# Patient Record
Sex: MALE | Race: WHITE | NOT HISPANIC OR LATINO | Employment: OTHER | URBAN - METROPOLITAN AREA
[De-identification: names, ages, dates, MRNs, and addresses within clinical notes are randomized per-mention and may not be internally consistent; named-entity substitution may affect disease eponyms.]

---

## 2022-05-13 ENCOUNTER — HOSPITAL ENCOUNTER (INPATIENT)
Facility: CLINIC | Age: 62
LOS: 1 days | Discharge: HOME | DRG: 481 | End: 2022-05-15
Attending: HOSPITALIST | Admitting: HOSPITALIST
Payer: COMMERCIAL

## 2022-05-13 DIAGNOSIS — M96.662: Primary | ICD-10-CM

## 2022-05-13 LAB
ABO/RH(D): NORMAL
ANION GAP SERPL CALCULATED.3IONS-SCNC: 10 MMOL/L (ref 5–18)
ANTIBODY SCREEN: NEGATIVE
BASOPHILS # BLD AUTO: 0.1 10E3/UL (ref 0–0.2)
BASOPHILS NFR BLD AUTO: 1 %
BUN SERPL-MCNC: 18 MG/DL (ref 8–22)
CALCIUM SERPL-MCNC: 9.2 MG/DL (ref 8.5–10.5)
CHLORIDE BLD-SCNC: 107 MMOL/L (ref 98–107)
CO2 SERPL-SCNC: 24 MMOL/L (ref 22–31)
CREAT SERPL-MCNC: 0.7 MG/DL (ref 0.7–1.3)
EOSINOPHIL # BLD AUTO: 0.5 10E3/UL (ref 0–0.7)
EOSINOPHIL NFR BLD AUTO: 5 %
ERYTHROCYTE [DISTWIDTH] IN BLOOD BY AUTOMATED COUNT: 14.6 % (ref 10–15)
GFR SERPL CREATININE-BSD FRML MDRD: >90 ML/MIN/1.73M2
GLUCOSE BLD-MCNC: 102 MG/DL (ref 70–125)
HCT VFR BLD AUTO: 49 % (ref 40–53)
HGB BLD-MCNC: 15.8 G/DL (ref 13.3–17.7)
HOLD SPECIMEN: NORMAL
IMM GRANULOCYTES # BLD: 0 10E3/UL
IMM GRANULOCYTES NFR BLD: 0 %
INR PPP: 1.2 (ref 0.85–1.15)
LYMPHOCYTES # BLD AUTO: 1.4 10E3/UL (ref 0.8–5.3)
LYMPHOCYTES NFR BLD AUTO: 14 %
MCH RBC QN AUTO: 27.9 PG (ref 26.5–33)
MCHC RBC AUTO-ENTMCNC: 32.2 G/DL (ref 31.5–36.5)
MCV RBC AUTO: 86 FL (ref 78–100)
MONOCYTES # BLD AUTO: 0.7 10E3/UL (ref 0–1.3)
MONOCYTES NFR BLD AUTO: 7 %
NEUTROPHILS # BLD AUTO: 7.2 10E3/UL (ref 1.6–8.3)
NEUTROPHILS NFR BLD AUTO: 73 %
NRBC # BLD AUTO: 0 10E3/UL
NRBC BLD AUTO-RTO: 0 /100
PLATELET # BLD AUTO: 389 10E3/UL (ref 150–450)
POTASSIUM BLD-SCNC: 4.1 MMOL/L (ref 3.5–5)
RBC # BLD AUTO: 5.67 10E6/UL (ref 4.4–5.9)
SODIUM SERPL-SCNC: 141 MMOL/L (ref 136–145)
SPECIMEN EXPIRATION DATE: NORMAL
WBC # BLD AUTO: 10 10E3/UL (ref 4–11)

## 2022-05-13 PROCEDURE — 85025 COMPLETE CBC W/AUTO DIFF WBC: CPT | Performed by: HOSPITALIST

## 2022-05-13 PROCEDURE — 86850 RBC ANTIBODY SCREEN: CPT | Performed by: HOSPITALIST

## 2022-05-13 PROCEDURE — 86901 BLOOD TYPING SEROLOGIC RH(D): CPT | Performed by: HOSPITALIST

## 2022-05-13 PROCEDURE — 86923 COMPATIBILITY TEST ELECTRIC: CPT | Performed by: ANESTHESIOLOGY

## 2022-05-13 PROCEDURE — 99222 1ST HOSP IP/OBS MODERATE 55: CPT | Performed by: HOSPITALIST

## 2022-05-13 PROCEDURE — 80048 BASIC METABOLIC PNL TOTAL CA: CPT | Performed by: HOSPITALIST

## 2022-05-13 PROCEDURE — 250N000013 HC RX MED GY IP 250 OP 250 PS 637: Performed by: HOSPITALIST

## 2022-05-13 PROCEDURE — 36415 COLL VENOUS BLD VENIPUNCTURE: CPT | Performed by: HOSPITALIST

## 2022-05-13 PROCEDURE — 85610 PROTHROMBIN TIME: CPT | Performed by: HOSPITALIST

## 2022-05-13 RX ORDER — PROCHLORPERAZINE MALEATE 10 MG
10 TABLET ORAL EVERY 6 HOURS PRN
Status: DISCONTINUED | OUTPATIENT
Start: 2022-05-13 | End: 2022-05-15 | Stop reason: HOSPADM

## 2022-05-13 RX ORDER — ONDANSETRON 4 MG/1
4 TABLET, ORALLY DISINTEGRATING ORAL EVERY 6 HOURS PRN
Status: DISCONTINUED | OUTPATIENT
Start: 2022-05-13 | End: 2022-05-15 | Stop reason: HOSPADM

## 2022-05-13 RX ORDER — DOCUSATE SODIUM 100 MG/1
100 CAPSULE, LIQUID FILLED ORAL 2 TIMES DAILY
Status: DISCONTINUED | OUTPATIENT
Start: 2022-05-13 | End: 2022-05-15 | Stop reason: HOSPADM

## 2022-05-13 RX ORDER — HYDROMORPHONE HCL IN WATER/PF 6 MG/30 ML
0.2 PATIENT CONTROLLED ANALGESIA SYRINGE INTRAVENOUS
Status: DISCONTINUED | OUTPATIENT
Start: 2022-05-13 | End: 2022-05-15 | Stop reason: HOSPADM

## 2022-05-13 RX ORDER — ACETAMINOPHEN 325 MG/1
650 TABLET ORAL EVERY 4 HOURS PRN
Status: DISCONTINUED | OUTPATIENT
Start: 2022-05-16 | End: 2022-05-15 | Stop reason: HOSPADM

## 2022-05-13 RX ORDER — CLONAZEPAM 0.5 MG/1
1 TABLET ORAL 2 TIMES DAILY
Status: DISCONTINUED | OUTPATIENT
Start: 2022-05-13 | End: 2022-05-15 | Stop reason: HOSPADM

## 2022-05-13 RX ORDER — CLONAZEPAM 1 MG/1
1 TABLET ORAL 2 TIMES DAILY
COMMUNITY
Start: 2022-04-26

## 2022-05-13 RX ORDER — NALOXONE HYDROCHLORIDE 0.4 MG/ML
0.4 INJECTION, SOLUTION INTRAMUSCULAR; INTRAVENOUS; SUBCUTANEOUS
Status: DISCONTINUED | OUTPATIENT
Start: 2022-05-13 | End: 2022-05-15 | Stop reason: HOSPADM

## 2022-05-13 RX ORDER — HYDROMORPHONE HYDROCHLORIDE 4 MG/1
4 TABLET ORAL EVERY 4 HOURS PRN
Status: DISCONTINUED | OUTPATIENT
Start: 2022-05-13 | End: 2022-05-15 | Stop reason: HOSPADM

## 2022-05-13 RX ORDER — PROCHLORPERAZINE 25 MG
25 SUPPOSITORY, RECTAL RECTAL EVERY 12 HOURS PRN
Status: DISCONTINUED | OUTPATIENT
Start: 2022-05-13 | End: 2022-05-15 | Stop reason: HOSPADM

## 2022-05-13 RX ORDER — CHOLECALCIFEROL (VITAMIN D3) 50 MCG
1 TABLET ORAL DAILY
COMMUNITY

## 2022-05-13 RX ORDER — NALOXONE HYDROCHLORIDE 0.4 MG/ML
0.2 INJECTION, SOLUTION INTRAMUSCULAR; INTRAVENOUS; SUBCUTANEOUS
Status: DISCONTINUED | OUTPATIENT
Start: 2022-05-13 | End: 2022-05-15 | Stop reason: HOSPADM

## 2022-05-13 RX ORDER — ACETAMINOPHEN 325 MG/1
975 TABLET ORAL EVERY 8 HOURS
Status: DISCONTINUED | OUTPATIENT
Start: 2022-05-13 | End: 2022-05-15 | Stop reason: HOSPADM

## 2022-05-13 RX ORDER — HYDROMORPHONE HCL IN WATER/PF 6 MG/30 ML
0.4 PATIENT CONTROLLED ANALGESIA SYRINGE INTRAVENOUS
Status: DISCONTINUED | OUTPATIENT
Start: 2022-05-13 | End: 2022-05-15 | Stop reason: HOSPADM

## 2022-05-13 RX ORDER — HYDROMORPHONE HYDROCHLORIDE 2 MG/1
2 TABLET ORAL EVERY 4 HOURS PRN
Status: DISCONTINUED | OUTPATIENT
Start: 2022-05-13 | End: 2022-05-15 | Stop reason: HOSPADM

## 2022-05-13 RX ORDER — AMOXICILLIN 500 MG/1
2000 TABLET, FILM COATED ORAL SEE ADMIN INSTRUCTIONS
COMMUNITY
Start: 2022-03-08

## 2022-05-13 RX ORDER — POLYETHYLENE GLYCOL 3350 17 G/17G
17 POWDER, FOR SOLUTION ORAL DAILY
Status: DISCONTINUED | OUTPATIENT
Start: 2022-05-13 | End: 2022-05-15 | Stop reason: HOSPADM

## 2022-05-13 RX ORDER — TAMSULOSIN HYDROCHLORIDE 0.4 MG/1
0.4 CAPSULE ORAL DAILY
COMMUNITY
Start: 2022-04-12

## 2022-05-13 RX ORDER — ONDANSETRON 2 MG/ML
4 INJECTION INTRAMUSCULAR; INTRAVENOUS EVERY 6 HOURS PRN
Status: DISCONTINUED | OUTPATIENT
Start: 2022-05-13 | End: 2022-05-15 | Stop reason: HOSPADM

## 2022-05-13 RX ORDER — TAMSULOSIN HYDROCHLORIDE 0.4 MG/1
0.4 CAPSULE ORAL DAILY
Status: DISCONTINUED | OUTPATIENT
Start: 2022-05-14 | End: 2022-05-15 | Stop reason: HOSPADM

## 2022-05-13 RX ADMIN — CLONAZEPAM 1 MG: 0.5 TABLET ORAL at 21:46

## 2022-05-13 RX ADMIN — ACETAMINOPHEN 975 MG: 325 TABLET ORAL at 18:28

## 2022-05-13 RX ADMIN — DOCUSATE SODIUM 100 MG: 100 CAPSULE, LIQUID FILLED ORAL at 21:46

## 2022-05-13 ASSESSMENT — COLUMBIA-SUICIDE SEVERITY RATING SCALE - C-SSRS
1. IN THE PAST MONTH, HAVE YOU WISHED YOU WERE DEAD OR WISHED YOU COULD GO TO SLEEP AND NOT WAKE UP?: NO
5. HAVE YOU STARTED TO WORK OUT OR WORKED OUT THE DETAILS OF HOW TO KILL YOURSELF? DO YOU INTEND TO CARRY OUT THIS PLAN?: NO
2. HAVE YOU ACTUALLY HAD ANY THOUGHTS OF KILLING YOURSELF IN THE PAST MONTH?: NO
4. HAVE YOU HAD THESE THOUGHTS AND HAD SOME INTENTION OF ACTING ON THEM?: NO
3. HAVE YOU BEEN THINKING ABOUT HOW YOU MIGHT KILL YOURSELF?: NO
6. HAVE YOU EVER DONE ANYTHING, STARTED TO DO ANYTHING, OR PREPARED TO DO ANYTHING TO END YOUR LIFE?: NO
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
## 2022-05-14 ENCOUNTER — ANESTHESIA (OUTPATIENT)
Dept: SURGERY | Facility: CLINIC | Age: 62
DRG: 481 | End: 2022-05-14
Payer: COMMERCIAL

## 2022-05-14 ENCOUNTER — ANESTHESIA EVENT (OUTPATIENT)
Dept: SURGERY | Facility: CLINIC | Age: 62
DRG: 481 | End: 2022-05-14
Payer: COMMERCIAL

## 2022-05-14 ENCOUNTER — APPOINTMENT (OUTPATIENT)
Dept: RADIOLOGY | Facility: CLINIC | Age: 62
DRG: 481 | End: 2022-05-14
Attending: STUDENT IN AN ORGANIZED HEALTH CARE EDUCATION/TRAINING PROGRAM
Payer: COMMERCIAL

## 2022-05-14 PROBLEM — M96.662: Status: ACTIVE | Noted: 2022-05-14

## 2022-05-14 LAB
BLD PROD TYP BPU: NORMAL
BLOOD COMPONENT TYPE: NORMAL
CODING SYSTEM: NORMAL
CROSSMATCH: NORMAL
SARS-COV-2 RNA RESP QL NAA+PROBE: NEGATIVE
UNIT ABO/RH: NORMAL
UNIT NUMBER: NORMAL
UNIT STATUS: NORMAL
UNIT TYPE ISBT: 6200

## 2022-05-14 PROCEDURE — 258N000001 HC RX 258: Performed by: STUDENT IN AN ORGANIZED HEALTH CARE EDUCATION/TRAINING PROGRAM

## 2022-05-14 PROCEDURE — 0QS904Z REPOSITION LEFT FEMORAL SHAFT WITH INTERNAL FIXATION DEVICE, OPEN APPROACH: ICD-10-PCS | Performed by: STUDENT IN AN ORGANIZED HEALTH CARE EDUCATION/TRAINING PROGRAM

## 2022-05-14 PROCEDURE — 272N000001 HC OR GENERAL SUPPLY STERILE: Performed by: STUDENT IN AN ORGANIZED HEALTH CARE EDUCATION/TRAINING PROGRAM

## 2022-05-14 PROCEDURE — 250N000013 HC RX MED GY IP 250 OP 250 PS 637: Performed by: PHYSICIAN ASSISTANT

## 2022-05-14 PROCEDURE — 120N000001 HC R&B MED SURG/OB

## 2022-05-14 PROCEDURE — 250N000011 HC RX IP 250 OP 636: Performed by: ORTHOPAEDIC SURGERY

## 2022-05-14 PROCEDURE — 370N000017 HC ANESTHESIA TECHNICAL FEE, PER MIN: Performed by: STUDENT IN AN ORGANIZED HEALTH CARE EDUCATION/TRAINING PROGRAM

## 2022-05-14 PROCEDURE — 250N000011 HC RX IP 250 OP 636: Performed by: ANESTHESIOLOGY

## 2022-05-14 PROCEDURE — 258N000003 HC RX IP 258 OP 636: Performed by: ANESTHESIOLOGY

## 2022-05-14 PROCEDURE — 360N000077 HC SURGERY LEVEL 4, PER MIN: Performed by: STUDENT IN AN ORGANIZED HEALTH CARE EDUCATION/TRAINING PROGRAM

## 2022-05-14 PROCEDURE — C1769 GUIDE WIRE: HCPCS | Performed by: STUDENT IN AN ORGANIZED HEALTH CARE EDUCATION/TRAINING PROGRAM

## 2022-05-14 PROCEDURE — P9041 ALBUMIN (HUMAN),5%, 50ML: HCPCS | Performed by: ANESTHESIOLOGY

## 2022-05-14 PROCEDURE — 999N000181 XR SURGERY CARM FLUORO GREATER THAN 5 MIN W STILLS: Mod: TC

## 2022-05-14 PROCEDURE — 710N000010 HC RECOVERY PHASE 1, LEVEL 2, PER MIN: Performed by: STUDENT IN AN ORGANIZED HEALTH CARE EDUCATION/TRAINING PROGRAM

## 2022-05-14 PROCEDURE — 250N000011 HC RX IP 250 OP 636: Performed by: PHYSICIAN ASSISTANT

## 2022-05-14 PROCEDURE — 73552 X-RAY EXAM OF FEMUR 2/>: CPT | Mod: LT

## 2022-05-14 PROCEDURE — 250N000011 HC RX IP 250 OP 636: Performed by: STUDENT IN AN ORGANIZED HEALTH CARE EDUCATION/TRAINING PROGRAM

## 2022-05-14 PROCEDURE — U0005 INFEC AGEN DETEC AMPLI PROBE: HCPCS | Performed by: FAMILY MEDICINE

## 2022-05-14 PROCEDURE — C1713 ANCHOR/SCREW BN/BN,TIS/BN: HCPCS | Performed by: STUDENT IN AN ORGANIZED HEALTH CARE EDUCATION/TRAINING PROGRAM

## 2022-05-14 PROCEDURE — 250N000009 HC RX 250: Performed by: STUDENT IN AN ORGANIZED HEALTH CARE EDUCATION/TRAINING PROGRAM

## 2022-05-14 PROCEDURE — 250N000011 HC RX IP 250 OP 636: Performed by: NURSE ANESTHETIST, CERTIFIED REGISTERED

## 2022-05-14 PROCEDURE — 258N000003 HC RX IP 258 OP 636: Performed by: NURSE ANESTHETIST, CERTIFIED REGISTERED

## 2022-05-14 PROCEDURE — 250N000009 HC RX 250: Performed by: NURSE ANESTHETIST, CERTIFIED REGISTERED

## 2022-05-14 PROCEDURE — 99233 SBSQ HOSP IP/OBS HIGH 50: CPT | Performed by: FAMILY MEDICINE

## 2022-05-14 PROCEDURE — 250N000013 HC RX MED GY IP 250 OP 250 PS 637: Performed by: HOSPITALIST

## 2022-05-14 DEVICE — IMPLANTABLE DEVICE: Type: IMPLANTABLE DEVICE | Site: LEG | Status: FUNCTIONAL

## 2022-05-14 DEVICE — IMP SCR SYN CORTEX 4.5X38MM SELF TAP SS 214.838: Type: IMPLANTABLE DEVICE | Site: LEG | Status: FUNCTIONAL

## 2022-05-14 DEVICE — IMP CABLE W/CRIMP SYN 1.7X750MM 298.801.01S: Type: IMPLANTABLE DEVICE | Site: LEG | Status: FUNCTIONAL

## 2022-05-14 DEVICE — IMP SCR SYN CORTEX 4.5X40MM SELF TAP SS 214.840: Type: IMPLANTABLE DEVICE | Site: LEG | Status: FUNCTIONAL

## 2022-05-14 DEVICE — IMP SCR SYN 5.0X44MM VA LOCK ST T25 STARDRIVE 02.231.244: Type: IMPLANTABLE DEVICE | Site: LEG | Status: FUNCTIONAL

## 2022-05-14 RX ORDER — FENTANYL CITRATE 50 UG/ML
25 INJECTION, SOLUTION INTRAMUSCULAR; INTRAVENOUS EVERY 5 MIN PRN
Status: DISCONTINUED | OUTPATIENT
Start: 2022-05-14 | End: 2022-05-14 | Stop reason: HOSPADM

## 2022-05-14 RX ORDER — CEFAZOLIN SODIUM 2 G/100ML
2 INJECTION, SOLUTION INTRAVENOUS SEE ADMIN INSTRUCTIONS
Status: DISCONTINUED | OUTPATIENT
Start: 2022-05-14 | End: 2022-05-14 | Stop reason: HOSPADM

## 2022-05-14 RX ORDER — PROCHLORPERAZINE MALEATE 10 MG
10 TABLET ORAL EVERY 6 HOURS PRN
Status: DISCONTINUED | OUTPATIENT
Start: 2022-05-14 | End: 2022-05-15 | Stop reason: HOSPADM

## 2022-05-14 RX ORDER — HYDROMORPHONE HCL IN WATER/PF 6 MG/30 ML
0.2 PATIENT CONTROLLED ANALGESIA SYRINGE INTRAVENOUS EVERY 5 MIN PRN
Status: DISCONTINUED | OUTPATIENT
Start: 2022-05-14 | End: 2022-05-14 | Stop reason: HOSPADM

## 2022-05-14 RX ORDER — HYDROMORPHONE HYDROCHLORIDE 2 MG/1
2 TABLET ORAL EVERY 4 HOURS PRN
Status: DISCONTINUED | OUTPATIENT
Start: 2022-05-14 | End: 2022-05-15 | Stop reason: HOSPADM

## 2022-05-14 RX ORDER — HYDROMORPHONE HCL IN WATER/PF 6 MG/30 ML
0.2 PATIENT CONTROLLED ANALGESIA SYRINGE INTRAVENOUS
Status: DISCONTINUED | OUTPATIENT
Start: 2022-05-14 | End: 2022-05-15 | Stop reason: HOSPADM

## 2022-05-14 RX ORDER — ONDANSETRON 2 MG/ML
4 INJECTION INTRAMUSCULAR; INTRAVENOUS EVERY 6 HOURS PRN
Status: DISCONTINUED | OUTPATIENT
Start: 2022-05-14 | End: 2022-05-15 | Stop reason: HOSPADM

## 2022-05-14 RX ORDER — CEFAZOLIN SODIUM 1 G/3ML
1 INJECTION, POWDER, FOR SOLUTION INTRAMUSCULAR; INTRAVENOUS EVERY 8 HOURS
Status: DISCONTINUED | OUTPATIENT
Start: 2022-05-14 | End: 2022-05-15 | Stop reason: HOSPADM

## 2022-05-14 RX ORDER — ACETAMINOPHEN 325 MG/1
650 TABLET ORAL EVERY 4 HOURS PRN
Status: DISCONTINUED | OUTPATIENT
Start: 2022-05-17 | End: 2022-05-14

## 2022-05-14 RX ORDER — ONDANSETRON 2 MG/ML
INJECTION INTRAMUSCULAR; INTRAVENOUS PRN
Status: DISCONTINUED | OUTPATIENT
Start: 2022-05-14 | End: 2022-05-14

## 2022-05-14 RX ORDER — TRANEXAMIC ACID 100 MG/ML
INJECTION, SOLUTION INTRAVENOUS
Status: DISCONTINUED
Start: 2022-05-14 | End: 2022-05-14 | Stop reason: HOSPADM

## 2022-05-14 RX ORDER — HYDROMORPHONE HCL IN WATER/PF 6 MG/30 ML
0.4 PATIENT CONTROLLED ANALGESIA SYRINGE INTRAVENOUS
Status: DISCONTINUED | OUTPATIENT
Start: 2022-05-14 | End: 2022-05-15 | Stop reason: HOSPADM

## 2022-05-14 RX ORDER — LIDOCAINE HYDROCHLORIDE 20 MG/ML
INJECTION, SOLUTION INFILTRATION; PERINEURAL PRN
Status: DISCONTINUED | OUTPATIENT
Start: 2022-05-14 | End: 2022-05-14

## 2022-05-14 RX ORDER — SODIUM CHLORIDE, SODIUM LACTATE, POTASSIUM CHLORIDE, CALCIUM CHLORIDE 600; 310; 30; 20 MG/100ML; MG/100ML; MG/100ML; MG/100ML
INJECTION, SOLUTION INTRAVENOUS CONTINUOUS
Status: DISCONTINUED | OUTPATIENT
Start: 2022-05-14 | End: 2022-05-15

## 2022-05-14 RX ORDER — VANCOMYCIN HYDROCHLORIDE 1 G/20ML
INJECTION, POWDER, LYOPHILIZED, FOR SOLUTION INTRAVENOUS PRN
Status: DISCONTINUED | OUTPATIENT
Start: 2022-05-14 | End: 2022-05-14 | Stop reason: HOSPADM

## 2022-05-14 RX ORDER — DEXAMETHASONE SODIUM PHOSPHATE 10 MG/ML
INJECTION, SOLUTION INTRAMUSCULAR; INTRAVENOUS PRN
Status: DISCONTINUED | OUTPATIENT
Start: 2022-05-14 | End: 2022-05-14

## 2022-05-14 RX ORDER — ONDANSETRON 4 MG/1
4 TABLET, ORALLY DISINTEGRATING ORAL EVERY 6 HOURS PRN
Status: DISCONTINUED | OUTPATIENT
Start: 2022-05-14 | End: 2022-05-15 | Stop reason: HOSPADM

## 2022-05-14 RX ORDER — BUPIVACAINE HYDROCHLORIDE 2.5 MG/ML
INJECTION, SOLUTION INFILTRATION; PERINEURAL PRN
Status: DISCONTINUED | OUTPATIENT
Start: 2022-05-14 | End: 2022-05-14 | Stop reason: HOSPADM

## 2022-05-14 RX ORDER — HYDROMORPHONE HYDROCHLORIDE 4 MG/1
4 TABLET ORAL EVERY 4 HOURS PRN
Status: DISCONTINUED | OUTPATIENT
Start: 2022-05-14 | End: 2022-05-15 | Stop reason: HOSPADM

## 2022-05-14 RX ORDER — ONDANSETRON 2 MG/ML
4 INJECTION INTRAMUSCULAR; INTRAVENOUS EVERY 30 MIN PRN
Status: DISCONTINUED | OUTPATIENT
Start: 2022-05-14 | End: 2022-05-14 | Stop reason: HOSPADM

## 2022-05-14 RX ORDER — CEFAZOLIN SODIUM 2 G/100ML
2 INJECTION, SOLUTION INTRAVENOUS
Status: COMPLETED | OUTPATIENT
Start: 2022-05-14 | End: 2022-05-14

## 2022-05-14 RX ORDER — LIDOCAINE 40 MG/G
CREAM TOPICAL
Status: DISCONTINUED | OUTPATIENT
Start: 2022-05-14 | End: 2022-05-15 | Stop reason: HOSPADM

## 2022-05-14 RX ORDER — ONDANSETRON 4 MG/1
4 TABLET, ORALLY DISINTEGRATING ORAL EVERY 30 MIN PRN
Status: DISCONTINUED | OUTPATIENT
Start: 2022-05-14 | End: 2022-05-14 | Stop reason: HOSPADM

## 2022-05-14 RX ORDER — MAGNESIUM HYDROXIDE 1200 MG/15ML
LIQUID ORAL PRN
Status: DISCONTINUED | OUTPATIENT
Start: 2022-05-14 | End: 2022-05-14 | Stop reason: HOSPADM

## 2022-05-14 RX ORDER — SODIUM CHLORIDE, SODIUM LACTATE, POTASSIUM CHLORIDE, CALCIUM CHLORIDE 600; 310; 30; 20 MG/100ML; MG/100ML; MG/100ML; MG/100ML
INJECTION, SOLUTION INTRAVENOUS CONTINUOUS
Status: DISCONTINUED | OUTPATIENT
Start: 2022-05-14 | End: 2022-05-14 | Stop reason: HOSPADM

## 2022-05-14 RX ORDER — BISACODYL 10 MG
10 SUPPOSITORY, RECTAL RECTAL DAILY PRN
Status: DISCONTINUED | OUTPATIENT
Start: 2022-05-14 | End: 2022-05-15 | Stop reason: HOSPADM

## 2022-05-14 RX ORDER — POLYETHYLENE GLYCOL 3350 17 G/17G
17 POWDER, FOR SOLUTION ORAL DAILY
Status: DISCONTINUED | OUTPATIENT
Start: 2022-05-15 | End: 2022-05-15 | Stop reason: HOSPADM

## 2022-05-14 RX ORDER — ALBUMIN, HUMAN INJ 5% 5 %
SOLUTION INTRAVENOUS CONTINUOUS PRN
Status: DISCONTINUED | OUTPATIENT
Start: 2022-05-14 | End: 2022-05-14

## 2022-05-14 RX ORDER — BUPIVACAINE HYDROCHLORIDE 7.5 MG/ML
INJECTION, SOLUTION INTRASPINAL
Status: COMPLETED | OUTPATIENT
Start: 2022-05-14 | End: 2022-05-14

## 2022-05-14 RX ORDER — TRANEXAMIC ACID 650 MG/1
1950 TABLET ORAL ONCE
Status: DISCONTINUED | OUTPATIENT
Start: 2022-05-14 | End: 2022-05-14

## 2022-05-14 RX ORDER — ACETAMINOPHEN 325 MG/1
975 TABLET ORAL EVERY 8 HOURS
Status: DISCONTINUED | OUTPATIENT
Start: 2022-05-14 | End: 2022-05-14

## 2022-05-14 RX ORDER — FENTANYL CITRATE 50 UG/ML
INJECTION, SOLUTION INTRAMUSCULAR; INTRAVENOUS PRN
Status: DISCONTINUED | OUTPATIENT
Start: 2022-05-14 | End: 2022-05-14

## 2022-05-14 RX ORDER — PROPOFOL 10 MG/ML
INJECTION, EMULSION INTRAVENOUS CONTINUOUS PRN
Status: DISCONTINUED | OUTPATIENT
Start: 2022-05-14 | End: 2022-05-14

## 2022-05-14 RX ORDER — AMOXICILLIN 250 MG
1 CAPSULE ORAL 2 TIMES DAILY
Status: DISCONTINUED | OUTPATIENT
Start: 2022-05-14 | End: 2022-05-15 | Stop reason: HOSPADM

## 2022-05-14 RX ORDER — SODIUM CHLORIDE, SODIUM LACTATE, POTASSIUM CHLORIDE, CALCIUM CHLORIDE 600; 310; 30; 20 MG/100ML; MG/100ML; MG/100ML; MG/100ML
INJECTION, SOLUTION INTRAVENOUS CONTINUOUS PRN
Status: DISCONTINUED | OUTPATIENT
Start: 2022-05-14 | End: 2022-05-14

## 2022-05-14 RX ORDER — PROPOFOL 10 MG/ML
INJECTION, EMULSION INTRAVENOUS PRN
Status: DISCONTINUED | OUTPATIENT
Start: 2022-05-14 | End: 2022-05-14

## 2022-05-14 RX ADMIN — HYDROMORPHONE HYDROCHLORIDE 2 MG: 2 TABLET ORAL at 18:38

## 2022-05-14 RX ADMIN — SODIUM CHLORIDE, POTASSIUM CHLORIDE, SODIUM LACTATE AND CALCIUM CHLORIDE: 600; 310; 30; 20 INJECTION, SOLUTION INTRAVENOUS at 12:32

## 2022-05-14 RX ADMIN — HYDROMORPHONE HYDROCHLORIDE 2 MG: 2 TABLET ORAL at 21:10

## 2022-05-14 RX ADMIN — CLONAZEPAM 1 MG: 0.5 TABLET ORAL at 11:45

## 2022-05-14 RX ADMIN — DEXAMETHASONE SODIUM PHOSPHATE 10 MG: 10 INJECTION, SOLUTION INTRAMUSCULAR; INTRAVENOUS at 13:11

## 2022-05-14 RX ADMIN — ONDANSETRON 4 MG: 2 INJECTION INTRAMUSCULAR; INTRAVENOUS at 13:11

## 2022-05-14 RX ADMIN — SENNOSIDES AND DOCUSATE SODIUM 1 TABLET: 50; 8.6 TABLET ORAL at 21:10

## 2022-05-14 RX ADMIN — FENTANYL CITRATE 25 MCG: 50 INJECTION, SOLUTION INTRAMUSCULAR; INTRAVENOUS at 14:25

## 2022-05-14 RX ADMIN — HYDROMORPHONE HYDROCHLORIDE 0.2 MG: 0.2 INJECTION, SOLUTION INTRAMUSCULAR; INTRAVENOUS; SUBCUTANEOUS at 16:40

## 2022-05-14 RX ADMIN — HYDROMORPHONE HYDROCHLORIDE 0.2 MG: 0.2 INJECTION, SOLUTION INTRAMUSCULAR; INTRAVENOUS; SUBCUTANEOUS at 16:56

## 2022-05-14 RX ADMIN — HYDROMORPHONE HYDROCHLORIDE 0.2 MG: 0.2 INJECTION, SOLUTION INTRAMUSCULAR; INTRAVENOUS; SUBCUTANEOUS at 16:51

## 2022-05-14 RX ADMIN — CEFAZOLIN SODIUM 2 G: 2 INJECTION, SOLUTION INTRAVENOUS at 12:33

## 2022-05-14 RX ADMIN — HYDROMORPHONE HYDROCHLORIDE 0.5 MG: 1 INJECTION, SOLUTION INTRAMUSCULAR; INTRAVENOUS; SUBCUTANEOUS at 15:40

## 2022-05-14 RX ADMIN — FENTANYL CITRATE 25 MCG: 50 INJECTION, SOLUTION INTRAMUSCULAR; INTRAVENOUS at 16:29

## 2022-05-14 RX ADMIN — FENTANYL CITRATE 25 MCG: 50 INJECTION, SOLUTION INTRAMUSCULAR; INTRAVENOUS at 14:43

## 2022-05-14 RX ADMIN — PHENYLEPHRINE HYDROCHLORIDE 100 MCG: 10 INJECTION INTRAVENOUS at 13:45

## 2022-05-14 RX ADMIN — SODIUM CHLORIDE, POTASSIUM CHLORIDE, SODIUM LACTATE AND CALCIUM CHLORIDE: 600; 310; 30; 20 INJECTION, SOLUTION INTRAVENOUS at 16:09

## 2022-05-14 RX ADMIN — CLONAZEPAM 1 MG: 0.5 TABLET ORAL at 21:09

## 2022-05-14 RX ADMIN — BUPIVACAINE HYDROCHLORIDE IN DEXTROSE 1.8 ML: 7.5 INJECTION, SOLUTION SUBARACHNOID at 12:38

## 2022-05-14 RX ADMIN — PROPOFOL 40 MG: 10 INJECTION, EMULSION INTRAVENOUS at 12:42

## 2022-05-14 RX ADMIN — ACETAMINOPHEN 975 MG: 325 TABLET ORAL at 02:02

## 2022-05-14 RX ADMIN — DOCUSATE SODIUM 100 MG: 100 CAPSULE, LIQUID FILLED ORAL at 21:10

## 2022-05-14 RX ADMIN — ACETAMINOPHEN 975 MG: 325 TABLET ORAL at 17:30

## 2022-05-14 RX ADMIN — FENTANYL CITRATE 25 MCG: 50 INJECTION, SOLUTION INTRAMUSCULAR; INTRAVENOUS at 14:20

## 2022-05-14 RX ADMIN — PROPOFOL 100 MCG/KG/MIN: 10 INJECTION, EMULSION INTRAVENOUS at 12:42

## 2022-05-14 RX ADMIN — HYDROMORPHONE HYDROCHLORIDE 0.2 MG: 0.2 INJECTION, SOLUTION INTRAMUSCULAR; INTRAVENOUS; SUBCUTANEOUS at 16:46

## 2022-05-14 RX ADMIN — ALBUMIN (HUMAN): 12.5 SOLUTION INTRAVENOUS at 14:43

## 2022-05-14 RX ADMIN — HYDROMORPHONE HYDROCHLORIDE 0.2 MG: 0.2 INJECTION, SOLUTION INTRAMUSCULAR; INTRAVENOUS; SUBCUTANEOUS at 16:35

## 2022-05-14 RX ADMIN — TRANEXAMIC ACID 1 G: 100 INJECTION, SOLUTION INTRAVENOUS at 12:58

## 2022-05-14 RX ADMIN — CEFAZOLIN 1 G: 1 INJECTION, POWDER, FOR SOLUTION INTRAMUSCULAR; INTRAVENOUS at 18:00

## 2022-05-14 RX ADMIN — HYDROMORPHONE HYDROCHLORIDE 0.4 MG: 0.2 INJECTION, SOLUTION INTRAMUSCULAR; INTRAVENOUS; SUBCUTANEOUS at 23:18

## 2022-05-14 RX ADMIN — SODIUM CHLORIDE, POTASSIUM CHLORIDE, SODIUM LACTATE AND CALCIUM CHLORIDE: 600; 310; 30; 20 INJECTION, SOLUTION INTRAVENOUS at 13:28

## 2022-05-14 RX ADMIN — FENTANYL CITRATE 25 MCG: 50 INJECTION, SOLUTION INTRAMUSCULAR; INTRAVENOUS at 16:22

## 2022-05-14 RX ADMIN — FENTANYL CITRATE 25 MCG: 50 INJECTION, SOLUTION INTRAMUSCULAR; INTRAVENOUS at 14:54

## 2022-05-14 RX ADMIN — HYDROMORPHONE HYDROCHLORIDE 0.4 MG: 0.2 INJECTION, SOLUTION INTRAMUSCULAR; INTRAVENOUS; SUBCUTANEOUS at 17:30

## 2022-05-14 RX ADMIN — HYDROMORPHONE HYDROCHLORIDE 0.5 MG: 1 INJECTION, SOLUTION INTRAMUSCULAR; INTRAVENOUS; SUBCUTANEOUS at 15:27

## 2022-05-14 RX ADMIN — LIDOCAINE HYDROCHLORIDE 80 MG: 20 INJECTION, SOLUTION INFILTRATION; PERINEURAL at 12:42

## 2022-05-14 RX ADMIN — MIDAZOLAM 2 MG: 1 INJECTION INTRAMUSCULAR; INTRAVENOUS at 12:33

## 2022-05-14 ASSESSMENT — ACTIVITIES OF DAILY LIVING (ADL)
CONCENTRATING,_REMEMBERING_OR_MAKING_DECISIONS_DIFFICULTY: NO
DOING_ERRANDS_INDEPENDENTLY_DIFFICULTY: NO
CHANGE_IN_FUNCTIONAL_STATUS_SINCE_ONSET_OF_CURRENT_ILLNESS/INJURY: YES
WALKING_OR_CLIMBING_STAIRS_DIFFICULTY: NO
DIFFICULTY_EATING/SWALLOWING: NO
WEAR_GLASSES_OR_BLIND: YES
ADLS_ACUITY_SCORE: 33
DRESSING/BATHING_DIFFICULTY: NO
FALL_HISTORY_WITHIN_LAST_SIX_MONTHS: YES
ADLS_ACUITY_SCORE: 33
ADLS_ACUITY_SCORE: 33
VISION_MANAGEMENT: GLASSES
TOILETING_ISSUES: NO
NUMBER_OF_TIMES_PATIENT_HAS_FALLEN_WITHIN_LAST_SIX_MONTHS: 1
ADLS_ACUITY_SCORE: 33
DIFFICULTY_COMMUNICATING: OTHER (SEE COMMENTS)

## 2022-05-14 NOTE — ANESTHESIA CARE TRANSFER NOTE
Patient: Derik Quiros    Procedure: Procedure(s):  OPEN REDUCTION INTERNAL FIXATION OF LEFT PREIPROSTHETIC FEMUR FRACTURE       Diagnosis: Fracture of femur following insertion of orthopedic implant, joint prosthesis, or bone plate, left leg (H) [M96.662]  Diagnosis Additional Information: No value filed.    Anesthesia Type:   Spinal     Note:    Oropharynx: oropharynx clear of all foreign objects and spontaneously breathing  Level of Consciousness: awake  Oxygen Supplementation: face mask  Level of Supplemental Oxygen (L/min / FiO2): 6  Independent Airway: airway patency satisfactory and stable  Dentition: dentition unchanged  Vital Signs Stable: post-procedure vital signs reviewed and stable  Report to RN Given: handoff report given  Patient transferred to: PACU    Handoff Report: Identifed the Patient, Identified the Reponsible Provider, Reviewed the pertinent medical history, Discussed the surgical course, Reviewed Intra-OP anesthesia mangement and issues during anesthesia, Set expectations for post-procedure period and Allowed opportunity for questions and acknowledgement of understanding      Vitals:  Vitals Value Taken Time   /64 05/14/22 1612   Temp 37.1  C (98.7  F) 05/14/22 1610   Pulse 82 05/14/22 1613   Resp 13 05/14/22 1613   SpO2 100 % 05/14/22 1613   Vitals shown include unvalidated device data.    Electronically Signed By: MATEO Barnes CRNA  May 14, 2022  4:15 PM

## 2022-05-14 NOTE — ANESTHESIA PREPROCEDURE EVALUATION
Anesthesia Pre-Procedure Evaluation    Patient: Derik Quiros   MRN: [id number] : 1960        Procedure : Procedure(s):  OPEN REDUCTION INTERNAL FIXATION OF LEFT PREIPROSTHETIC FEMUR FRACTURE          No past medical history on file.   No past surgical history on file.   Allergies   Allergen Reactions     Aspirin Hives and Swelling     Edema.   Has taken ibuprofen in the past and tolerated.     Contrast Dye Hives     Hydrocodone Hives     Welts  Norco (hydrocodone-acetaminophen), prescribed 2022 as alternative to oxycodone after developing similar reaction.   Has tolerated acetaminophen in the past.      Iodinated Diagnostic Agents [Diagnostic X-Ray Materials] Hives     Oxycodone Hives     Welts  Prescribed 2022.     Phenobarbital Hives     Codeine Rash      Social History     Tobacco Use     Smoking status: Not on file     Smokeless tobacco: Not on file   Substance Use Topics     Alcohol use: Not on file      Wt Readings from Last 1 Encounters:   No data found for Wt        Anesthesia Evaluation            ROS/MED HX  ENT/Pulmonary:  - neg pulmonary ROS     Neurologic:  - neg neurologic ROS     Cardiovascular:  - neg cardiovascular ROS     METS/Exercise Tolerance:     Hematologic:  - neg hematologic  ROS     Musculoskeletal:  - neg musculoskeletal ROS     GI/Hepatic:  - neg GI/hepatic ROS     Renal/Genitourinary:  - neg Renal ROS     Endo:  - neg endo ROS     Psychiatric/Substance Use:  - neg psychiatric ROS     Infectious Disease:  - neg infectious disease ROS     Malignancy:  - neg malignancy ROS     Other:  - neg other ROS          Physical Exam    Airway        Mallampati: II    Neck ROM: full     Respiratory Devices and Support         Dental           Cardiovascular   cardiovascular exam normal          Pulmonary   pulmonary exam normal                OUTSIDE LABS:  CBC:   Lab Results   Component Value Date    WBC 10.0 2022    HGB 15.8 2022    HCT 49.0 2022      05/13/2022     BMP:   Lab Results   Component Value Date     05/13/2022    POTASSIUM 4.1 05/13/2022    CHLORIDE 107 05/13/2022    CO2 24 05/13/2022    BUN 18 05/13/2022    CR 0.70 05/13/2022     05/13/2022     COAGS:   Lab Results   Component Value Date    INR 1.20 (H) 05/13/2022     POC: No results found for: BGM, HCG, HCGS  HEPATIC: No results found for: ALBUMIN, PROTTOTAL, ALT, AST, GGT, ALKPHOS, BILITOTAL, BILIDIRECT, DONNA  OTHER:   Lab Results   Component Value Date    URSULA 9.2 05/13/2022       Anesthesia Plan    ASA Status:  2      Anesthesia Type: Spinal.              Consents    Anesthesia Plan(s) and associated risks, benefits, and realistic alternatives discussed. Questions answered and patient/representative(s) expressed understanding.    - Discussed:     - Discussed with:  Patient         Postoperative Care            Comments:                Trae Goodman MD

## 2022-05-14 NOTE — PLAN OF CARE
Problem: Plan of Care - These are the overarching goals to be used throughout the patient stay.    Goal: Optimal Comfort and Wellbeing  Outcome: Ongoing, Progressing  Intervention: Monitor Pain and Promote Comfort  Recent Flowsheet Documentation  Taken 5/14/2022 0756 by George Dc, RN  Pain Management Interventions: declines     Problem: Adjustment to Injury (Hip Fracture Medical Management)  Goal: Optimal Coping with Change in Health Status  Outcome: Ongoing, Progressing     Problem: Fracture Stabilization and Management (Hip Fracture Medical Management)  Goal: Fracture Stability  Outcome: Ongoing, Progressing   Goal Outcome Evaluation:    Plan of Care Reviewed With: patient, spouse     Overall Patient Progress: improving     AVSS on RA. Pain minimal and no interventions needed. Neuros intact. NPO since midnight for ORIF. Adalberto bath and nasal swabs completed. COVID swab sent and negative. OR report given at 1200. Pt transferred at 1210. Will await pts return.
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
## 2022-05-14 NOTE — ANESTHESIA PROCEDURE NOTES
Intrathecal injection Procedure Note    Pre-Procedure   Staff -        Anesthesiologist:  Trae Goodman MD       Performed By: anesthesiologist       Location: OR       Procedure Start/Stop Times: 5/14/2022 12:33 PM and 5/14/2022 12:38 PM       Pre-Anesthestic Checklist: patient identified, IV checked, risks and benefits discussed, informed consent, monitors and equipment checked, pre-op evaluation, at physician/surgeon's request and post-op pain management  Timeout:       Correct Patient: Yes        Correct Procedure: Yes        Correct Site: Yes        Correct Position: Yes   Procedure Documentation  Procedure: intrathecal injection       Patient Position: sitting       Patient Prep/Sterile Barriers: sterile gloves, mask, patient draped       Skin prep: Chloraprep       Insertion Site: L3-4. (midline approach).       Needle Gauge: 25.        Needle Length (Inches): 4        Spinal Needle Type: Pencan       Introducer used       # of attempts: 1 and  # of redirects:     Assessment/Narrative         Paresthesias: No.       CSF fluid: clear.    Medication(s) Administered   0.75% Hyperbaric Bupivacaine (Intrathecal) - Intrathecal   1.8 mL - 5/14/2022 12:38:00 PM  Medication Administration Time: 5/14/2022 12:33 PM
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
## 2022-05-14 NOTE — PLAN OF CARE
Problem: Adjustment to Injury (Hip Fracture Medical Management)  Goal: Optimal Coping with Change in Health Status  Outcome: Ongoing, Progressing     Problem: Pain (Hip Fracture Medical Management)  Goal: Acceptable Pain Level  Outcome: Ongoing, Progressing  Intervention: Manage Acute Orthopaedic-Related Pain  Recent Flowsheet Documentation  Taken 5/14/2022 0202 by Christina Posey, RN  Pain Management Interventions:    medication (see MAR)    cold applied     Problem: Plan of Care - These are the overarching goals to be used throughout the patient stay.    Goal: Optimal Comfort and Wellbeing     Problem: Functional Ability Impaired (Hip Fracture Medical Management)  Goal: Optimal Functional Performance  Intervention: Promote Optimal Functional Status  Recent Flowsheet Documentation  Taken 5/14/2022 0015 by Christina Posey, RN  Activity Management: bedrest   Goal Outcome Evaluation: Ongoing     Patient is doing well tonight, NPO since midnight for surgery today, vss, LS clear, BS active, voiding. CMS intact, pain with movement is helped by Tylenol with sip of water. Ice to hip intermittently. Denies nausea/shortness of breath. IV placed SL. X-ray of hip this morning at 8.
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
## 2022-05-14 NOTE — H&P
I personally viewed the H&P from 5/13/2022 by Demetrius Wang.  I agree with the findings.  There are no significant changes in the patient's condition or medical history.  Risk, benefits, alternatives were reviewed.  Written informed consent was obtained.  We will proceed with left periprosthetic femur ORIF.

## 2022-05-14 NOTE — PROGRESS NOTES
Pharmacy Note - Admission Medication History    Pertinent Provider Information: none     ______________________________________________________________________    Prior To Admission (PTA) med list completed and updated in EMR.       PTA Med List   Medication Sig Note Last Dose     amoxicillin (AMOXIL) 500 MG tablet Take 2,000 mg by mouth See Admin Instructions 4 tablets take 1 hour prior to dental appointment  Unknown at Unknown time     clonazePAM (KLONOPIN) 1 MG tablet Take 1 mg by mouth 2 times daily  5/13/2022 at am     tamsulosin (FLOMAX) 0.4 MG capsule Take 0.4 mg by mouth daily  5/13/2022 at Unknown time     vitamin D3 (CHOLECALCIFEROL) 50 mcg (2000 units) tablet Take 1 tablet by mouth daily 5/13/2022: Patient will resume at discharge.  5/13/2022 at please skip inpatient       Information source(s): [facility], [facility]  Method of interview communication: phone    Summary of Changes to PTA Med List  New: Flomax, clonazepam, vitamin D 50 mcg (2000 units)  Discontinued: Post-op orders (Xarelto, oxycodone, Norco, hydroxyzine, zofran, mupirocin 2%)  Changed: none    Patient was asked about OTC/herbal products specifically.  PTA med list reflects this.    In the past week, patient estimated taking medication this percent of the time:  greater than 90%.    Allergies were reviewed, assessed, and updated with the patient.      Patient does not use any multi-dose medications prior to admission.    The information provided in this note is only as accurate as the sources available at the time of the update(s).    Thank you for the opportunity to participate in the care of this patient.    Tressa Michaud RPH  5/13/2022 7:02 PM

## 2022-05-14 NOTE — ANESTHESIA POSTPROCEDURE EVALUATION
Patient: Derik Quiros    Procedure: Procedure(s):  OPEN REDUCTION INTERNAL FIXATION OF LEFT PREIPROSTHETIC FEMUR FRACTURE       Anesthesia Type:  Spinal    Note:  Disposition: Inpatient   Postop Pain Control: Uneventful            Sign Out: Well controlled pain   PONV: No   Neuro/Psych: Uneventful            Sign Out: Acceptable/Baseline neuro status   Airway/Respiratory: Uneventful            Sign Out: Acceptable/Baseline resp. status   CV/Hemodynamics: Uneventful            Sign Out: Acceptable CV status; No obvious hypovolemia; No obvious fluid overload   Other NRE: NONE   DID A NON-ROUTINE EVENT OCCUR? No           Last vitals:  Vitals Value Taken Time   /78 05/14/22 1710   Temp 37.1  C (98.7  F) 05/14/22 1610   Pulse 78 05/14/22 1710   Resp 18 05/14/22 1710   SpO2 97 % 05/14/22 1710       Electronically Signed By: Trae Goodman MD  May 14, 2022  5:29 PM

## 2022-05-14 NOTE — BRIEF OP NOTE
[facility]    Brief Operative Note    Pre-operative diagnosis: Fracture of femur following insertion of orthopedic implant, joint prosthesis, or bone plate, left leg (H) [R56.175]  Post-operative diagnosis Same as pre-operative diagnosis    Procedure: Procedure(s):  OPEN REDUCTION INTERNAL FIXATION OF LEFT PREIPROSTHETIC FEMUR FRACTURE  Surgeon: Surgeon(s) and Role:     * Srinivasan Soto MD - Primary     * Michelle Woodall PA-C - Assisting  Anesthesia: Choice   Estimated Blood Loss: 500 mL from 5/14/2022 12:26 PM to 5/14/2022  4:09 PM      Drains: None  Specimens: * No specimens in log *  Findings:   nondisplaced fracture.  Complications: None.  Implants:   Implant Name Type Inv. Item Serial No.  Lot No. LRB No. Used Action   IMP CABLE W/CRIMP SYN [id number] 298.801.01S - [id number] Metallic Hardware/[place] IMP CABLE W/CRIMP SYN [id number] 298.801.01S  [employer] [place] [id number] Left 1 Implanted   IMP CABLE W/CRIMP SYN 1.0L478XP 298.801.01S - [id number] Metallic Hardware/[place] IMP CABLE W/CRIMP SYN [id number] 298.801.01S  Imag[employer] [id number] Left 1 Implanted   IMP SCR SYN LOCKING 3.5X48MM W/T15 STARDRIVE 02.271.148 - [id number] Metallic Hardware/[place] IMP SCR SYN LOCKING 3.5X48MM W/T15 STARDRIVE 02.271.148  SYNTHES-STRATEC NA Left 1 Implanted   SCREW STARDRIVE W/T15 3.5X50MM - [id number] Metallic Hardware/[place] SCREW STARDRIVE W/T15 3.5X50MM  SYNTHES-STRATEC NA Left 1 Implanted   IMP SCR SYN 5.0X40MM VA LOCK ST T25 STARDRIVE 02.231.240 - [id number] Metallic Hardware/[place] IMP SCR SYN 5.0X40MM VA LOCK ST T25 STARDRIVE 02.231.240  SYNTHES-STRATEC NA Left 1 Wasted   IMP SCR SYN 5.0X44MM VA LOCK ST T25 STARDRIVE 02.231.244 - [id number] Metallic Hardware/[place] IMP SCR SYN 5.0X44MM VA LOCK ST T25 STARDRIVE 02.231.244  SYNTHES-STRATEC NA Left 1 Implanted   IMP SCR SYN CORTEX 4.5X36MM SELF TAP .836 - [id number] Metallic Hardware/[place] IMP SCR SYN CORTEX 4.5X36MM SELF TAP  .836  SYNTHES-STRATEC NA Left 1 Wasted   IMP SCR SYN CORTEX 4.5X38MM SELF TAP .838 - [id number] Metallic Hardware/[place] IMP SCR SYN CORTEX 4.5X38MM SELF TAP .838  SYNTHES-STRATEC NA Left 2 Implanted   IMP SCR SYN CORTEX 4.5X40MM SELF TAP .840 - [id number] Metallic Hardware/[place] IMP SCR SYN CORTEX 4.5X40MM SELF TAP .840  SYNTHES-STRATEC NA Left 2 Implanted   3.5/4.5mm VA-LCP PPFx Proximal Femur Plates, LEFT    SYNTHES NA Left 1 Implanted   3.5 Variable Angle Locking Screws, StarDrive 15    SYNTHES NA Left 1 Implanted   3.5 Variable Angle Locking Screws, StarDrive 15    SYNTHES NA Left 1 Implanted   SCREW STARDRIVE W/T15 3.5X50MM - [id number] Metallic Hardware/[place] SCREW STARDRIVE W/T15 3.5X50MM  SYNTHES-STRATEC NA Left 1 Wasted       TTWB with walker  24 hours post op ancef  Pain control - will try oral dilaudid due to allergy  DVT: Xarelto to start tomorrow, SCDs, early mobilization  PT/OT  Keep dressing in place, may remove ACE wrap POD3  Patient will follow up with me or Dr. Oliva for post op management    FROILAN TORRES MD

## 2022-05-14 NOTE — PROGRESS NOTES
[facility] MEDICINE PROGRESS NOTE      Identification/Summary: Derik Quiros is a 61 year old male with a past medical history of previous total hip arthroplasty presented after a fall with hip pain and found to have a periprosthetic fracture.  Plan for operative repair 5/14.    Assessment and Plan:  Periprosthetic hip fracture  History of PFO  Remote history of CVA  Hypertension  VTE prophylaxis    Patient is medically optimized for surgery today.  Has no significant deficits from CVA per report.  Has some isolated hypertension today which is likely related to stress and pain.  Not chronically on medication.  Monitor.  We will continue to follow post procedure.             # Coagulation Defect: INR = 1.20 (Ref range: 0.85 - 1.15) and/or PTT = N/A on admission, will monitor for bleeding        Diet: NPO per Anesthesia Guidelines for Procedure/Surgery Except for: Meds  DVT Prophylaxis: No history.  Defer to orthopedics  Code Status: Full Code    Anticipated possible discharge in 1 day  Disposition Plan   Expected Discharge: 05/15/2022     Anticipated discharge location:  Awaiting care coordination huddle  Delays:  The patient's care was discussed with the Bedside Nurse and Patient.    Anton Wen MD  [facility] Medicine  [facility]  Phone: #[phone]    Interval History/Subjective:  Patient is doing okay.  Plan for operative repair today.  He feels ready.  Pain is controlled if he sits still.  No other issues currently.    Physical Exam/Objective:  Temp:  [97.4  F (36.3  C)-98.1  F (36.7  C)] 98.1  F (36.7  C)  Pulse:  [54-66] 66  Resp:  [18] 18  BP: (142-181)/(74-87) 144/83  SpO2:  [95 %-96 %] 96 %  There is no height or weight on file to calculate BMI.    GENERAL:  Alert, appears comfortable, in no acute distress, appears stated age   HEAD:  Normocephalic, without obvious abnormality, atraumatic   EYES:  PERRL, conjunctiva/corneas  clear, no scleral icterus, EOM's intact   NOSE: Nares normal, septum midline, mucosa normal, no drainage   BACK:   Symmetric, no curvature, ROM normal   LUNGS:   Clear to auscultation bilaterally, no rales, rhonchi, or wheezing, symmetric chest rise on inhalation, respirations unlabored   CHEST WALL:  No tenderness or deformity   HEART:  Regular rate and rhythm, S1 and S2 normal, no murmur, rub, or gallop    ABDOMEN:   Soft, non-tender, bowel sounds active all four quadrants, no masses, no organomegaly, no rebound or guarding   EXTREMITIES: Extremities normal, atraumatic, no cyanosis or edema    SKIN: Dry to touch, no exanthems in the visualized areas   NEURO: Alert, oriented x3, moves all four extremities freely   PSYCH: Cooperative, behavior is appropriate      Data reviewed today: I personally reviewed all new medications, labs, imaging/diagnostics reports over the past 24 hours. Pertinent findings include:    Imaging:   No results found for this or any previous visit (from the past 24 hour(s)).    Labs:  Most Recent 3 CBC's:Recent Labs   Lab Test 05/13/22  1732   WBC 10.0   HGB 15.8   MCV 86        Most Recent 3 BMP's:Recent Labs   Lab Test 05/13/22  1732      POTASSIUM 4.1   CHLORIDE 107   CO2 24   BUN 18   CR 0.70   ANIONGAP 10   URSULA 9.2        Most Recent 2 LFT's:No lab results found.  Most Recent 3 INR's:Recent Labs   Lab Test 05/13/22  1732   INR 1.20*       Medications:   Personally Reviewed.  Medications       acetaminophen  975 mg Oral Q8H     ceFAZolin  2 g Intravenous Pre-Op/Pre-procedure x 1 dose     ceFAZolin  2 g Intravenous See Admin Instructions     clonazePAM  1 mg Oral BID     docusate sodium  100 mg Oral BID     polyethylene glycol  17 g Oral Daily     tamsulosin  0.4 mg Oral Daily     tranexamic acid  1,950 mg Oral Once

## 2022-05-15 ENCOUNTER — APPOINTMENT (OUTPATIENT)
Dept: OCCUPATIONAL THERAPY | Facility: CLINIC | Age: 62
DRG: 481 | End: 2022-05-15
Attending: HOSPITALIST
Payer: COMMERCIAL

## 2022-05-15 ENCOUNTER — APPOINTMENT (OUTPATIENT)
Dept: PHYSICAL THERAPY | Facility: CLINIC | Age: 62
DRG: 481 | End: 2022-05-15
Attending: HOSPITALIST
Payer: COMMERCIAL

## 2022-05-15 VITALS
HEART RATE: 74 BPM | OXYGEN SATURATION: 97 % | TEMPERATURE: 98.2 F | DIASTOLIC BLOOD PRESSURE: 71 MMHG | RESPIRATION RATE: 16 BRPM | SYSTOLIC BLOOD PRESSURE: 122 MMHG

## 2022-05-15 LAB
GLUCOSE BLD-MCNC: 139 MG/DL (ref 70–125)
HGB BLD-MCNC: 12.9 G/DL (ref 13.3–17.7)

## 2022-05-15 PROCEDURE — 250N000013 HC RX MED GY IP 250 OP 250 PS 637: Performed by: PHYSICIAN ASSISTANT

## 2022-05-15 PROCEDURE — 99239 HOSP IP/OBS DSCHRG MGMT >30: CPT | Performed by: FAMILY MEDICINE

## 2022-05-15 PROCEDURE — 36415 COLL VENOUS BLD VENIPUNCTURE: CPT | Performed by: FAMILY MEDICINE

## 2022-05-15 PROCEDURE — 250N000011 HC RX IP 250 OP 636: Performed by: PHYSICIAN ASSISTANT

## 2022-05-15 PROCEDURE — 85018 HEMOGLOBIN: CPT | Performed by: PHYSICIAN ASSISTANT

## 2022-05-15 PROCEDURE — 97162 PT EVAL MOD COMPLEX 30 MIN: CPT | Mod: GP

## 2022-05-15 PROCEDURE — 97166 OT EVAL MOD COMPLEX 45 MIN: CPT | Mod: GO

## 2022-05-15 PROCEDURE — 250N000013 HC RX MED GY IP 250 OP 250 PS 637: Performed by: HOSPITALIST

## 2022-05-15 PROCEDURE — 82947 ASSAY GLUCOSE BLOOD QUANT: CPT | Performed by: FAMILY MEDICINE

## 2022-05-15 PROCEDURE — 97116 GAIT TRAINING THERAPY: CPT | Mod: GP

## 2022-05-15 PROCEDURE — 97535 SELF CARE MNGMENT TRAINING: CPT | Mod: GO

## 2022-05-15 PROCEDURE — 97110 THERAPEUTIC EXERCISES: CPT | Mod: GP

## 2022-05-15 RX ORDER — ACETAMINOPHEN 325 MG/1
650 TABLET ORAL EVERY 4 HOURS PRN
Qty: 100 TABLET | Refills: 0 | Status: SHIPPED | OUTPATIENT
Start: 2022-05-15

## 2022-05-15 RX ORDER — HYDROXYZINE HYDROCHLORIDE 25 MG/1
25 TABLET, FILM COATED ORAL EVERY 6 HOURS PRN
Qty: 30 TABLET | Refills: 0 | Status: SHIPPED | OUTPATIENT
Start: 2022-05-15

## 2022-05-15 RX ORDER — HYDROMORPHONE HYDROCHLORIDE 2 MG/1
2 TABLET ORAL EVERY 4 HOURS PRN
Qty: 25 TABLET | Refills: 0 | Status: SHIPPED | OUTPATIENT
Start: 2022-05-15

## 2022-05-15 RX ORDER — DOCUSATE SODIUM 100 MG/1
100 CAPSULE, LIQUID FILLED ORAL 2 TIMES DAILY
Qty: 100 CAPSULE | Refills: 1 | Status: SHIPPED | OUTPATIENT
Start: 2022-05-15

## 2022-05-15 RX ADMIN — CLONAZEPAM 1 MG: 0.5 TABLET ORAL at 09:45

## 2022-05-15 RX ADMIN — POLYETHYLENE GLYCOL 3350 17 G: 17 POWDER, FOR SOLUTION ORAL at 09:44

## 2022-05-15 RX ADMIN — SENNOSIDES AND DOCUSATE SODIUM 1 TABLET: 50; 8.6 TABLET ORAL at 09:44

## 2022-05-15 RX ADMIN — CEFAZOLIN 1 G: 1 INJECTION, POWDER, FOR SOLUTION INTRAMUSCULAR; INTRAVENOUS at 02:11

## 2022-05-15 RX ADMIN — ACETAMINOPHEN 975 MG: 325 TABLET ORAL at 02:11

## 2022-05-15 RX ADMIN — ACETAMINOPHEN 975 MG: 325 TABLET ORAL at 09:43

## 2022-05-15 RX ADMIN — HYDROMORPHONE HYDROCHLORIDE 2 MG: 2 TABLET ORAL at 06:08

## 2022-05-15 RX ADMIN — TAMSULOSIN HYDROCHLORIDE 0.4 MG: 0.4 CAPSULE ORAL at 09:44

## 2022-05-15 RX ADMIN — HYDROMORPHONE HYDROCHLORIDE 2 MG: 2 TABLET ORAL at 02:11

## 2022-05-15 RX ADMIN — DOCUSATE SODIUM 100 MG: 100 CAPSULE, LIQUID FILLED ORAL at 09:45

## 2022-05-15 RX ADMIN — POLYETHYLENE GLYCOL 3350 17 G: 17 POWDER, FOR SOLUTION ORAL at 09:45

## 2022-05-15 RX ADMIN — RIVAROXABAN 10 MG: 10 TABLET, FILM COATED ORAL at 09:44

## 2022-05-15 RX ADMIN — HYDROMORPHONE HYDROCHLORIDE 2 MG: 2 TABLET ORAL at 11:01

## 2022-05-15 ASSESSMENT — ACTIVITIES OF DAILY LIVING (ADL)
ADLS_ACUITY_SCORE: 25
ADLS_ACUITY_SCORE: 34
ADLS_ACUITY_SCORE: 25
ADLS_ACUITY_SCORE: 25
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
## 2022-05-15 NOTE — PLAN OF CARE
Problem: Pain (Hip Fracture Medical Management)  Goal: Acceptable Pain Level  Intervention: Manage Acute Orthopaedic-Related Pain  Recent Flowsheet Documentation  Taken 5/14/2022 2110 by Modesta Contreras RN  Pain Management Interventions: medication (see MAR)  Taken 5/14/2022 1838 by Modesta Contreras RN  Pain Management Interventions: medication (see MAR)  Taken 5/14/2022 1730 by Modesta Contreras RN  Pain Management Interventions: medication (see MAR)  Taken 5/14/2022 1722 by Modesta Contreras RN  Pain Management Interventions: cold applied   Goal Outcome Evaluation:    Vss on room air.  Ambulated in hallway.  Voiding and tolerating diet.  Dressing CDI.  Pain well managed with iv and oral pain medication and ice packs .

## 2022-05-15 NOTE — PLAN OF CARE
Physical Therapy Discharge Summary    Reason for therapy discharge:    All goals and outcomes met, no further needs identified.    Progress towards therapy goal(s). See goals on Care Plan in [facility] electronic health record for goal details.  Goals met    Therapy recommendation(s):    Continue home exercise program.

## 2022-05-15 NOTE — PLAN OF CARE
Patient vital signs are at baseline: Yes  Patient able to ambulate as they were prior to admission or with assist devices provided by therapies during their stay:  Yes  Patient MUST void prior to discharge:  Yes  Patient able to tolerate oral intake:  Yes  Pain has adequate pain control using Oral analgesics:  Yes  Does patient have an identified :  Yes  Has goal D/C date and time been discussed with patient:  Yes    Patient is not sure yet about discharging today, he is concerned about therapies and getting around at home. LS clear, BS active, CMS intact. Ace wrap to left leg CDI. Ice intermittently to thigh/hip. Pain is helped by oral scheduled Tylenol and prn Dilaudid.     Christina Posey RN

## 2022-05-15 NOTE — PROGRESS NOTES
Ortho Progress Note      Post-operative Day: 1 Day Post-Op  S/P Procedure(s):  OPEN REDUCTION INTERNAL FIXATION OF LEFT PREIPROSTHETIC FEMUR FRACTURE   Patient doing well this am and feels ready to discharge. He is on xarelto as he has an allergy to ASA and dilaudid for pain control. Allergy to oxycodone, is tolerated dilaudid fine. He has been up ambulating with walker, TTWB without difficulty.     Subjective:  Pain: mild to moder  Chest pain, SOB:  no  Nausea, vomiting:  no  Lightheadedness, dizziness:  no  Neuro:  Patient denies new onset numbness or paresthesias    Objective:  /71 (BP Location: Left arm)   Pulse 74   Temp 98.2  F (36.8  C) (Oral)   Resp 16   SpO2 97%   Gen: A&O x 3. NAD. Appears comfortable  Wound status: covered  Circulation, motion and sensation: Dorsiflexion/plantarflexion intact and equal bilaterally; distal lower extremity sensation is intact and equal bilaterally   Swelling: mild   Calf tenderness: calves are soft and non-tender bilaterally     Pertinent Labs   Lab Results: personally reviewed.   Lab Results   Component Value Date    INR 1.20 (H) 05/13/2022     Lab Results   Component Value Date    WBC 10.0 05/13/2022    HGB 12.9 (L) 05/15/2022    HCT 49.0 05/13/2022    MCV 86 05/13/2022     05/13/2022     Lab Results   Component Value Date     05/13/2022    CO2 24 05/13/2022       Assessment: POD#1 s/p femur ORIF for periprosthetic fracture    Plan:   TTWB with walker  24 hours post op ancef  Pain control -  oral dilaudid due to allergy  DVT: Xarelto , SCDs, early mobilization  PT/OT  Keep dressing in place, may remove ACE wrap POD3  Patient will follow up with Dr. Oliva for post op management    Report completed by:  Marybeth Jimenez PA-C PA-C  Date: 5/15/2022  Time: 9:38 AM

## 2022-05-15 NOTE — PLAN OF CARE
Pt anxious but cooperative. Pt did well with therapy. Pain is managed with q4hour pain medication. Plan for discharge.   Reviewed discharge instructions with wife and pt. All belongings sent home but could not find phone . Prescriptions also sent with pt and wife and reviewed all instructions.   VSS- Pain medication given prior to discharge.     Pt discharged with spouse at 1130 am.    Patient vital signs are at baseline: Yes  Patient able to ambulate as they were prior to admission or with assist devices provided by therapies during their stay:  Yes  Patient MUST void prior to discharge:  Yes  Patient able to tolerate oral intake:  Yes  Pain has adequate pain control using Oral analgesics:  Yes  Does patient have an identified :  Yes  Has goal D/C date and time been discussed with patient:  Yes   Problem: Plan of Care - These are the overarching goals to be used throughout the patient stay.    Goal: Optimal Comfort and Wellbeing  Outcome: Ongoing, Progressing     Problem: Plan of Care - These are the overarching goals to be used throughout the patient stay.    Goal: Readiness for Transition of Care  Outcome: Met  Flowsheets (Taken 5/15/2022 1317)  Concerns to be Addressed: all concerns addressed in this encounter  Intervention: Mutually Develop Transition Plan  Recent Flowsheet Documentation  Taken 5/15/2022 1317 by Betty Marquis RN  Concerns to be Addressed: all concerns addressed in this encounter     Problem: Fracture Stabilization and Management (Hip Fracture Medical Management)  Goal: Fracture Stability  Outcome: Ongoing, Progressing     Problem: Pain (Hip Fracture Medical Management)  Goal: Acceptable Pain Level  Outcome: Ongoing, Progressing   Goal Outcome Evaluation:    Plan of Care Reviewed With: patient, spouse     Overall Patient Progress: improving

## 2022-05-15 NOTE — PROGRESS NOTES
05/15/22 0735   Quick Adds   Type of Visit Initial Occupational Therapy Evaluation   Living Environment   People in Home spouse   Current Living Arrangements house   Home Accessibility no concerns   Transportation Anticipated family or friend will provide   Living Environment Comments All needs met on main level. Pt has walkin shower - no chair or grab bars. Has raised toilet. Pt has walker and cane   Self-Care   Usual Activity Tolerance excellent   Equipment Currently Used at Home none   Fall history within last six months yes   Number of times patient has fallen within last six months 1   Activity/Exercise/Self-Care Comment PT INd w/ ADLs and IADLs   General Information   Onset of Illness/Injury or Date of Surgery 05/13/22   Referring Physician Anton Wen MD   Additional Occupational Profile Info/Pertinent History of Current Problem ORIF Femur   Existing Precautions/Restrictions weight bearing   Left Lower Extremity (Weight-bearing Status) toe touch weight-bearing (TTWB)   Right Lower Extremity (Weight-bearing Status) full weight-bearing (FWB)   Cognitive Status Examination   Orientation Status orientation to person, place and time   Visual Perception   Visual Impairment/Limitations corrective lenses full-time   Sensory   Sensory Quick Adds No deficits were identified   Pain Assessment   Patient Currently in Pain No   Integumentary/Edema   Integumentary/Edema no deficits were identifed   Posture   Posture not impaired   Range of Motion Comprehensive   General Range of Motion no range of motion deficits identified   Strength Comprehensive (MMT)   General Manual Muscle Testing (MMT) Assessment no strength deficits identified   Muscle Tone Assessment   Muscle Tone Quick Adds No deficits were identified   Coordination   Upper Extremity Coordination No deficits were identified   Bed Mobility   Bed Mobility supine-sit;sit-supine   Comment (Bed Mobility) SBA   Transfers   Transfers bed-chair transfer;sit-stand  transfer;toilet transfer;shower transfer   Transfer Comments SBA-CGA for all transfers   Activities of Daily Living   BADL Assessment/Intervention lower body dressing;toileting   Lower Body Dressing Assessment/Training   [place] Level (Lower Body Dressing) minimum assist (75% patient effort)   Toileting   [place] Level (Toileting) contact guard assist   Clinical Impression   Criteria for Skilled Therapeutic Interventions Met (OT) Yes, treatment indicated   OT Diagnosis decreased ind w/ ADLs   Influenced by the following impairments ORIF Femur   OT Problem List-Impairments impacting ADL activity tolerance impaired;mobility;post-surgical precautions   Assessment of Occupational Performance 3-5 Performance Deficits   Identified Performance Deficits LE dressing, tranfsers   Planned Therapy Interventions (OT) ADL retraining;bed mobility training;transfer training   Clinical Decision Making Complexity (OT) moderate complexity   Risk & Benefits of therapy have been explained evaluation/treatment results reviewed;patient   OT Discharge Planning   OT Discharge Recommendation (DC Rec) (S)  home with assist   OT Rationale for DC Rec Pt tolerating therapy well. Pt has all needs met on main level of home and will have wife available to help at all times to assist as needed w/ ADLs   Total Evaluation Time (Minutes)   Total Evaluation Time (Minutes) 10   OT Goals   Therapy Frequency (OT) One time eval and treatment   OT Goals Lower Body Dressing;Bed Mobility;Toilet Transfer/Toileting   OT: Lower Body Dressing Modified independent;using adaptive equipment;within precautions;Goal Met;Completed   OT: Bed Mobility Modified independent;supine to/from sitting;rolling;bridging;within precautions;Goal Met;Completed   OT: Toilet Transfer/Toileting Modified independent;toilet transfer;Goal Met;within precautions;Completed

## 2022-05-15 NOTE — PROGRESS NOTES
S: Recovering uneventfully on the floor, pain controlled    O: No acute distress, well-appearing.  Thigh soft and compressible.  Dressing clean dry and intact.  5 out of 5 dorsiflexion, plantarflexion, EHL.  Sensation tact light touch in the sural, saphenous, superficial peroneal, deep peroneal distributions.  Can actively extend his knee and fires his quad.  2+ DP pulse.  Digits warm well perfused with brisk cap refill    A: POD0 s/p L periprosthetic femur ORIF    P:   TTWB with walker  24 hours post op ancef  Pain control - will try oral dilaudid due to allergy  DVT: Xarelto to start tomorrow, SCDs, early mobilization  PT/OT  Keep dressing in place, may remove ACE wrap POD3  Patient will follow up with me or Dr. Oliva for post op management    FROILAN TORRES MD

## 2022-05-15 NOTE — PLAN OF CARE
Occupational Therapy Discharge Summary    Reason for therapy discharge:    All goals and outcomes met, no further needs identified.    Progress towards therapy goal(s). See goals on Care Plan in [facility] electronic health record for goal details.  Goals met    Therapy recommendation(s):    No further therapy is recommended.

## 2024-03-20 ENCOUNTER — MEDICAL CORRESPONDENCE (OUTPATIENT)
Dept: HEALTH INFORMATION MANAGEMENT | Facility: CLINIC | Age: 64
End: 2024-03-20
Payer: COMMERCIAL

## 2024-03-21 ENCOUNTER — ANCILLARY PROCEDURE (OUTPATIENT)
Dept: GENERAL RADIOLOGY | Facility: CLINIC | Age: 64
End: 2024-03-21
Attending: PHYSICAL MEDICINE & REHABILITATION
Payer: COMMERCIAL

## 2024-03-21 DIAGNOSIS — M54.50 LOW BACK PAIN: ICD-10-CM

## 2024-07-21 ENCOUNTER — HOSPITAL ENCOUNTER (EMERGENCY)
Facility: CLINIC | Age: 64
Discharge: HOME | End: 2024-07-21
Attending: EMERGENCY MEDICINE | Admitting: EMERGENCY MEDICINE
Payer: COMMERCIAL

## 2024-07-21 ENCOUNTER — APPOINTMENT (OUTPATIENT)
Dept: RADIOLOGY | Facility: CLINIC | Age: 64
End: 2024-07-21
Attending: EMERGENCY MEDICINE
Payer: COMMERCIAL

## 2024-07-21 VITALS
DIASTOLIC BLOOD PRESSURE: 63 MMHG | OXYGEN SATURATION: 94 % | HEIGHT: 74 IN | RESPIRATION RATE: 20 BRPM | HEART RATE: 70 BPM | WEIGHT: 215 LBS | BODY MASS INDEX: 27.59 KG/M2 | SYSTOLIC BLOOD PRESSURE: 132 MMHG | TEMPERATURE: 99.8 F

## 2024-07-21 DIAGNOSIS — U07.1 COVID-19 VIRUS INFECTION: ICD-10-CM

## 2024-07-21 LAB
ALBUMIN SERPL BCG-MCNC: 4 G/DL (ref 3.5–5.2)
ALP SERPL-CCNC: 99 U/L (ref 40–150)
ALT SERPL W P-5'-P-CCNC: 17 U/L (ref 0–70)
ANION GAP SERPL CALCULATED.3IONS-SCNC: 7 MMOL/L (ref 7–15)
AST SERPL W P-5'-P-CCNC: 24 U/L (ref 0–45)
BASOPHILS # BLD AUTO: 0.2 10E3/UL (ref 0–0.2)
BASOPHILS NFR BLD AUTO: 1 %
BILIRUB SERPL-MCNC: 0.9 MG/DL
BUN SERPL-MCNC: 11.1 MG/DL (ref 8–23)
CALCIUM SERPL-MCNC: 8.8 MG/DL (ref 8.8–10.4)
CHLORIDE SERPL-SCNC: 104 MMOL/L (ref 98–107)
CREAT SERPL-MCNC: 0.72 MG/DL (ref 0.67–1.17)
EGFRCR SERPLBLD CKD-EPI 2021: >90 ML/MIN/1.73M2
EOSINOPHIL # BLD AUTO: 0.2 10E3/UL (ref 0–0.7)
EOSINOPHIL NFR BLD AUTO: 2 %
ERYTHROCYTE [DISTWIDTH] IN BLOOD BY AUTOMATED COUNT: 21.4 % (ref 10–15)
GLUCOSE SERPL-MCNC: 132 MG/DL (ref 70–99)
HCO3 SERPL-SCNC: 26 MMOL/L (ref 22–29)
HCT VFR BLD AUTO: 54.5 % (ref 40–53)
HGB BLD-MCNC: 17.2 G/DL (ref 13.3–17.7)
IMM GRANULOCYTES # BLD: 0.1 10E3/UL
IMM GRANULOCYTES NFR BLD: 1 %
LYMPHOCYTES # BLD AUTO: 0.9 10E3/UL (ref 0.8–5.3)
LYMPHOCYTES NFR BLD AUTO: 7 %
MAGNESIUM SERPL-MCNC: 2 MG/DL (ref 1.7–2.3)
MCH RBC QN AUTO: 23.1 PG (ref 26.5–33)
MCHC RBC AUTO-ENTMCNC: 31.6 G/DL (ref 31.5–36.5)
MCV RBC AUTO: 73 FL (ref 78–100)
MONOCYTES # BLD AUTO: 1.3 10E3/UL (ref 0–1.3)
MONOCYTES NFR BLD AUTO: 11 %
NEUTROPHILS # BLD AUTO: 9.9 10E3/UL (ref 1.6–8.3)
NEUTROPHILS NFR BLD AUTO: 79 %
NRBC # BLD AUTO: 0 10E3/UL
NRBC BLD AUTO-RTO: 0 /100
PLATELET # BLD AUTO: 416 10E3/UL (ref 150–450)
POTASSIUM SERPL-SCNC: 3.7 MMOL/L (ref 3.4–5.3)
PROT SERPL-MCNC: 6.3 G/DL (ref 6.4–8.3)
RBC # BLD AUTO: 7.45 10E6/UL (ref 4.4–5.9)
SODIUM SERPL-SCNC: 137 MMOL/L (ref 135–145)
WBC # BLD AUTO: 12.5 10E3/UL (ref 4–11)

## 2024-07-21 PROCEDURE — 250N000013 HC RX MED GY IP 250 OP 250 PS 637: Performed by: EMERGENCY MEDICINE

## 2024-07-21 PROCEDURE — 96375 TX/PRO/DX INJ NEW DRUG ADDON: CPT

## 2024-07-21 PROCEDURE — 71045 X-RAY EXAM CHEST 1 VIEW: CPT

## 2024-07-21 PROCEDURE — 36415 COLL VENOUS BLD VENIPUNCTURE: CPT | Performed by: EMERGENCY MEDICINE

## 2024-07-21 PROCEDURE — 85025 COMPLETE CBC W/AUTO DIFF WBC: CPT | Performed by: EMERGENCY MEDICINE

## 2024-07-21 PROCEDURE — 96361 HYDRATE IV INFUSION ADD-ON: CPT

## 2024-07-21 PROCEDURE — 258N000003 HC RX IP 258 OP 636: Performed by: EMERGENCY MEDICINE

## 2024-07-21 PROCEDURE — 83735 ASSAY OF MAGNESIUM: CPT | Performed by: EMERGENCY MEDICINE

## 2024-07-21 PROCEDURE — 250N000011 HC RX IP 250 OP 636: Performed by: EMERGENCY MEDICINE

## 2024-07-21 PROCEDURE — 96374 THER/PROPH/DIAG INJ IV PUSH: CPT

## 2024-07-21 PROCEDURE — 99284 EMERGENCY DEPT VISIT MOD MDM: CPT | Mod: 25

## 2024-07-21 PROCEDURE — 271N000002 HC RX 271: Performed by: EMERGENCY MEDICINE

## 2024-07-21 PROCEDURE — 80053 COMPREHEN METABOLIC PANEL: CPT | Performed by: EMERGENCY MEDICINE

## 2024-07-21 RX ORDER — DEXAMETHASONE SODIUM PHOSPHATE 10 MG/ML
6 INJECTION, SOLUTION INTRAMUSCULAR; INTRAVENOUS ONCE
Status: COMPLETED | OUTPATIENT
Start: 2024-07-21 | End: 2024-07-21

## 2024-07-21 RX ORDER — ALBUTEROL SULFATE 90 UG/1
6 AEROSOL, METERED RESPIRATORY (INHALATION) ONCE
Status: COMPLETED | OUTPATIENT
Start: 2024-07-21 | End: 2024-07-21

## 2024-07-21 RX ORDER — KETOROLAC TROMETHAMINE 15 MG/ML
15 INJECTION, SOLUTION INTRAMUSCULAR; INTRAVENOUS ONCE
Status: COMPLETED | OUTPATIENT
Start: 2024-07-21 | End: 2024-07-21

## 2024-07-21 RX ORDER — INHALER, ASSIST DEVICES
1 SPACER (EA) MISCELLANEOUS ONCE
Status: COMPLETED | OUTPATIENT
Start: 2024-07-21 | End: 2024-07-21

## 2024-07-21 RX ORDER — BENZONATATE 100 MG/1
100 CAPSULE ORAL 3 TIMES DAILY PRN
Qty: 20 CAPSULE | Refills: 0 | Status: SHIPPED | OUTPATIENT
Start: 2024-07-21

## 2024-07-21 RX ADMIN — Medication 1 EACH: at 22:17

## 2024-07-21 RX ADMIN — SODIUM CHLORIDE 1000 ML: 9 INJECTION, SOLUTION INTRAVENOUS at 21:22

## 2024-07-21 RX ADMIN — KETOROLAC TROMETHAMINE 15 MG: 15 INJECTION, SOLUTION INTRAMUSCULAR; INTRAVENOUS at 21:24

## 2024-07-21 RX ADMIN — ALBUTEROL SULFATE 6 PUFF: 90 AEROSOL, METERED RESPIRATORY (INHALATION) at 22:16

## 2024-07-21 RX ADMIN — DEXAMETHASONE SODIUM PHOSPHATE 6 MG: 10 INJECTION INTRAMUSCULAR; INTRAVENOUS at 21:22

## 2024-07-21 RX ADMIN — SODIUM CHLORIDE 500 ML: 9 INJECTION, SOLUTION INTRAVENOUS at 22:23

## 2024-07-21 ASSESSMENT — ACTIVITIES OF DAILY LIVING (ADL)
ADLS_ACUITY_SCORE: 38

## 2024-07-21 ASSESSMENT — COLUMBIA-SUICIDE SEVERITY RATING SCALE - C-SSRS
1. IN THE PAST MONTH, HAVE YOU WISHED YOU WERE DEAD OR WISHED YOU COULD GO TO SLEEP AND NOT WAKE UP?: NO
6. HAVE YOU EVER DONE ANYTHING, STARTED TO DO ANYTHING, OR PREPARED TO DO ANYTHING TO END YOUR LIFE?: NO
2. HAVE YOU ACTUALLY HAD ANY THOUGHTS OF KILLING YOURSELF IN THE PAST MONTH?: NO

## 2024-07-22 ASSESSMENT — ENCOUNTER SYMPTOMS
COUGH: 1
MYALGIAS: 1
SORE THROAT: 1
SHORTNESS OF BREATH: 1
FEVER: 1
ABDOMINAL PAIN: 0
VOMITING: 0
DIARRHEA: 0
NAUSEA: 0

## 2024-07-22 NOTE — ED TRIAGE NOTES
Patient test covid + today, symptoms started Friday. Complains of mild headache. Took tylenol around 2000 this evening. Noted temp at home 101. Denies difficulty breathing.      Triage Assessment (Adult)       Row Name 07/21/24 2047          Triage Assessment    Airway WDL WDL        Respiratory WDL    Respiratory WDL WDL        Skin Circulation/Temperature WDL    Skin Circulation/Temperature WDL WDL        Cardiac WDL    Cardiac WDL WDL        Peripheral/Neurovascular WDL    Peripheral Neurovascular WDL WDL        Cognitive/Neuro/Behavioral WDL    Cognitive/Neuro/Behavioral WDL WDL

## 2024-07-22 NOTE — ED PROVIDER NOTES
NAME: Derik Quiros  AGE: 63 year old male  YOB: 1960  MRN: [id number]  EVALUATION DATE & TIME: 2024  8:45 PM    PCP: aDngelo Osorio    ED PROVIDER: Keanu Gonzalez M.D.      Chief Complaint   Patient presents with    Covid Concern         FINAL IMPRESSION:  1. COVID-19 virus infection        MEDICAL DECISION MAKIN:57 PM I met with the patient, obtained history, performed an initial exam, and discussed options and plan for diagnostics and treatment here in the ED.   10:04 PM I rechecked and updated patient on results. Patient feels improved. Will give additional fluids.  10:10 PM I rechecked patient. Patient states he does not take blood thinners.   10:20 PM We discussed the plan for discharge and the patient is agreeable. Reviewed supportive cares, symptomatic treatment, outpatient follow up, and reasons to return to the Emergency Department. Patient to be discharged by ED RN.     Patient was clinically assessed and consented to treatment. After assessment, medical decision making and workup were discussed with the patient. The patient was agreeable to plan for testing, workup, and treatment.  Pertinent Labs & Imaging studies reviewed. (See chart for details)       Medical Decision Making  Obtained supplemental history:Supplemental history obtained?: Documented in chart  Reviewed external records: External records reviewed?: Documented in chart  Care impacted by chronic illness:N/A  Care significantly affected by social determinants of health:Access to Medical Care  Did you consider but not order tests?: In addition to work-up documented, I considered the following work up:   Did you interpret images independently?: Independent interpretation of ECG and images noted in documentation, when applicable.  Consultation discussion with other provider:Did you involve another provider (consultant, , pharmacy, etc.)?: No  Discharge. I prescribed additional prescription strength  medication(s) as charted. See documentation for any additional details.    Derik Quiros is a 63 year old male who presents with COVID concern.   Differential diagnosis includes but not limited to hypoxemia, COVID-19, pneumonia, bronchitis, dehydration, pulmonary embolism.  Patient has been vaccinated and boosted and was discovered to have COVID-19 this morning.  He did call the nurse line and was instructed come to the ER.  Patient does not appear in any acute distress but does have slightly diminished sounds at the bases.  Patient however has oxygenation of  95-96% on room air.  He does not have any accessory muscle use and no wheezing.  Patient does appear warm and febrile with some bodyaches.  IV established patient was given Toradol, IV fluids, Decadron and labs showed slight leukocytosis, otherwise unremarkable metabolic panel and chest x-ray did show some inflammation and atelectasis at the base.  Patient however is showing no signs of acute distress with breathing and we did try an albuterol inhaler which  helped somewhat open his breathing.  He was feeling much better after controlling his fever and bodyaches.  Patient was given good fluids and rehydrated.  Patient felt well to go home we did discuss possibility of Paxlovid.  Patient was previously on Xarelto but is no longer on that and after review of his medications and creatinine patient would be candidate for Paxlovid.  Prescription for this will be written as well as Tessalon Perles and albuterol inhaler.    0 minutes of critical care time    MEDICATIONS GIVEN IN THE EMERGENCY:  Medications   sodium chloride 0.9% BOLUS 1,000 mL (0 mLs Intravenous Stopped 7/21/24 2216)   ketorolac (TORADOL) injection 15 mg (15 mg Intravenous $Given 7/21/24 2124)   dexAMETHasone PF (DECADRON) injection 6 mg (6 mg Intravenous $Given 7/21/24 2122)   albuterol (PROVENTIL HFA/VENTOLIN HFA) inhaler (6 puffs Inhalation $Given 7/21/24 2216)   aerochamber with  mouthpiece (NO mask) - > 5 years 1 each (1 each Inhalation $Given 7/21/24 2101)   sodium chloride 0.9% BOLUS 500 mL (0 mLs Intravenous Stopped 7/21/24 3343)       NEW PRESCRIPTIONS STARTED AT TODAY'S ER VISIT:  Discharge Medication List as of 7/21/2024 10:50 PM        START taking these medications    Details   benzonatate (TESSALON) 100 MG capsule Take 1 capsule (100 mg) by mouth 3 times daily as needed for cough, Disp-20 capsule, R-0, Local Print      nirmatrelvir and ritonavir (PAXLOVID) 300 mg/100 mg therapy pack Take 3 tablets by mouth 2 times daily for 5 days, Disp-30 tablet, R-0, Local PrintDate of symptom onset: 7/19/24; Risk criteria met: Yes; Weight >40 kg Yes; Renal fxn: normal;  Drug-Drug interactions reviewed & addressed: Yes                =================================================================    HPI    Patient information was obtained from: Patient    Use of : N/A         Derik Quiros is a 63 year old male with no pertinent past medical history, who presents to the ED via walk-in for the evaluation of generalized body aches, shortness of breath.     Patient reports that he started feeling unwell with generalized body aches two days ago in the evening. He states that shortly after, he developed a sore throat and a mild cough. This morning, he took a COVID test and it was positive. He has been monitoring his SATs at home and it has been 93 and above. Tonight, he called the nurse line since his temperature was 100.4 degrees and was mildly short of breath. Wife states he was having some difficulties breathing so they decided to come in. Currently, he states his oxygenation is fine. He just feels achy and feverish. Otherwise, he denies any nausea, vomiting, abdominal pain, diarrhea, rashes, and chest pain. No other complaints at this time.    REVIEW OF SYSTEMS   Review of Systems   Constitutional:  Positive for fever.   HENT:  Positive for sore throat.    Respiratory:   Positive for cough and shortness of breath.    Cardiovascular:  Negative for chest pain.   Gastrointestinal:  Negative for abdominal pain, diarrhea, nausea and vomiting.   Musculoskeletal:  Positive for myalgias.   Skin:  Negative for rash.   All other systems reviewed and are negative.       PAST MEDICAL HISTORY:  History reviewed. No pertinent past medical history.    PAST SURGICAL HISTORY:  Past Surgical History:   Procedure Laterality Date    OPEN REDUCTION INTERNAL FIXATION FEMUR MIDSHAFT Left 5/14/2022    Procedure: OPEN REDUCTION INTERNAL FIXATION OF LEFT PREIPROSTHETIC FEMUR FRACTURE;  Surgeon: Srinivasan Soto MD;  Location: [facility] Main OR       CURRENT MEDICATIONS:    No current facility-administered medications for this encounter.    Current Outpatient Medications:     benzonatate (TESSALON) 100 MG capsule, Take 1 capsule (100 mg) by mouth 3 times daily as needed for cough, Disp: 20 capsule, Rfl: 0    nirmatrelvir and ritonavir (PAXLOVID) 300 mg/100 mg therapy pack, Take 3 tablets by mouth 2 times daily for 5 days, Disp: 30 tablet, Rfl: 0    acetaminophen (TYLENOL) 325 MG tablet, Take 2 tablets (650 mg) by mouth every 4 hours as needed for other (mild pain), Disp: 100 tablet, Rfl: 0    amoxicillin (AMOXIL) 500 MG tablet, Take 2,000 mg by mouth See Admin Instructions 4 tablets take 1 hour prior to dental appointment, Disp: , Rfl:     clonazePAM (KLONOPIN) 1 MG tablet, Take 1 mg by mouth 2 times daily, Disp: , Rfl:     docusate sodium (COLACE) 100 MG capsule, Take 1 capsule (100 mg) by mouth 2 times daily, Disp: 100 capsule, Rfl: 1    HYDROmorphone (DILAUDID) 2 MG tablet, Take 1 tablet (2 mg) by mouth every 4 hours as needed for pain or moderate to severe pain, Disp: 25 tablet, Rfl: 0    hydrOXYzine (ATARAX) 25 MG tablet, Take 1 tablet (25 mg) by mouth every 6 hours as needed for itching or anxiety (with pain, moderate pain), Disp: 30 tablet, Rfl: 0    tamsulosin (FLOMAX) 0.4 MG capsule, Take 0.4 mg by  "mouth daily, Disp: , Rfl:     vitamin D3 (CHOLECALCIFEROL) 50 mcg (2000 units) tablet, Take 1 tablet by mouth daily, Disp: , Rfl:     ALLERGIES:  Allergies   Allergen Reactions    Aspirin Hives and Swelling     Edema.   Has taken ibuprofen in the past and tolerated.    Contrast Dye Hives    Hydrocodone Hives     Welts  Norco (hydrocodone-acetaminophen), prescribed Feb 2022 as alternative to oxycodone after developing similar reaction.   Has tolerated acetaminophen in the past.     Iodinated Diagnostic Agents [Iodinated Contrast Media] Hives    Oxycodone Hives     Welts  Prescribed Feb 2022.    Phenobarbital Hives    Codeine Rash       FAMILY HISTORY:  History reviewed. No pertinent family history.    SOCIAL HISTORY:   Social History     Socioeconomic History    Marital status:      Social Determinants of Health     Financial Resource Strain: Not At Risk (8/7/2023)    Received from [facility]    Financial Resource Strain     Is it hard for you to pay for the very basics like food, housing, medical care or heating?: No   Food Insecurity: Not At Risk (8/7/2023)    Received from [facility]    Food Insecurity     Does your food run out before you have the money to buy more?: No   Transportation Needs: Not At Risk (8/7/2023)    Received from [facility]    Transportation Needs     Does a lack of transportation keep you from your medical appointments or from getting your medications?: No       PHYSICAL EXAM:    Vitals: /63   Pulse 70   Temp 99.8  F (37.7  C) (Oral)   Resp 20   Ht 1.88 m (6' 2\")   Wt 97.5 kg (215 lb)   SpO2 94%   BMI 27.60 kg/m     Physical Exam  Vitals and nursing note reviewed.   Constitutional:       General: He is not in acute distress.     Appearance: Normal appearance. He is normal weight. He is not ill-appearing or toxic-appearing.   HENT:      Head: Normocephalic.      Nose: Nose normal.      Mouth/Throat:      Mouth: " Mucous membranes are dry.      Pharynx: Posterior oropharyngeal erythema present. No oropharyngeal exudate.   Eyes:      Conjunctiva/sclera: Conjunctivae normal.   Cardiovascular:      Rate and Rhythm: Normal rate and regular rhythm.      Heart sounds: Normal heart sounds.   Pulmonary:      Effort: Pulmonary effort is normal. No respiratory distress.      Breath sounds: No stridor. Examination of the right-lower field reveals decreased breath sounds. Examination of the left-lower field reveals decreased breath sounds. Decreased breath sounds present. No wheezing, rhonchi or rales.   Abdominal:      Palpations: Abdomen is soft.      Tenderness: There is no abdominal tenderness.   Musculoskeletal:      Cervical back: Normal range of motion and neck supple. No tenderness.      Right lower leg: No edema.      Left lower leg: No edema.   Lymphadenopathy:      Cervical: Cervical adenopathy present.   Skin:     General: Skin is warm and dry.      Coloration: Skin is not jaundiced or pale.   Neurological:      General: No focal deficit present.      Mental Status: He is alert.   Psychiatric:         Behavior: Behavior normal.           LAB:  All pertinent labs reviewed and interpreted.  Labs Ordered and Resulted from Time of ED Arrival to Time of ED Departure   COMPREHENSIVE METABOLIC PANEL - Abnormal       Result Value    Sodium 137      Potassium 3.7      Carbon Dioxide (CO2) 26      Anion Gap 7      Urea Nitrogen 11.1      Creatinine 0.72      GFR Estimate >90      Calcium 8.8      Chloride 104      Glucose 132 (*)     Alkaline Phosphatase 99      AST 24      ALT 17      Protein Total 6.3 (*)     Albumin 4.0      Bilirubin Total 0.9     CBC WITH PLATELETS AND DIFFERENTIAL - Abnormal    WBC Count 12.5 (*)     RBC Count 7.45 (*)     Hemoglobin 17.2      Hematocrit 54.5 (*)     MCV 73 (*)     MCH 23.1 (*)     MCHC 31.6      RDW 21.4 (*)     Platelet Count 416      % Neutrophils 79      % Lymphocytes 7      % Monocytes 11       % Eosinophils 2      % Basophils 1      % Immature Granulocytes 1      NRBCs per 100 WBC 0      Absolute Neutrophils 9.9 (*)     Absolute Lymphocytes 0.9      Absolute Monocytes 1.3      Absolute Eosinophils 0.2      Absolute Basophils 0.2      Absolute Immature Granulocytes 0.1      Absolute NRBCs 0.0     MAGNESIUM - Normal    Magnesium 2.0         RADIOLOGY:  XR Chest Port 1 View   Final Result   IMPRESSION: The lungs are hyperinflated. Trace left pleural effusion. Mild bibasilar atelectasis. Normal thorax. Cardiomegaly without overt pulmonary edema. Sternotomy wires. No acute osseous findings.              PROCEDURES:   Procedures       I, Annita Reynoso, am serving as a scribe to document services personally performed by Dr. Keanu Gonzalez  based on my observation and the provider's statements to me. I, Keanu Gonzalez MD attest that Annita Reynoso is acting in a scribe capacity, has observed my performance of the services and has documented them in accordance with my direction.      Keanu Gonzalez M.D.  Emergency Medicine  [facility] Emergency Department       Keanu Gonzalez MD  07/22/24 2397

## 2024-07-22 NOTE — DISCHARGE INSTRUCTIONS
As discussed in ER, a prescription was sent for Paxlovid which you may fill at any outpatient pharmacy.  Recommend calling first to make sure they have it in stock.  Additionally you may use the inhaler every 6 hours as needed for any shortness of breath, persistent coughing, or wheezing.

## 2024-12-11 ENCOUNTER — TRANSCRIBE ORDERS (OUTPATIENT)
Dept: OTHER | Age: 64
End: 2024-12-11

## 2024-12-11 ENCOUNTER — PATIENT OUTREACH (OUTPATIENT)
Dept: ONCOLOGY | Facility: CLINIC | Age: 64
End: 2024-12-11
Payer: MEDICARE

## 2024-12-11 DIAGNOSIS — D45 POLYCYTHEMIA VERA (H): Primary | ICD-10-CM

## 2024-12-11 NOTE — PROGRESS NOTES
New Patient Oncology Nurse Navigator Note     Referral Received: 12/11/24      Referring Clinic/Organization: Self Referred     Referred to: Benign Hematology    Requested provider (if applicable): First available - did not specify      Evaluation for :   Diagnosis   D45 (ICD-10-CM) - Polycythemia vera (H)      Clinical History (per Nurse review of records provided):      WBC  3.5 - 10.5 x10(9)/L 17.7 High    RBC  4.32 - 5.72 x10(12)/L 8.12 High    Hemoglobin  13.5 - 17.5 g/dL 18.7 High    HCT  38.8 - 50.0 % 61.4 High    MCV  80.0 - 100.0 fL 75.6 Low    MCH  27.6 - 33.3 pg 23.0 Low    MCHC  31.5 - 35.2 g/dL 30.5 Low    RDW  11.9 - 15.5 % 21.3 High    Platelets  150 - 450 x10(9)/L 573 High    Neutrophil Absolute  1.7 - 7.0 10(9)/L 14.9 High    Lymphocyte Absolute  1.0 - 4.8 10(9)/L 1.3   Monocyte Absolute  0.2 - 0.9 10(9)/L 0.5   Eosinophil Absolute  0.0 - 0.5 10(9)/L 0.7 High    Basophil Absolute  0.0 - 0.3 10(9)/L 0.2   Immature Granulocyte %  0.0 - 0.5 % 0.5   Resulting Agency [facility] ()       Specimen Collected: 12/06/24  2:28 PM     Case Report Peripheral Smear                                  Case: [id number]                                  Authorizing Provider:  Dangelo Osorio MD          Collected:           12/06/2024 1428              Ordering Location:     [facility]  Received:            12/06/2024 1428              Pathologist:           Karen Munroe MD                                                      Specimen:    Blood, Venous                                                                           FINAL DIAGNOSIS    Peripheral blood, morphology:  Polycythemia with microcytic hypochromic red cells  Leukocytosis with absolute neutrophilia and slight eosinophilia  Thrombocytosis with a subset of atypical giant platelets     Comment:  These findings are highly suspicious for polycythemia vera. Hematology/oncology referral is recommended.       Records Location: [facility]  Everywhere     Additional testing needed prior to consult:     ?    Referral updates and Plan:     12/11/2024 12:43 PM -  Referral received and reviewed. Called pt at this time, introduced my role as nurse navigator with [facility] Hematology/Oncology dept and that we have recd the referral for dx of polycythemia vera.  Pt confirms they are aware of the referral and ready to schedule  Provided contact information if future questions arise.     -Transferred pt to NPS line [phone] to schedule appt per scheduling instructions.    Cassidy Leonard, JIMN, RN  Hematology/Oncology Nurse Navigator  [facility]  [phone] / [phone]

## 2024-12-11 NOTE — TELEPHONE ENCOUNTER
RECORDS STATUS - ALL OTHER DIAGNOSIS      RECORDS RECEIVED FROM: [facility]   NOTES STATUS DETAILS   OFFICE NOTE from referring provider SELF REFERRAL    MEDICATION LIST [facility]    LABS     PATHOLOGY REPORTS     ANYTHING RELATED TO DIAGNOSIS [facility] 12/6/2024

## 2024-12-12 ENCOUNTER — PRE VISIT (OUTPATIENT)
Dept: ONCOLOGY | Facility: CLINIC | Age: 64
End: 2024-12-12
Payer: MEDICARE

## 2024-12-12 ENCOUNTER — PATIENT OUTREACH (OUTPATIENT)
Dept: ONCOLOGY | Facility: CLINIC | Age: 64
End: 2024-12-12
Payer: MEDICARE

## 2024-12-12 ENCOUNTER — INFUSION THERAPY VISIT (OUTPATIENT)
Dept: INFUSION THERAPY | Facility: HOSPITAL | Age: 64
End: 2024-12-12
Attending: INTERNAL MEDICINE
Payer: MEDICARE

## 2024-12-12 ENCOUNTER — LAB (OUTPATIENT)
Dept: LAB | Facility: CLINIC | Age: 64
End: 2024-12-12
Attending: INTERNAL MEDICINE
Payer: MEDICARE

## 2024-12-12 ENCOUNTER — ONCOLOGY VISIT (OUTPATIENT)
Dept: ONCOLOGY | Facility: CLINIC | Age: 64
End: 2024-12-12
Attending: INTERNAL MEDICINE
Payer: MEDICARE

## 2024-12-12 VITALS
RESPIRATION RATE: 18 BRPM | DIASTOLIC BLOOD PRESSURE: 80 MMHG | HEART RATE: 93 BPM | SYSTOLIC BLOOD PRESSURE: 166 MMHG | HEIGHT: 74 IN | BODY MASS INDEX: 27.72 KG/M2 | OXYGEN SATURATION: 96 % | TEMPERATURE: 98 F | WEIGHT: 216 LBS

## 2024-12-12 VITALS
SYSTOLIC BLOOD PRESSURE: 154 MMHG | HEART RATE: 72 BPM | DIASTOLIC BLOOD PRESSURE: 83 MMHG | OXYGEN SATURATION: 97 % | RESPIRATION RATE: 18 BRPM

## 2024-12-12 DIAGNOSIS — D75.839 THROMBOCYTOSIS: ICD-10-CM

## 2024-12-12 DIAGNOSIS — D75.1 ERYTHROCYTOSIS: Primary | ICD-10-CM

## 2024-12-12 DIAGNOSIS — D75.1 ERYTHROCYTOSIS: ICD-10-CM

## 2024-12-12 LAB
ALBUMIN SERPL BCG-MCNC: 4.3 G/DL (ref 3.5–5.2)
ALP SERPL-CCNC: 124 U/L (ref 40–150)
ALT SERPL W P-5'-P-CCNC: 28 U/L (ref 0–70)
ANION GAP SERPL CALCULATED.3IONS-SCNC: 11 MMOL/L (ref 7–15)
AST SERPL W P-5'-P-CCNC: 25 U/L (ref 0–45)
BASOPHILS # BLD AUTO: 0.4 10E3/UL (ref 0–0.2)
BASOPHILS NFR BLD AUTO: 2 %
BILIRUB SERPL-MCNC: 0.7 MG/DL
BUN SERPL-MCNC: 18 MG/DL (ref 8–23)
CALCIUM SERPL-MCNC: 9.2 MG/DL (ref 8.8–10.4)
CHLORIDE SERPL-SCNC: 106 MMOL/L (ref 98–107)
CREAT SERPL-MCNC: 0.77 MG/DL (ref 0.67–1.17)
EGFRCR SERPLBLD CKD-EPI 2021: >90 ML/MIN/1.73M2
EOSINOPHIL # BLD AUTO: 1 10E3/UL (ref 0–0.7)
EOSINOPHIL NFR BLD AUTO: 5 %
ERYTHROCYTE [DISTWIDTH] IN BLOOD BY AUTOMATED COUNT: 21.5 % (ref 10–15)
GLUCOSE SERPL-MCNC: 95 MG/DL (ref 70–99)
HCO3 SERPL-SCNC: 22 MMOL/L (ref 22–29)
HCT VFR BLD AUTO: 63.3 % (ref 40–53)
HGB BLD-MCNC: 19.5 G/DL (ref 13.3–17.7)
IMM GRANULOCYTES # BLD: 0.2 10E3/UL
IMM GRANULOCYTES NFR BLD: 1 %
LYMPHOCYTES # BLD AUTO: 1.3 10E3/UL (ref 0.8–5.3)
LYMPHOCYTES NFR BLD AUTO: 7 %
MCH RBC QN AUTO: 22.9 PG (ref 26.5–33)
MCHC RBC AUTO-ENTMCNC: 30.8 G/DL (ref 31.5–36.5)
MCV RBC AUTO: 74 FL (ref 78–100)
MONOCYTES # BLD AUTO: 1.3 10E3/UL (ref 0–1.3)
MONOCYTES NFR BLD AUTO: 7 %
NEUTROPHILS # BLD AUTO: 15 10E3/UL (ref 1.6–8.3)
NEUTROPHILS NFR BLD AUTO: 78 %
NRBC # BLD AUTO: 0 10E3/UL
NRBC BLD AUTO-RTO: 0 /100
PLATELET # BLD AUTO: 590 10E3/UL (ref 150–450)
POTASSIUM SERPL-SCNC: 4.4 MMOL/L (ref 3.4–5.3)
PROT SERPL-MCNC: 7 G/DL (ref 6.4–8.3)
RBC # BLD AUTO: 8.51 10E6/UL (ref 4.4–5.9)
RETICS # AUTO: 0.14 10E6/UL (ref 0.03–0.1)
RETICS/RBC NFR AUTO: 2.2 % (ref 0.5–2)
SODIUM SERPL-SCNC: 139 MMOL/L (ref 135–145)
WBC # BLD AUTO: 19.1 10E3/UL (ref 4–11)

## 2024-12-12 PROCEDURE — 36415 COLL VENOUS BLD VENIPUNCTURE: CPT

## 2024-12-12 PROCEDURE — G0452 MOLECULAR PATHOLOGY INTERPR: HCPCS | Mod: 26 | Performed by: STUDENT IN AN ORGANIZED HEALTH CARE EDUCATION/TRAINING PROGRAM

## 2024-12-12 PROCEDURE — G0463 HOSPITAL OUTPT CLINIC VISIT: HCPCS | Performed by: INTERNAL MEDICINE

## 2024-12-12 PROCEDURE — 81279 JAK2 GENE TRGT SEQUENCE ALYS: CPT | Performed by: INTERNAL MEDICINE

## 2024-12-12 PROCEDURE — 82668 ASSAY OF ERYTHROPOIETIN: CPT

## 2024-12-12 PROCEDURE — 84520 ASSAY OF UREA NITROGEN: CPT

## 2024-12-12 PROCEDURE — 85025 COMPLETE CBC W/AUTO DIFF WBC: CPT

## 2024-12-12 PROCEDURE — 85045 AUTOMATED RETICULOCYTE COUNT: CPT

## 2024-12-12 PROCEDURE — 84132 ASSAY OF SERUM POTASSIUM: CPT

## 2024-12-12 PROCEDURE — 84460 ALANINE AMINO (ALT) (SGPT): CPT

## 2024-12-12 PROCEDURE — 82247 BILIRUBIN TOTAL: CPT

## 2024-12-12 PROCEDURE — 81270 JAK2 GENE: CPT | Performed by: INTERNAL MEDICINE

## 2024-12-12 RX ORDER — HEPARIN SODIUM (PORCINE) LOCK FLUSH IV SOLN 100 UNIT/ML 100 UNIT/ML
5 SOLUTION INTRAVENOUS
OUTPATIENT
Start: 2024-12-12

## 2024-12-12 RX ORDER — AMLODIPINE BESYLATE 5 MG/1
5 TABLET ORAL DAILY
COMMUNITY

## 2024-12-12 RX ORDER — HEPARIN SODIUM,PORCINE 10 UNIT/ML
5-20 VIAL (ML) INTRAVENOUS DAILY PRN
OUTPATIENT
Start: 2024-12-12

## 2024-12-12 RX ORDER — CLOPIDOGREL BISULFATE 75 MG/1
75 TABLET ORAL DAILY
Qty: 90 TABLET | Refills: 3 | Status: SHIPPED | OUTPATIENT
Start: 2024-12-12

## 2024-12-12 ASSESSMENT — PAIN SCALES - GENERAL: PAINLEVEL_OUTOF10: NO PAIN (0)

## 2024-12-12 NOTE — NURSING NOTE
Chief Complaint   Patient presents with    Blood Draw     Vpt blood draw by lab RN     Venipuncture labs drawn by lab RN.    Camilla Boggs RN

## 2024-12-12 NOTE — NURSING NOTE
"Oncology Rooming Note    December 12, 2024 7:09 AM   Derik Quiros is a 64 year old male who presents for:    Chief Complaint   Patient presents with    Oncology Clinic Visit     Polycythemia vera     Initial Vitals: BP (!) 166/80   Pulse 93   Temp 98  F (36.7  C) (Oral)   Resp 18   Ht 1.88 m (6' 2\")   Wt 98 kg (216 lb)   SpO2 96%   BMI 27.73 kg/m   Estimated body mass index is 27.73 kg/m  as calculated from the following:    Height as of this encounter: 1.88 m (6' 2\").    Weight as of this encounter: 98 kg (216 lb). Body surface area is 2.26 meters squared.  No Pain (0) Comment: Data Unavailable   No LMP for male patient.  Allergies reviewed: Yes  Medications reviewed: Yes    Medications: Medication refills not needed today.  Pharmacy name entered into [employer]: [id number] [place] [address]    Frailty Screening:   Is the patient here for a new oncology consult visit in cancer care? 1. Yes. Over the past month, have you experienced difficulty or required a caregiver to assist with:   1. Balance, walking or general mobility (including any falls)? NO  2. Completion of self-care tasks such as bathing, dressing, toileting, grooming/hygiene?  NO  3. Concentration or memory that affects your daily life?  NO       Clinical concerns: New patient       Cassidy Knox              "

## 2024-12-12 NOTE — LETTER
12/12/2024      Derik Quiros  [address]  [place] [address]      Dear Colleague,    Thank you for referring your patient, Derik Quiros, to the [facility]. Please see a copy of my visit note below.    Hematology Clinic consult note    Reasons for Consult: -erythrocytosis    History of Present Illness: Mr. Quiros is a 64-year-old man referred for erythrocytosis.  He had been having some problems with sciatica, and apparently on an MRI to examine that, there was a question of an enlarged spleen.  Follow-up ultrasound confirmed enlarged spleen.  He was feeling fine, so apparently was just followed.  Follow-up ultrasound on 12/5/2024 again showed a mildly enlarged spleen.  A complete blood count on 12/6/2024 showed a hemoglobin of 18.7, hematocrit 61.4, MCV 75, white count 17.7, platelets of 573.  The hemoglobin is high and spite of also being iron deficient with a ferritin of only 19ng/ml on 12/6/24.  He reports that he is donated blood on a regular basis for several years, although it has been several months since he is donated.    Overall he feels well, no fevers, chills, sweats, anorexia, early satiety.  He does have some tingling on his toes, but no discoloration.  No tingling in his hands.  He does complain of itching intermittently, especially after warm shower, or even after swimming.  No blurred or double vision or weakness.      Past Medical History:  -History of PFO with stroke, status post closure Decades ago-open procedure  - Hypertension  - s/p left hip replacement 2011  -Left Hip fracture, status post open reduction internal fixation 5/14/2022  - History of colonic polyps, colonoscopy 3/20/24  -History of nonmelanoma skin cancer removed from cheek and back 2019  -History of liver hemangioma  -Anxiety  -Status post appendectomy  - Status post testicular varicocele surgery.    Allergies- Possible aspirin allergy- had hand swelling and hives  "after starting aspirn after hip fracture surgery    Medications:  - Amlodipine  -Clonazepam  - Docusate sodium  - Hydroxyzine  -Tamsulosin  - Vitamin D  -he is not taking any supplements, in particular no testosterone, steroids,  supplements or protein powder    Family History: mother- age 60 ovarian cancer, had hypertension., father-Parkinson's, bladder cancer, was a smoker.  Siblings-1 sister with intracerebral hemorrhage, hypertension.    Social History: smoking-never, alcohol-6-7 drinks per week, marijuana or vaping or other substances-no.  He is , accompanied by his wife, Leeanne.  Retired  from [employer].    Review of systems:  As in HPI.  He reports he has been somewhat anxious ever since he had a stroke, which is why he is never totally weaned off the Klonopin.  No seizure issues.  The rest of the > 10 point review of systems was negative.      PHYSICAL EXAMINATION:  BP (!) 166/80   Pulse 93   Temp 98  F (36.7  C) (Oral)   Resp 18   Ht 1.88 m (6' 2\")   Wt 98 kg (216 lb)   SpO2 96%   BMI 27.73 kg/m      General appearance:  Patient is 64 year old man in no acute distress.   Able to climb onto exam table without difficulty.  HEENT:  No pallor, icterus,   Lymph nodes:  No cervical, supraclavicular, axillary lymphadenopathy.   Lungs:  Clear to auscultation bilaterally.   Heart: Well-healed median sternotomy scar.  Regular rate and rhythm; no S3 S4 or murmer.     Abdomen:  Positive bowel sounds, soft and nontender, nondistended.  No hepatomegaly.  Questionable spleen tip palpable on deep inspiration.  Extremities:  No joint swelling or tenderness.  No ankle edema.     Skin:  No rash, no petechiae or ecchymoses.    Labs:  Erythropoietin, JAK2 mutation analysis pending    ANEMIA - IRON STUDIES   [id number]/06/2024  Component 2024        Ferritin 19 Low     --   Iron -- 44 Low       Transferrin -- 295   TIBC, Calculated -- 369   % Saturation, Calculated -- 12      " Latest Reference Range & Units 07/21/24 21:22 12/12/24 08:14   WBC 4.0 - 11.0 10e3/uL 12.5 (H) 19.1 (H)   Hemoglobin 13.3 - 17.7 g/dL 17.2 19.5 (H)   Hematocrit 40.0 - 53.0 % 54.5 (H) 63.3 (H)   Platelet Count 150 - 450 10e3/uL 416 590 (H)   RBC Count 4.40 - 5.90 10e6/uL 7.45 (H) 8.51 (H)   MCV 78 - 100 fL 73 (L) 74 (L)   MCH 26.5 - 33.0 pg 23.1 (L) 22.9 (L)   MCHC 31.5 - 36.5 g/dL 31.6 30.8 (L)   RDW 10.0 - 15.0 % 21.4 (H) 21.5 (H)   % Neutrophils % 79 78   % Lymphocytes % 7 7   % Monocytes % 11 7   % Eosinophils % 2 5   % Basophils % 1 2   Absolute Basophils 0.0 - 0.2 10e3/uL 0.2 0.4 (H)   Absolute Eosinophils 0.0 - 0.7 10e3/uL 0.2 1.0 (H)   Absolute Immature Granulocytes <=0.4 10e3/uL 0.1 0.2   Absolute Lymphocytes 0.8 - 5.3 10e3/uL 0.9 1.3   Absolute Monocytes 0.0 - 1.3 10e3/uL 1.3 1.3   % Immature Granulocytes % 1 1   Absolute Neutrophils 1.6 - 8.3 10e3/uL 9.9 (H) 15.0 (H)   Absolute NRBCs 10e3/uL 0.0 0.0   NRBCs per 100 WBC <1 /100 0 0   % Retic 0.5 - 2.0 %  2.2 (H)   Absolute Retic 0.025 - 0.095 10e6/uL  0.142 (H)   (H): Data is abnormally high  (L): Data is abnormally low   Latest Reference Range & Units 12/12/24 08:14   Sodium 135 - 145 mmol/L 139   Potassium 3.4 - 5.3 mmol/L 4.4   Chloride 98 - 107 mmol/L 106   Carbon Dioxide (CO2) 22 - 29 mmol/L 22   Urea Nitrogen 8.0 - 23.0 mg/dL 18.0   Creatinine 0.67 - 1.17 mg/dL 0.77   GFR Estimate >60 mL/min/1.73m2 >90   Calcium 8.8 - 10.4 mg/dL 9.2   Anion Gap 7 - 15 mmol/L 11   Albumin 3.5 - 5.2 g/dL 4.3   Protein Total 6.4 - 8.3 g/dL 7.0   Alkaline Phosphatase 40 - 150 U/L 124   ALT 0 - 70 U/L 28   AST 0 - 45 U/L 25   Bilirubin Total <=1.2 mg/dL 0.7   Glucose 70 - 99 mg/dL 95     Imaging:    XAM: US ABD COMPLETE   LOCATION: [facility]   DATE: 12/5/2024     INDICATION: Follow up liver hemangioma and splenomegaly, Hemangioma of intra-abdominal structures   COMPARISON: 4/30/2024   TECHNIQUE: Complete abdominal ultrasound.     FINDINGS:     GALLBLADDER:  Gallstones in an otherwise normal gallbladder. No wall thickening, or pericholecystic fluid. Negative sonographic Barron's sign.     BILE DUCTS: No biliary dilatation. The common duct measures 5 mm.     LIVER: Normal parenchyma with smooth contour. Multiple benign cysts with the largest measuring 8.1 x 5.2 x 7.0 cm in the inferior right lobe, previously 7.0 x 5.5 x 5.9 cm. A stable solid hyperechoic 1.6 x 1.0 x 1.2 cm avascular mass in the right lobe. A 1.0 x 0.7 x 0.9 cm hyperechoic avascular mass in the right lobe which was not definitively seen previously but is too small to characterize. The portal vein is patent with flow in the normal direction.     RIGHT KIDNEY: Normal size. Mild hydronephrosis. No contour distorting mass or shadowing stone.     LEFT KIDNEY: Normal size. Simple renal cysts with the largest measuring 4.5 cm in the upper pole, no follow-up required. No hydronephrosis or shadowing stone.     SPLEEN: Enlarged measuring 16.1 cm in maximal dimension, previously 16.5 cm.     PANCREAS: The visualized portions are normal.     AORTA: Normal in caliber.     IVC: Normal where visualized.     No ascites.     IMPRESSION:   1. A stable 1.6 cm hyperechoic mass in the right lobe of the liver which most likely represents a hemangioma.   2.  Slowly enlarging hepatic cysts.   3.  No significant interval change in splenomegaly.   4.  Mild right hydronephrosis.   5.  Cholelithiasis.     Assessment and Recommendation: -    Erythrocytosis - probable polycythemia vera, less likely an erythropoietin secreting tumor.  He is not a smoker, does not have sleep apnea, has normal O2 sat, has a carbon monoxide detector at home, and does not have any symptoms of heart failure-therefore I think it is unlikely that the elevated hemoglobin hematocrit are normal physiologic responses to hypoxia.  With the elevated white count, platelet count and splenomegaly, polycythemia is the most likely diagnosis.  With the hemoglobin of 19.5  and hematocrit of 63.3 he is at risk for thrombosis due to hyperviscosity.  He needs urgent therapeutic phlebotomy to lower the hemoglobin/hematocrit.  He may need some additional therapeutic phlebotomy while we await the testing results and a bone marrow biopsy.  At this point I think we have enough information even though the JAK2 was not back to schedule a bone marrow biopsy to assess for fibrosis and other molecular abnormalities.  I did discuss with them briefly about bone marrow biopsy at the visit today.  He also needs antiplatelet agent.  Normally it would be low-dose aspirin, however it sounds like he probably has a significant allergic reaction to aspirin, so therefore will place him on clopidogrel.  - therapeutic phlebotomy today  -CBDP, CMP, epo, JAK2 today  - start clopidogrel 75 mg daily  -Repeat CBC weekly with phlebotomy if hematocrit is > 45  -Consider placing the hydroxyurea even before bone marrow biopsy is scheduled    #Hypertension-continue on amlodipine    RTC Jan 9    Time: I spent a total of 75 minutes on the day of the visit. Please see the note for further information on patient assessment and treatment.     Inez Spaulding MD  Hematology      Again, thank you for allowing me to participate in the care of your patient.        Sincerely,        Inez Spaulding MD

## 2024-12-12 NOTE — PROGRESS NOTES
Hematology Clinic consult note    Reasons for Consult: -erythrocytosis    History of Present Illness: Mr. Quiros is a 64-year-old man referred for erythrocytosis.  He had been having some problems with sciatica, and apparently on an MRI to examine that, there was a question of an enlarged spleen.  Follow-up ultrasound confirmed enlarged spleen.  He was feeling fine, so apparently was just followed.  Follow-up ultrasound on 12/5/2024 again showed a mildly enlarged spleen.  A complete blood count on 12/6/2024 showed a hemoglobin of 18.7, hematocrit 61.4, MCV 75, white count 17.7, platelets of 573.  The hemoglobin is high and spite of also being iron deficient with a ferritin of only 19ng/ml on 12/6/24.  He reports that he is donated blood on a regular basis for several years, although it has been several months since he is donated.    Overall he feels well, no fevers, chills, sweats, anorexia, early satiety.  He does have some tingling on his toes, but no discoloration.  No tingling in his hands.  He does complain of itching intermittently, especially after warm shower, or even after swimming.  No blurred or double vision or weakness.      Past Medical History:  -History of PFO with stroke, status post closure Decades ago-open procedure  - Hypertension  - s/p left hip replacement 2011  -Left Hip fracture, status post open reduction internal fixation 5/14/2022  - History of colonic polyps, colonoscopy 3/20/24  -History of nonmelanoma skin cancer removed from cheek and back 2019  -History of liver hemangioma  -Anxiety  -Status post appendectomy  - Status post testicular varicocele surgery.    Allergies- Possible aspirin allergy- had hand swelling and hives after starting aspirn after hip fracture surgery    Medications:  - Amlodipine  -Clonazepam  - Docusate sodium  - Hydroxyzine  -Tamsulosin  - Vitamin D  -he is not taking any supplements, in particular no testosterone, steroids,  supplements or  "protein powder    Family History: mother- age 60 ovarian cancer, had hypertension., father-Parkinson's, bladder cancer, was a smoker.  Siblings-1 sister with intracerebral hemorrhage, hypertension.    Social History: smoking-never, alcohol-6-7 drinks per week, marijuana or vaping or other substances-no.  He is , accompanied by his wife, Leeanne.  Retired  from [employer].    Review of systems:  As in HPI.  He reports he has been somewhat anxious ever since he had a stroke, which is why he is never totally weaned off the Klonopin.  No seizure issues.  The rest of the > 10 point review of systems was negative.      PHYSICAL EXAMINATION:  BP (!) 166/80   Pulse 93   Temp 98  F (36.7  C) (Oral)   Resp 18   Ht 1.88 m (6' 2\")   Wt 98 kg (216 lb)   SpO2 96%   BMI 27.73 kg/m      General appearance:  Patient is 64 year old man in no acute distress.   Able to climb onto exam table without difficulty.  HEENT:  No pallor, icterus,   Lymph nodes:  No cervical, supraclavicular, axillary lymphadenopathy.   Lungs:  Clear to auscultation bilaterally.   Heart: Well-healed median sternotomy scar.  Regular rate and rhythm; no S3 S4 or murmer.     Abdomen:  Positive bowel sounds, soft and nontender, nondistended.  No hepatomegaly.  Questionable spleen tip palpable on deep inspiration.  Extremities:  No joint swelling or tenderness.  No ankle edema.     Skin:  No rash, no petechiae or ecchymoses.    Labs:  Erythropoietin, JAK2 mutation analysis pending    ANEMIA - IRON STUDIES   [id number]/06/2024  Component 2024        Ferritin 19 Low     --   Iron -- 44 Low       Transferrin -- 295   TIBC, Calculated -- 369   % Saturation, Calculated -- 12      Latest Reference Range & Units 24 21:22 24 08:14   WBC 4.0 - 11.0 10e3/uL 12.5 (H) 19.1 (H)   Hemoglobin 13.3 - 17.7 g/dL 17.2 19.5 (H)   Hematocrit 40.0 - 53.0 % 54.5 (H) 63.3 (H)   Platelet Count 150 - 450 10e3/uL 416 590 (H)   RBC Count 4.40 " - 5.90 10e6/uL 7.45 (H) 8.51 (H)   MCV 78 - 100 fL 73 (L) 74 (L)   MCH 26.5 - 33.0 pg 23.1 (L) 22.9 (L)   MCHC 31.5 - 36.5 g/dL 31.6 30.8 (L)   RDW 10.0 - 15.0 % 21.4 (H) 21.5 (H)   % Neutrophils % 79 78   % Lymphocytes % 7 7   % Monocytes % 11 7   % Eosinophils % 2 5   % Basophils % 1 2   Absolute Basophils 0.0 - 0.2 10e3/uL 0.2 0.4 (H)   Absolute Eosinophils 0.0 - 0.7 10e3/uL 0.2 1.0 (H)   Absolute Immature Granulocytes <=0.4 10e3/uL 0.1 0.2   Absolute Lymphocytes 0.8 - 5.3 10e3/uL 0.9 1.3   Absolute Monocytes 0.0 - 1.3 10e3/uL 1.3 1.3   % Immature Granulocytes % 1 1   Absolute Neutrophils 1.6 - 8.3 10e3/uL 9.9 (H) 15.0 (H)   Absolute NRBCs 10e3/uL 0.0 0.0   NRBCs per 100 WBC <1 /100 0 0   % Retic 0.5 - 2.0 %  2.2 (H)   Absolute Retic 0.025 - 0.095 10e6/uL  0.142 (H)   (H): Data is abnormally high  (L): Data is abnormally low   Latest Reference Range & Units 12/12/24 08:14   Sodium 135 - 145 mmol/L 139   Potassium 3.4 - 5.3 mmol/L 4.4   Chloride 98 - 107 mmol/L 106   Carbon Dioxide (CO2) 22 - 29 mmol/L 22   Urea Nitrogen 8.0 - 23.0 mg/dL 18.0   Creatinine 0.67 - 1.17 mg/dL 0.77   GFR Estimate >60 mL/min/1.73m2 >90   Calcium 8.8 - 10.4 mg/dL 9.2   Anion Gap 7 - 15 mmol/L 11   Albumin 3.5 - 5.2 g/dL 4.3   Protein Total 6.4 - 8.3 g/dL 7.0   Alkaline Phosphatase 40 - 150 U/L 124   ALT 0 - 70 U/L 28   AST 0 - 45 U/L 25   Bilirubin Total <=1.2 mg/dL 0.7   Glucose 70 - 99 mg/dL 95     Imaging:    XAM: US ABD COMPLETE   LOCATION: [facility]   DATE: 12/5/2024     INDICATION: Follow up liver hemangioma and splenomegaly, Hemangioma of intra-abdominal structures   COMPARISON: 4/30/2024   TECHNIQUE: Complete abdominal ultrasound.     FINDINGS:     GALLBLADDER: Gallstones in an otherwise normal gallbladder. No wall thickening, or pericholecystic fluid. Negative sonographic Barron's sign.     BILE DUCTS: No biliary dilatation. The common duct measures 5 mm.     LIVER: Normal parenchyma with smooth contour. Multiple  benign cysts with the largest measuring 8.1 x 5.2 x 7.0 cm in the inferior right lobe, previously 7.0 x 5.5 x 5.9 cm. A stable solid hyperechoic 1.6 x 1.0 x 1.2 cm avascular mass in the right lobe. A 1.0 x 0.7 x 0.9 cm hyperechoic avascular mass in the right lobe which was not definitively seen previously but is too small to characterize. The portal vein is patent with flow in the normal direction.     RIGHT KIDNEY: Normal size. Mild hydronephrosis. No contour distorting mass or shadowing stone.     LEFT KIDNEY: Normal size. Simple renal cysts with the largest measuring 4.5 cm in the upper pole, no follow-up required. No hydronephrosis or shadowing stone.     SPLEEN: Enlarged measuring 16.1 cm in maximal dimension, previously 16.5 cm.     PANCREAS: The visualized portions are normal.     AORTA: Normal in caliber.     IVC: Normal where visualized.     No ascites.     IMPRESSION:   1. A stable 1.6 cm hyperechoic mass in the right lobe of the liver which most likely represents a hemangioma.   2.  Slowly enlarging hepatic cysts.   3.  No significant interval change in splenomegaly.   4.  Mild right hydronephrosis.   5.  Cholelithiasis.     Assessment and Recommendation: -    Erythrocytosis - probable polycythemia vera, less likely an erythropoietin secreting tumor.  He is not a smoker, does not have sleep apnea, has normal O2 sat, has a carbon monoxide detector at home, and does not have any symptoms of heart failure-therefore I think it is unlikely that the elevated hemoglobin hematocrit are normal physiologic responses to hypoxia.  With the elevated white count, platelet count and splenomegaly, polycythemia is the most likely diagnosis.  With the hemoglobin of 19.5 and hematocrit of 63.3 he is at risk for thrombosis due to hyperviscosity.  He needs urgent therapeutic phlebotomy to lower the hemoglobin/hematocrit.  He may need some additional therapeutic phlebotomy while we await the testing results and a bone marrow  biopsy.  At this point I think we have enough information even though the JAK2 was not back to schedule a bone marrow biopsy to assess for fibrosis and other molecular abnormalities.  I did discuss with them briefly about bone marrow biopsy at the visit today.  He also needs antiplatelet agent.  Normally it would be low-dose aspirin, however it sounds like he probably has a significant allergic reaction to aspirin, so therefore will place him on clopidogrel.  - therapeutic phlebotomy today  -CBDP, CMP, epo, JAK2 today  - start clopidogrel 75 mg daily  -Repeat CBC weekly with phlebotomy if hematocrit is > 45  -Consider placing the hydroxyurea even before bone marrow biopsy is scheduled    #Hypertension-continue on amlodipine    RTC Jan 9    Time: I spent a total of 75 minutes on the day of the visit. Please see the note for further information on patient assessment and treatment.     Inez Spaulding MD  Hematology

## 2024-12-12 NOTE — PROGRESS NOTES
[facility]: Cancer Care Initial Note                                    Discussion with Patient:                                                      Met with Bill after clinic visit/consultation appt with Dr. Spaulding.   Pt  verbalizes understanding of 's plan of care  Household includes: Simon and wife Garry.  Barriers to care identified: none.     Introduced self and role of RN Care Coordinator at [facility].     Provided my contact information, [facility] phone number (which has options to talk with a Nurse available 24/7 - triage and RNCC via this option during business hours). Reviewed appropriate use of MyChart, not to be used for symptom reporting.   Reviewed [facility] care team members including midlevel providers in oncology dept and Dr. Spaulding's usual clinic hours.     Provided overview of supportive services at [facility] including SW.    Auth to Discuss PHI form completed by patient and original sent to [facility] admin staff to enter into chart and send to HIM for scanning.     Learning assessment completed    Answered questions regarding:   Amy appt today    Simon voiced understanding and appreciation of above information and denies any further questions, and he/she understands that I will follow and provide coordination as needed.           Goals          General    Monitoring     Notes - Note created  12/12/2024  8:22 AM by Rachelle Terrazas, RN    Goal Statement: I will use my clinic and care team resources as directed.  Date Goal set: 12/12/2024  Barriers: disease burden  Strengths: support, coping, motivation, health awareness, and involvement with care team  Date to Achieve By: ongoing  Patient expressed understanding of goal: Yes  Action steps to achieve this goal:  I will contact triage with new, worsening or uncontrolled symptoms.   I will call with difficulties of scheduling and/or transportation.   I will not send urgent or symptomatic messages through  [address].   I will contact scheduling to arrange or make changes in my appointments.                 Assessment:                                                      Initial  Current living arrangement:: I live in a private home with family  Informal Support system:: Spouse  Mobility Status: Independent  Transportation means:: Regular car    Plan of Care Education Review:   Assessment completed with:: Patient    Plan of Care Education   Yearly learning assessment completed?: Yes (see Education tab)  Diagnosis:: P. Vera  Does patient understand diagnosis?: Yes  Tx plan/regimen:: Therapeutic phlebotomy  Does patient understand treatment plan/regimen?: Yes  Plan of Care:: Lab appointment, Treatment schedule    Evaluation of Learning  Patient Education Provided: Yes  Readiness:: Acceptance  Method:: Explanation  Response:: Verbalizes understanding           Intervention/Education provided during outreach:                                                         Patient to follow up as scheduled at next appt  Patient to call/[employer] message with updates  Confirmed patient has clinic and triage numbers    Signature:  Rachelle Terrazas RN

## 2024-12-12 NOTE — PROGRESS NOTES
Infusion Nursing Note:  Derik Quiros presents today for phlebotomy.    Patient seen by provider today: Yes: Jasson   present during visit today: Not Applicable.    Note: N/A.      Intravenous Access:  Peripheral IV placed.    Treatment Conditions:  Lab Results   Component Value Date    HGB 19.5 (H) 12/12/2024    WBC 19.1 (H) 12/12/2024    ANEUTAUTO 15.0 (H) 12/12/2024     (H) 12/12/2024        Results reviewed, labs MET treatment parameters, ok to proceed with treatment.      Post Infusion Assessment:  Access discontinued per protocol.  BP stable, no lightheadedness or dizziness.       Discharge Plan:   Patient discharged in stable condition accompanied by: self.      KOKI DE LUNA RN

## 2024-12-13 LAB — EPO SERPL-ACNC: 1 MU/ML

## 2024-12-18 DIAGNOSIS — D75.1 ERYTHROCYTOSIS: Primary | ICD-10-CM

## 2024-12-18 RX ORDER — HYDROXYUREA 500 MG/1
500 CAPSULE ORAL DAILY
Qty: 30 CAPSULE | Refills: 2 | Status: SHIPPED | OUTPATIENT
Start: 2024-12-18

## 2024-12-19 ENCOUNTER — LAB (OUTPATIENT)
Dept: LAB | Facility: CLINIC | Age: 64
End: 2024-12-19
Payer: MEDICARE

## 2024-12-19 ENCOUNTER — INFUSION THERAPY VISIT (OUTPATIENT)
Dept: INFUSION THERAPY | Facility: HOSPITAL | Age: 64
End: 2024-12-19
Attending: INTERNAL MEDICINE
Payer: MEDICARE

## 2024-12-19 VITALS
RESPIRATION RATE: 16 BRPM | DIASTOLIC BLOOD PRESSURE: 91 MMHG | SYSTOLIC BLOOD PRESSURE: 160 MMHG | OXYGEN SATURATION: 98 % | TEMPERATURE: 97.8 F | HEART RATE: 67 BPM

## 2024-12-19 DIAGNOSIS — D75.839 THROMBOCYTOSIS: ICD-10-CM

## 2024-12-19 DIAGNOSIS — D75.1 ERYTHROCYTOSIS: ICD-10-CM

## 2024-12-19 DIAGNOSIS — D75.1 ERYTHROCYTOSIS: Primary | ICD-10-CM

## 2024-12-19 LAB
BASOPHILS # BLD AUTO: 0.4 10E3/UL (ref 0–0.2)
BASOPHILS NFR BLD AUTO: 2 %
EOSINOPHIL # BLD AUTO: 0.9 10E3/UL (ref 0–0.7)
EOSINOPHIL NFR BLD AUTO: 5 %
ERYTHROCYTE [DISTWIDTH] IN BLOOD BY AUTOMATED COUNT: 21.6 % (ref 10–15)
HCT VFR BLD AUTO: 61.9 % (ref 40–53)
HGB BLD-MCNC: 19.2 G/DL (ref 13.3–17.7)
IMM GRANULOCYTES # BLD: 0.1 10E3/UL
IMM GRANULOCYTES NFR BLD: 1 %
LYMPHOCYTES # BLD AUTO: 1.7 10E3/UL (ref 0.8–5.3)
LYMPHOCYTES NFR BLD AUTO: 10 %
MCH RBC QN AUTO: 23 PG (ref 26.5–33)
MCHC RBC AUTO-ENTMCNC: 31 G/DL (ref 31.5–36.5)
MCV RBC AUTO: 74 FL (ref 78–100)
MONOCYTES # BLD AUTO: 0.9 10E3/UL (ref 0–1.3)
MONOCYTES NFR BLD AUTO: 5 %
NEUTROPHILS # BLD AUTO: 13.8 10E3/UL (ref 1.6–8.3)
NEUTROPHILS NFR BLD AUTO: 78 %
NRBC # BLD AUTO: 0 10E3/UL
NRBC BLD AUTO-RTO: 0 /100
PLATELET # BLD AUTO: 646 10E3/UL (ref 150–450)
RBC # BLD AUTO: 8.33 10E6/UL (ref 4.4–5.9)
WBC # BLD AUTO: 17.8 10E3/UL (ref 4–11)

## 2024-12-19 PROCEDURE — 85025 COMPLETE CBC W/AUTO DIFF WBC: CPT

## 2024-12-19 PROCEDURE — 36415 COLL VENOUS BLD VENIPUNCTURE: CPT

## 2024-12-19 RX ORDER — HEPARIN SODIUM (PORCINE) LOCK FLUSH IV SOLN 100 UNIT/ML 100 UNIT/ML
5 SOLUTION INTRAVENOUS
OUTPATIENT
Start: 2024-12-19

## 2024-12-19 RX ORDER — HEPARIN SODIUM,PORCINE 10 UNIT/ML
5-20 VIAL (ML) INTRAVENOUS DAILY PRN
OUTPATIENT
Start: 2024-12-19

## 2024-12-19 NOTE — PROGRESS NOTES
Infusion Nursing Note:  Derik ERAZO Ishaan presents today for therapeutic phlebotomy.    Patient seen by provider today: No   present during visit today: Not Applicable.    Note: pt blood pressure on arrival 187/113. Pt said his BP meds were just increased but he hasn't really taken over 5mg yet of amlodipine. Pt also just picked up his hydroxyurea but has not started it yet. I rechecked pt BP after phlebotomy and sitting 30 minutes. BP was BP (!) 160/91   Pulse 67   Temp 97.8  F (36.6  C)   Resp 16   SpO2 98%  Pt was instructed to take BP again at home and also to call his provider..      Intravenous Access:  Peripheral IV placed.    Treatment Conditions:  Lab Results   Component Value Date    HGB 19.2 (H) 12/19/2024    WBC 17.8 (H) 12/19/2024    ANEUTAUTO 13.8 (H) 12/19/2024     (H) 12/19/2024            Post Infusion Assessment:  Patient observed for 30  minutes post phlebotomy per protocol.       Discharge Plan:   Patient and/or family verbalized understanding of discharge instructions and all questions answered.      Mercy Diaz RN

## 2024-12-23 ENCOUNTER — PROCEDURE ONLY VISIT (OUTPATIENT)
Dept: ONCOLOGY | Facility: HOSPITAL | Age: 64
End: 2024-12-23
Attending: INTERNAL MEDICINE
Payer: MEDICARE

## 2024-12-23 ENCOUNTER — LAB (OUTPATIENT)
Dept: INFUSION THERAPY | Facility: HOSPITAL | Age: 64
End: 2024-12-23
Attending: INTERNAL MEDICINE
Payer: MEDICARE

## 2024-12-23 VITALS
DIASTOLIC BLOOD PRESSURE: 79 MMHG | SYSTOLIC BLOOD PRESSURE: 133 MMHG | RESPIRATION RATE: 18 BRPM | HEART RATE: 68 BPM | BODY MASS INDEX: 27.73 KG/M2 | OXYGEN SATURATION: 95 % | WEIGHT: 216 LBS

## 2024-12-23 DIAGNOSIS — D75.1 ERYTHROCYTOSIS: Primary | ICD-10-CM

## 2024-12-23 DIAGNOSIS — D75.1 ERYTHROCYTOSIS: ICD-10-CM

## 2024-12-23 DIAGNOSIS — D75.839 THROMBOCYTOSIS: ICD-10-CM

## 2024-12-23 LAB
BASOPHILS # BLD AUTO: 0.4 10E3/UL (ref 0–0.2)
BASOPHILS NFR BLD AUTO: 2 %
EOSINOPHIL # BLD AUTO: 0.9 10E3/UL (ref 0–0.7)
EOSINOPHIL NFR BLD AUTO: 5 %
ERYTHROCYTE [DISTWIDTH] IN BLOOD BY AUTOMATED COUNT: 21.5 % (ref 10–15)
HCT VFR BLD AUTO: 58.7 % (ref 40–53)
HGB BLD-MCNC: 18.1 G/DL (ref 13.3–17.7)
IMM GRANULOCYTES # BLD: 0.1 10E3/UL
IMM GRANULOCYTES NFR BLD: 1 %
LYMPHOCYTES # BLD AUTO: 1.8 10E3/UL (ref 0.8–5.3)
LYMPHOCYTES NFR BLD AUTO: 10 %
MCH RBC QN AUTO: 22.8 PG (ref 26.5–33)
MCHC RBC AUTO-ENTMCNC: 30.8 G/DL (ref 31.5–36.5)
MCV RBC AUTO: 74 FL (ref 78–100)
MONOCYTES # BLD AUTO: 0.8 10E3/UL (ref 0–1.3)
MONOCYTES NFR BLD AUTO: 5 %
NEUTROPHILS # BLD AUTO: 14.2 10E3/UL (ref 1.6–8.3)
NEUTROPHILS NFR BLD AUTO: 78 %
NRBC # BLD AUTO: 0 10E3/UL
NRBC BLD AUTO-RTO: 0 /100
PLATELET # BLD AUTO: 680 10E3/UL (ref 150–450)
RBC # BLD AUTO: 7.93 10E6/UL (ref 4.4–5.9)
WBC # BLD AUTO: 18.2 10E3/UL (ref 4–11)

## 2024-12-23 PROCEDURE — 88237 TISSUE CULTURE BONE MARROW: CPT

## 2024-12-23 PROCEDURE — 250N000013 HC RX MED GY IP 250 OP 250 PS 637

## 2024-12-23 PROCEDURE — 88280 CHROMOSOME KARYOTYPE STUDY: CPT

## 2024-12-23 PROCEDURE — 81339 MPL GENE SEQ ALYS EXON 10: CPT | Performed by: INTERNAL MEDICINE

## 2024-12-23 PROCEDURE — G0452 MOLECULAR PATHOLOGY INTERPR: HCPCS | Mod: 26 | Performed by: STUDENT IN AN ORGANIZED HEALTH CARE EDUCATION/TRAINING PROGRAM

## 2024-12-23 PROCEDURE — 36415 COLL VENOUS BLD VENIPUNCTURE: CPT

## 2024-12-23 PROCEDURE — 38222 DX BONE MARROW BX & ASPIR: CPT

## 2024-12-23 PROCEDURE — 85004 AUTOMATED DIFF WBC COUNT: CPT

## 2024-12-23 PROCEDURE — 88185 FLOWCYTOMETRY/TC ADD-ON: CPT

## 2024-12-23 PROCEDURE — 81279 JAK2 GENE TRGT SEQUENCE ALYS: CPT | Performed by: INTERNAL MEDICINE

## 2024-12-23 RX ORDER — ACETAMINOPHEN 325 MG/1
650 TABLET ORAL EVERY 4 HOURS PRN
Status: DISPENSED | OUTPATIENT
Start: 2024-12-23

## 2024-12-23 RX ORDER — LORAZEPAM 0.5 MG/1
1 TABLET ORAL EVERY 4 HOURS PRN
Status: CANCELLED | OUTPATIENT
Start: 2024-12-23

## 2024-12-23 RX ADMIN — ACETAMINOPHEN 650 MG: 325 TABLET ORAL at 09:33

## 2024-12-23 NOTE — PROGRESS NOTES
Derik was at clinic today for a bone marrow biopsy  Consent for procedure was reviewed with Derik and signed. Premedication of tylenol was administered. Education was reviewed about the biopsy procedure and symptoms post biopsy to monitor. Educational handout was provided and contact information highlighted for any questions or concerns that may arise. Dressing was inspected and was dry and intact with no shadow of blood on the dressing. Derik left clinic ambulatory and wife provided transportation home. Patient will return to clinic on 1/9/25 with Dr. Spaulding to review the bone marrow biopsy results.    Shelly Carvajal RN

## 2024-12-23 NOTE — PROGRESS NOTES
BMT ONC Adult Bone Marrow Biopsy Procedure Note  December 23, 2024  BP (!) 157/84   Pulse 76   Resp 18   Wt 98 kg (216 lb)   SpO2 95%   BMI 27.73 kg/m       DIAGNOSIS: Erythrocytosis     PROCEDURE: Unilateral Bone Marrow Biopsy and Unilateral Aspirate    LOCATION: [facility]    Patient s identification was positively verified by patient identification band and invasive procedure safety checklist was completed. Informed consent was obtained. Following the administration of  Tylenol  as pre-medication, patient was placed in the prone position and prepped and draped in a sterile manner. Approximately 7 cc of 1% Lidocaine was used over the left posterior iliac spine. Following this a 3 mm incision was made. Trephine bone marrow core(s) was (were) obtained from the [facility]. Bone marrow aspirates were obtained from the [facility]. Aspirates were sent for morphology, immunophenotyping, cytogenetics. A total of approximately 5 ml of marrow was aspirated. Following this procedure a sterile dressing was applied to the bone marrow biopsy site(s). The patient was placed in the supine position to maintain pressure on the biopsy site. Post-procedure wound care instructions were given.     Complications: NO    Pre-procedural pain: 0 out of 10 on the numeric pain rating scale.     Procedural pain: 2 out of 10 on the numeric pain rating scale.     Post-procedural pain assessment: 0 out of 10 on the numeric pain rating scale.     Interventions: NO    Length of procedure:21 minutes to 45 minutes    Procedure performed by: MATEO Luciano CNP

## 2024-12-24 LAB
PATH REPORT.COMMENTS IMP SPEC: NORMAL
PATH REPORT.FINAL DX SPEC: NORMAL
PATH REPORT.MICROSCOPIC SPEC OTHER STN: NORMAL
PATH REPORT.RELEVANT HX SPEC: NORMAL

## 2024-12-26 ENCOUNTER — APPOINTMENT (OUTPATIENT)
Dept: LAB | Facility: CLINIC | Age: 64
End: 2024-12-26
Payer: MEDICARE

## 2024-12-26 ENCOUNTER — LAB (OUTPATIENT)
Dept: LAB | Facility: CLINIC | Age: 64
End: 2024-12-26
Payer: MEDICARE

## 2024-12-26 DIAGNOSIS — D75.839 THROMBOCYTOSIS: ICD-10-CM

## 2024-12-26 DIAGNOSIS — D75.1 ERYTHROCYTOSIS: ICD-10-CM

## 2024-12-26 LAB
BASOPHILS # BLD AUTO: 0.4 10E3/UL (ref 0–0.2)
BASOPHILS NFR BLD AUTO: 2 %
EOSINOPHIL # BLD AUTO: 0.9 10E3/UL (ref 0–0.7)
EOSINOPHIL NFR BLD AUTO: 5 %
ERYTHROCYTE [DISTWIDTH] IN BLOOD BY AUTOMATED COUNT: 21.9 % (ref 10–15)
HCT VFR BLD AUTO: 59.1 % (ref 40–53)
HGB BLD-MCNC: 18.4 G/DL (ref 13.3–17.7)
IMM GRANULOCYTES # BLD: 0.1 10E3/UL
IMM GRANULOCYTES NFR BLD: 1 %
LYMPHOCYTES # BLD AUTO: 1.7 10E3/UL (ref 0.8–5.3)
LYMPHOCYTES NFR BLD AUTO: 10 %
MCH RBC QN AUTO: 23.1 PG (ref 26.5–33)
MCHC RBC AUTO-ENTMCNC: 31.1 G/DL (ref 31.5–36.5)
MCV RBC AUTO: 74 FL (ref 78–100)
MONOCYTES # BLD AUTO: 1 10E3/UL (ref 0–1.3)
MONOCYTES NFR BLD AUTO: 6 %
NEUTROPHILS # BLD AUTO: 12.5 10E3/UL (ref 1.6–8.3)
NEUTROPHILS NFR BLD AUTO: 75 %
NRBC # BLD AUTO: 0 10E3/UL
NRBC BLD AUTO-RTO: 0 /100
PLATELET # BLD AUTO: 572 10E3/UL (ref 150–450)
RBC # BLD AUTO: 7.96 10E6/UL (ref 4.4–5.9)
WBC # BLD AUTO: 16.7 10E3/UL (ref 4–11)

## 2024-12-30 ENCOUNTER — INFUSION THERAPY VISIT (OUTPATIENT)
Dept: INFUSION THERAPY | Facility: HOSPITAL | Age: 64
End: 2024-12-30
Attending: INTERNAL MEDICINE
Payer: MEDICARE

## 2024-12-30 VITALS
SYSTOLIC BLOOD PRESSURE: 149 MMHG | DIASTOLIC BLOOD PRESSURE: 92 MMHG | HEART RATE: 75 BPM | RESPIRATION RATE: 16 BRPM | OXYGEN SATURATION: 95 % | TEMPERATURE: 97.9 F

## 2024-12-30 DIAGNOSIS — D75.1 ERYTHROCYTOSIS: Primary | ICD-10-CM

## 2024-12-30 RX ORDER — HEPARIN SODIUM (PORCINE) LOCK FLUSH IV SOLN 100 UNIT/ML 100 UNIT/ML
5 SOLUTION INTRAVENOUS
OUTPATIENT
Start: 2024-12-30

## 2024-12-30 RX ORDER — HEPARIN SODIUM,PORCINE 10 UNIT/ML
5-20 VIAL (ML) INTRAVENOUS DAILY PRN
OUTPATIENT
Start: 2024-12-30

## 2024-12-30 NOTE — PROGRESS NOTES
Infusion Nursing Note:  Derik ERAZO Ishaan presents today for Phlebotomy.    Patient seen by provider today: No   present during visit today: Not Applicable.    Note:   Vitals:    12/30/24 0753 12/30/24 0827   BP: (!) 155/88 (!) 149/92   Patient Position: Sitting    Pulse: 72 75   Resp: 16    Temp: 97.9  F (36.6  C)    TempSrc: Oral    SpO2: 95%     .  Pre/post phlebotomy VSS. Patient tolerated procedure well. 500 ml blood removed without issue over 13 minutes.    Intravenous Access:  Peripheral IV placed.    Treatment Conditions:  Hemoglobin   Date Value Ref Range Status   12/26/2024 18.4 (H) 13.3 - 17.7 g/dL Final   ]Parameter = Hgb >14 to phlebotomize 500 ml.      Post Infusion Assessment:  Patient tolerated phlebotomy well.       Discharge Plan:   Discharge instructions reviewed with: Patient.  Patient and/or family verbalized understanding of discharge instructions and all questions answered.  Patient discharged in stable condition accompanied by: self.  Departure Mode: Ambulatory.      Ely Fox RN

## 2024-12-31 LAB
PATH REPORT.COMMENTS IMP SPEC: ABNORMAL
PATH REPORT.COMMENTS IMP SPEC: YES
PATH REPORT.FINAL DX SPEC: ABNORMAL
PATH REPORT.GROSS SPEC: ABNORMAL
PATH REPORT.MICROSCOPIC SPEC OTHER STN: ABNORMAL
PATH REPORT.MICROSCOPIC SPEC OTHER STN: ABNORMAL
PATH REPORT.RELEVANT HX SPEC: ABNORMAL

## 2025-01-06 ENCOUNTER — INFUSION THERAPY VISIT (OUTPATIENT)
Dept: INFUSION THERAPY | Facility: HOSPITAL | Age: 65
End: 2025-01-06
Attending: INTERNAL MEDICINE
Payer: MEDICARE

## 2025-01-06 VITALS — SYSTOLIC BLOOD PRESSURE: 140 MMHG | HEART RATE: 70 BPM | DIASTOLIC BLOOD PRESSURE: 82 MMHG | TEMPERATURE: 97.8 F

## 2025-01-06 VITALS
TEMPERATURE: 97.9 F | HEART RATE: 80 BPM | RESPIRATION RATE: 16 BRPM | SYSTOLIC BLOOD PRESSURE: 136 MMHG | DIASTOLIC BLOOD PRESSURE: 78 MMHG | OXYGEN SATURATION: 96 %

## 2025-01-06 DIAGNOSIS — D75.1 ERYTHROCYTOSIS: Primary | ICD-10-CM

## 2025-01-06 DIAGNOSIS — D75.1 ERYTHROCYTOSIS: ICD-10-CM

## 2025-01-06 DIAGNOSIS — D75.839 THROMBOCYTOSIS: ICD-10-CM

## 2025-01-06 LAB
BASOPHILS # BLD AUTO: 0.3 10E3/UL (ref 0–0.2)
BASOPHILS NFR BLD AUTO: 2 %
EOSINOPHIL # BLD AUTO: 0.6 10E3/UL (ref 0–0.7)
EOSINOPHIL NFR BLD AUTO: 5 %
ERYTHROCYTE [DISTWIDTH] IN BLOOD BY AUTOMATED COUNT: 22.5 % (ref 10–15)
HCT VFR BLD AUTO: 58.5 % (ref 40–53)
HGB BLD-MCNC: 17.7 G/DL (ref 13.3–17.7)
IMM GRANULOCYTES # BLD: 0.1 10E3/UL
IMM GRANULOCYTES NFR BLD: 1 %
LYMPHOCYTES # BLD AUTO: 1.4 10E3/UL (ref 0.8–5.3)
LYMPHOCYTES NFR BLD AUTO: 11 %
MCH RBC QN AUTO: 22.8 PG (ref 26.5–33)
MCHC RBC AUTO-ENTMCNC: 30.3 G/DL (ref 31.5–36.5)
MCV RBC AUTO: 75 FL (ref 78–100)
MONOCYTES # BLD AUTO: 0.6 10E3/UL (ref 0–1.3)
MONOCYTES NFR BLD AUTO: 5 %
NEUTROPHILS # BLD AUTO: 10.1 10E3/UL (ref 1.6–8.3)
NEUTROPHILS NFR BLD AUTO: 77 %
NRBC # BLD AUTO: 0 10E3/UL
NRBC BLD AUTO-RTO: 0 /100
PLATELET # BLD AUTO: 471 10E3/UL (ref 150–450)
RBC # BLD AUTO: 7.76 10E6/UL (ref 4.4–5.9)
WBC # BLD AUTO: 13.1 10E3/UL (ref 4–11)

## 2025-01-06 PROCEDURE — 85025 COMPLETE CBC W/AUTO DIFF WBC: CPT

## 2025-01-06 PROCEDURE — 36415 COLL VENOUS BLD VENIPUNCTURE: CPT

## 2025-01-06 RX ORDER — HEPARIN SODIUM,PORCINE 10 UNIT/ML
5-20 VIAL (ML) INTRAVENOUS DAILY PRN
OUTPATIENT
Start: 2025-01-13

## 2025-01-06 RX ORDER — HEPARIN SODIUM (PORCINE) LOCK FLUSH IV SOLN 100 UNIT/ML 100 UNIT/ML
5 SOLUTION INTRAVENOUS
OUTPATIENT
Start: 2025-01-13

## 2025-01-06 NOTE — PROGRESS NOTES
Infusion Nursing Note:  Derik Quiros presents today for phlebotomy.    Intravenous Access:  PIV placed in left medial AC    Treatment Conditions:  Results reviewed, labs MET treatment parameters, ok to proceed with treatment.    Post Infusion Assessment:  Patient tolerated phlebotomy without incident.     Discharge Plan:   Patient discharged in stable condition accompanied by: brendon TAPIA RN

## 2025-01-07 LAB
CULTURE HARVEST COMPLETE DATE: NORMAL

## 2025-01-09 ENCOUNTER — APPOINTMENT (OUTPATIENT)
Dept: LAB | Facility: CLINIC | Age: 65
End: 2025-01-09
Attending: INTERNAL MEDICINE
Payer: MEDICARE

## 2025-01-09 ENCOUNTER — ONCOLOGY VISIT (OUTPATIENT)
Dept: ONCOLOGY | Facility: CLINIC | Age: 65
End: 2025-01-09
Attending: INTERNAL MEDICINE
Payer: MEDICARE

## 2025-01-09 VITALS
WEIGHT: 220 LBS | HEART RATE: 79 BPM | TEMPERATURE: 98 F | SYSTOLIC BLOOD PRESSURE: 167 MMHG | OXYGEN SATURATION: 98 % | RESPIRATION RATE: 16 BRPM | BODY MASS INDEX: 28.25 KG/M2 | DIASTOLIC BLOOD PRESSURE: 97 MMHG

## 2025-01-09 DIAGNOSIS — D75.839 THROMBOCYTOSIS: ICD-10-CM

## 2025-01-09 DIAGNOSIS — D75.1 ERYTHROCYTOSIS: ICD-10-CM

## 2025-01-09 DIAGNOSIS — D45 POLYCYTHEMIA VERA (H): Primary | ICD-10-CM

## 2025-01-09 LAB
ADDITIONAL COMMENTS: NORMAL
BASOPHILS # BLD AUTO: 0.4 10E3/UL (ref 0–0.2)
BASOPHILS NFR BLD AUTO: 2 %
EOSINOPHIL # BLD AUTO: 0.8 10E3/UL (ref 0–0.7)
EOSINOPHIL NFR BLD AUTO: 5 %
ERYTHROCYTE [DISTWIDTH] IN BLOOD BY AUTOMATED COUNT: 22 % (ref 10–15)
HCT VFR BLD AUTO: 55.1 % (ref 40–53)
HGB BLD-MCNC: 17 G/DL (ref 13.3–17.7)
IMM GRANULOCYTES # BLD: 0.1 10E3/UL
IMM GRANULOCYTES NFR BLD: 1 %
INTERPRETATION: NORMAL
ISCN: NORMAL
LYMPHOCYTES # BLD AUTO: 1.7 10E3/UL (ref 0.8–5.3)
LYMPHOCYTES NFR BLD AUTO: 10 %
MCH RBC QN AUTO: 23 PG (ref 26.5–33)
MCHC RBC AUTO-ENTMCNC: 30.9 G/DL (ref 31.5–36.5)
MCV RBC AUTO: 75 FL (ref 78–100)
METHODS: NORMAL
MONOCYTES # BLD AUTO: 0.9 10E3/UL (ref 0–1.3)
MONOCYTES NFR BLD AUTO: 5 %
NEUTROPHILS # BLD AUTO: 13.4 10E3/UL (ref 1.6–8.3)
NEUTROPHILS NFR BLD AUTO: 77 %
NRBC # BLD AUTO: 0 10E3/UL
NRBC BLD AUTO-RTO: 0 /100
PLATELET # BLD AUTO: 486 10E3/UL (ref 150–450)
RBC # BLD AUTO: 7.38 10E6/UL (ref 4.4–5.9)
WBC # BLD AUTO: 17.3 10E3/UL (ref 4–11)

## 2025-01-09 PROCEDURE — 85041 AUTOMATED RBC COUNT: CPT | Performed by: INTERNAL MEDICINE

## 2025-01-09 PROCEDURE — 36415 COLL VENOUS BLD VENIPUNCTURE: CPT | Performed by: INTERNAL MEDICINE

## 2025-01-09 PROCEDURE — 85014 HEMATOCRIT: CPT | Performed by: INTERNAL MEDICINE

## 2025-01-09 PROCEDURE — 81450 HL NEO GSAP 5-50DNA/DNA&RNA: CPT | Performed by: INTERNAL MEDICINE

## 2025-01-09 PROCEDURE — G0452 MOLECULAR PATHOLOGY INTERPR: HCPCS | Mod: 26 | Performed by: STUDENT IN AN ORGANIZED HEALTH CARE EDUCATION/TRAINING PROGRAM

## 2025-01-09 PROCEDURE — 85004 AUTOMATED DIFF WBC COUNT: CPT | Performed by: INTERNAL MEDICINE

## 2025-01-09 PROCEDURE — G0463 HOSPITAL OUTPT CLINIC VISIT: HCPCS | Performed by: INTERNAL MEDICINE

## 2025-01-09 RX ORDER — HYDROXYUREA 500 MG/1
1000 CAPSULE ORAL DAILY
Qty: 180 CAPSULE | Refills: 3 | Status: SHIPPED | OUTPATIENT
Start: 2025-01-09

## 2025-01-09 ASSESSMENT — PAIN SCALES - GENERAL: PAINLEVEL_OUTOF10: NO PAIN (0)

## 2025-01-09 NOTE — PATIENT INSTRUCTIONS
Increase the hydroxyurea to 1000 mg ( two 500 mg pills) daily.  Keep therapeutic phlebotomy appointments  Follow up in about 3 months   I have reviewed and confirmed nurses' notes...

## 2025-01-09 NOTE — LETTER
1/9/2025      Derik Quiros  [address]  [facility] [address]      Dear Colleague,    Thank you for referring your patient, Derik Quiros, to the [facility]. Please see a copy of my visit note below.    Hematology Clinic note  In person    Problem List:  -Polycythemia vera  Collected 12/23/24       Resulting Agency   Final Diagnosis   BONE MARROW, LEFT POSTERIOR ILIAC CREST, ASPIRATION AND CORE BIOPSY:  -HYPERCELLULAR MARROW FOR AGE (80%), FEATURES CONSISTENT WITH CHRONIC MYELOPROLIFERATIVE DISORDER  -ERYTHROID HYPERPLASIA  -MEGAKARYOCYTIC HYPERPLASIA  -MODERATE RETICULIN FIBROSIS  -APPROXIMATELY 3% BLASTS  -PLEASE SEE COMMENT     PERIPHERAL BLOOD:  -ERYTHROCYTOSIS WITH INCREASE IN HEMOGLOBIN AND HEMATOCRIT  -MICROCYTIC RED BLOOD CELLS ARE PRESENT  -THROMBOCYTOSIS  -LEUKOCYTOSIS WITH ABSOLUTE NEUTROPHILIA, EOSINOPHILIA, AND BASOPHILIA  -APPROXIMATELY 1% BLASTS    Electronically signed by Nas Castro MD on 12/31/2024 at  4:35 PM   Comment  [facility]   Flow cytometric analysis of bone marrow (see case [id number], [facility]) shows no evidence of acute leukemia or malignant lymphoproliferative disorder.   The results of cytogenetic analysis and next-generation sequencing will be reported separately in electronic health record.           -History of PFO with stroke, status post closure Decades ago-open procedure  - Hypertension  - s/p left hip replacement 2011  -Left Hip fracture, status post open reduction internal fixation 5/14/2022  - History of colonic polyps, colonoscopy 3/20/24  -History of nonmelanoma skin cancer removed from cheek and back 2019  -History of liver hemangioma  -Anxiety  -Status post appendectomy  - Status post testicular varicocele surgery.  -Possible aspirin allergy, edema    History of Present Illness: Mr. Quiros is a 64-year-old man here for follow up of polycythemia vera.  He is accompanied by his  wife, Leeanne.  He has had 4 therapeutic phlebotomies.  He was started on hydroxyurea 500 mg daily. He had a bone marrow biopsy 12/23/24, which showed moderate fibrosis, no increase in blasts. Karyotype is normal.  Myeloid molecular studies are pending.    Today he is feeling okay.  No difficulty with the therapeutic phlebotomy.  No headaches, vision changes.  He has 1 finger that feels a bit numb, and a couple of toes to feel a bit numb, no redness.  No problem with early satiety or abdominal pain.  He is tolerating the hydroxyurea 500 mg daily well      PHYSICAL EXAMINATION:  BP (!) 167/97   Pulse 79   Temp 98  F (36.7  C)   Resp 16   Wt 99.8 kg (220 lb)   SpO2 98%   BMI 28.25 kg/m      General appearance:  Patient is 64 year old man in no acute distress.     HEENT:  No pallor, icterus,   Not otherwise examined    Labs:   Latest Reference Range & Units 01/06/25 10:55 01/09/25 10:08   WBC 4.0 - 11.0 10e3/uL 13.1 (H) 17.3 (H)   Hemoglobin 13.3 - 17.7 g/dL 17.7 17.0   Hematocrit 40.0 - 53.0 % 58.5 (H) 55.1 (H)   Platelet Count 150 - 450 10e3/uL 471 (H) 486 (H)   RBC Count 4.40 - 5.90 10e6/uL 7.76 (H) 7.38 (H)   MCV 78 - 100 fL 75 (L) 75 (L)   MCH 26.5 - 33.0 pg 22.8 (L) 23.0 (L)   MCHC 31.5 - 36.5 g/dL 30.3 (L) 30.9 (L)   RDW 10.0 - 15.0 % 22.5 (H) 22.0 (H)   % Neutrophils % 77 77   % Lymphocytes % 11 10   % Monocytes % 5 5   % Eosinophils % 5 5   % Basophils % 2 2   Absolute Basophils 0.0 - 0.2 10e3/uL 0.3 (H) 0.4 (H)   Absolute Eosinophils 0.0 - 0.7 10e3/uL 0.6 0.8 (H)   Absolute Immature Granulocytes <=0.4 10e3/uL 0.1 0.1   Absolute Lymphocytes 0.8 - 5.3 10e3/uL 1.4 1.7   Absolute Monocytes 0.0 - 1.3 10e3/uL 0.6 0.9   % Immature Granulocytes % 1 1   Absolute Neutrophils 1.6 - 8.3 10e3/uL 10.1 (H) 13.4 (H)   (H): Data is abnormally high  (L): Data is abnormally lowAM:   Imaging:    US ABD COMPLETE  LOCATION: [facility]  DATE: 12/5/2024    INDICATION: Follow up liver hemangioma and splenomegaly,  Hemangioma of intra-abdominal structures  COMPARISON: 4/30/2024  TECHNIQUE: Complete abdominal ultrasound.    FINDINGS:    GALLBLADDER: Gallstones in an otherwise normal gallbladder. No wall thickening, or pericholecystic fluid. Negative sonographic Barron's sign.    BILE DUCTS: No biliary dilatation. The common duct measures 5 mm.    LIVER: Normal parenchyma with smooth contour. Multiple benign cysts with the largest measuring 8.1 x 5.2 x 7.0 cm in the inferior right lobe, previously 7.0 x 5.5 x 5.9 cm. A stable solid hyperechoic 1.6 x 1.0 x 1.2 cm avascular mass in the right lobe. A 1.0 x 0.7 x 0.9 cm hyperechoic avascular mass in the right lobe which was not definitively seen previously but is too small to characterize. The portal vein is patent with flow in the normal direction.    RIGHT KIDNEY: Normal size. Mild hydronephrosis. No contour distorting mass or shadowing stone.    LEFT KIDNEY: Normal size. Simple renal cysts with the largest measuring 4.5 cm in the upper pole, no follow-up required. No hydronephrosis or shadowing stone.    SPLEEN: Enlarged measuring 16.1 cm in maximal dimension, previously 16.5 cm.    PANCREAS: The visualized portions are normal.    AORTA: Normal in caliber.    IVC: Normal where visualized.    No ascites.    IMPRESSION:  1.  A stable 1.6 cm hyperechoic mass in the right lobe of the liver which most likely represents a hemangioma.  2.  Slowly enlarging hepatic cysts.  3.  No significant interval change in splenomegaly.  4.  Mild right hydronephrosis.  5.  Cholelithiasis.  Exam End: 12/05/24  9:09 AM       Assessment and Recommendation: -     Polycythemia vera, High risk due to age . Has received 3 therapeutic phlebotomies, ans started on hydroxyurea 500 mg daily.  This has not been adequate to control hemoglobin, would like to get the hydroxyurea dose high enough so he does not need therapeutic phlebotomy, but without suppressing white count and platelets too much.  If we cannot get  his hemoglobin under control with the hydroxyurea, or if he has significant molecular abnormality in addition to the JAK2 mutation, Ropeginterferon could be considered instead of hydroxyurea.  The median survival with polycythemia vera is 13 years.  There is a 5-10% chance of eventual leukemic or myelofibrosis transformation.  I discussed at length with Simon and his wife regarding the bone marrow biopsy results, prognosis, management.  I advised that it would be best to wait at least 3 months before considering TURP surgery and any sort of travel > 1 week.  For the next several weeks, he will need weekly labs, but I am hoping after that it will settle down to monthly, and eventually will be needing labs to every 3 to 6 months. I answered his full list of questions.   -Increase hydroxyurea to 1000 mg daily  - Continue clopidogrel 75 mg daily  -Continue weekly labs therapeutic phlebotomy for hematocrit greater than 45    #Hypertension-continue on amlodipine     RTC 3 months    Time: I spent a total of 60 minutes on the day of the visit. Please see the note for further information on patient assessment and treatment.     Inez Spaulding MD  Hematology      Again, thank you for allowing me to participate in the care of your patient.        Sincerely,        Inez Spaulding MD    Electronically signed

## 2025-01-09 NOTE — PROGRESS NOTES
Hematology Clinic note  In person    Problem List:  -Polycythemia vera  Collected 12/23/24       Resulting Agency   Final Diagnosis   BONE MARROW, LEFT POSTERIOR ILIAC CREST, ASPIRATION AND CORE BIOPSY:  -HYPERCELLULAR MARROW FOR AGE (80%), FEATURES CONSISTENT WITH CHRONIC MYELOPROLIFERATIVE DISORDER  -ERYTHROID HYPERPLASIA  -MEGAKARYOCYTIC HYPERPLASIA  -MODERATE RETICULIN FIBROSIS  -APPROXIMATELY 3% BLASTS  -PLEASE SEE COMMENT     PERIPHERAL BLOOD:  -ERYTHROCYTOSIS WITH INCREASE IN HEMOGLOBIN AND HEMATOCRIT  -MICROCYTIC RED BLOOD CELLS ARE PRESENT  -THROMBOCYTOSIS  -LEUKOCYTOSIS WITH ABSOLUTE NEUTROPHILIA, EOSINOPHILIA, AND BASOPHILIA  -APPROXIMATELY 1% BLASTS    Electronically signed by Nas Castro MD on 12/31/2024 at  4:35 PM   Comment  [facility]   Flow cytometric analysis of bone marrow (see case [id number], [facility]) shows no evidence of acute leukemia or malignant lymphoproliferative disorder.   The results of cytogenetic analysis and next-generation sequencing will be reported separately in electronic health record.           -History of PFO with stroke, status post closure Decades ago-open procedure  - Hypertension  - s/p left hip replacement 2011  -Left Hip fracture, status post open reduction internal fixation 5/14/2022  - History of colonic polyps, colonoscopy 3/20/24  -History of nonmelanoma skin cancer removed from cheek and back 2019  -History of liver hemangioma  -Anxiety  -Status post appendectomy  - Status post testicular varicocele surgery.  -Possible aspirin allergy, edema    History of Present Illness: Mr. Quiros is a 64-year-old man here for follow up of polycythemia vera.  He is accompanied by his wife, Leeanne.  He has had 4 therapeutic phlebotomies.  He was started on hydroxyurea 500 mg daily. He had a bone marrow biopsy 12/23/24, which showed moderate fibrosis, no increase in blasts. Karyotype is normal.  Myeloid molecular studies are  pending.    Today he is feeling okay.  No difficulty with the therapeutic phlebotomy.  No headaches, vision changes.  He has 1 finger that feels a bit numb, and a couple of toes to feel a bit numb, no redness.  No problem with early satiety or abdominal pain.  He is tolerating the hydroxyurea 500 mg daily well      PHYSICAL EXAMINATION:  BP (!) 167/97   Pulse 79   Temp 98  F (36.7  C)   Resp 16   Wt 99.8 kg (220 lb)   SpO2 98%   BMI 28.25 kg/m      General appearance:  Patient is 64 year old man in no acute distress.     HEENT:  No pallor, icterus,   Not otherwise examined    Labs:   Latest Reference Range & Units 01/06/25 10:55 01/09/25 10:08   WBC 4.0 - 11.0 10e3/uL 13.1 (H) 17.3 (H)   Hemoglobin 13.3 - 17.7 g/dL 17.7 17.0   Hematocrit 40.0 - 53.0 % 58.5 (H) 55.1 (H)   Platelet Count 150 - 450 10e3/uL 471 (H) 486 (H)   RBC Count 4.40 - 5.90 10e6/uL 7.76 (H) 7.38 (H)   MCV 78 - 100 fL 75 (L) 75 (L)   MCH 26.5 - 33.0 pg 22.8 (L) 23.0 (L)   MCHC 31.5 - 36.5 g/dL 30.3 (L) 30.9 (L)   RDW 10.0 - 15.0 % 22.5 (H) 22.0 (H)   % Neutrophils % 77 77   % Lymphocytes % 11 10   % Monocytes % 5 5   % Eosinophils % 5 5   % Basophils % 2 2   Absolute Basophils 0.0 - 0.2 10e3/uL 0.3 (H) 0.4 (H)   Absolute Eosinophils 0.0 - 0.7 10e3/uL 0.6 0.8 (H)   Absolute Immature Granulocytes <=0.4 10e3/uL 0.1 0.1   Absolute Lymphocytes 0.8 - 5.3 10e3/uL 1.4 1.7   Absolute Monocytes 0.0 - 1.3 10e3/uL 0.6 0.9   % Immature Granulocytes % 1 1   Absolute Neutrophils 1.6 - 8.3 10e3/uL 10.1 (H) 13.4 (H)   (H): Data is abnormally high  (L): Data is abnormally lowAM:   Imaging:    US ABD COMPLETE  LOCATION: [facility]  DATE: 12/5/2024    INDICATION: Follow up liver hemangioma and splenomegaly, Hemangioma of intra-abdominal structures  COMPARISON: 4/30/2024  TECHNIQUE: Complete abdominal ultrasound.    FINDINGS:    GALLBLADDER: Gallstones in an otherwise normal gallbladder. No wall thickening, or pericholecystic fluid. Negative sonographic  Barron's sign.    BILE DUCTS: No biliary dilatation. The common duct measures 5 mm.    LIVER: Normal parenchyma with smooth contour. Multiple benign cysts with the largest measuring 8.1 x 5.2 x 7.0 cm in the inferior right lobe, previously 7.0 x 5.5 x 5.9 cm. A stable solid hyperechoic 1.6 x 1.0 x 1.2 cm avascular mass in the right lobe. A 1.0 x 0.7 x 0.9 cm hyperechoic avascular mass in the right lobe which was not definitively seen previously but is too small to characterize. The portal vein is patent with flow in the normal direction.    RIGHT KIDNEY: Normal size. Mild hydronephrosis. No contour distorting mass or shadowing stone.    LEFT KIDNEY: Normal size. Simple renal cysts with the largest measuring 4.5 cm in the upper pole, no follow-up required. No hydronephrosis or shadowing stone.    SPLEEN: Enlarged measuring 16.1 cm in maximal dimension, previously 16.5 cm.    PANCREAS: The visualized portions are normal.    AORTA: Normal in caliber.    IVC: Normal where visualized.    No ascites.    IMPRESSION:  1.  A stable 1.6 cm hyperechoic mass in the right lobe of the liver which most likely represents a hemangioma.  2.  Slowly enlarging hepatic cysts.  3.  No significant interval change in splenomegaly.  4.  Mild right hydronephrosis.  5.  Cholelithiasis.  Exam End: 12/05/24  9:09 AM       Assessment and Recommendation: -     Polycythemia vera, High risk due to age . Has received 3 therapeutic phlebotomies, ans started on hydroxyurea 500 mg daily.  This has not been adequate to control hemoglobin, would like to get the hydroxyurea dose high enough so he does not need therapeutic phlebotomy, but without suppressing white count and platelets too much.  If we cannot get his hemoglobin under control with the hydroxyurea, or if he has significant molecular abnormality in addition to the JAK2 mutation, Ropeginterferon could be considered instead of hydroxyurea.  The median survival with polycythemia vera is 13 years.   There is a 5-10% chance of eventual leukemic or myelofibrosis transformation.  I discussed at length with Simon and his wife regarding the bone marrow biopsy results, prognosis, management.  I advised that it would be best to wait at least 3 months before considering TURP surgery and any sort of travel > 1 week.  For the next several weeks, he will need weekly labs, but I am hoping after that it will settle down to monthly, and eventually will be needing labs to every 3 to 6 months. I answered his full list of questions.   -Increase hydroxyurea to 1000 mg daily  - Continue clopidogrel 75 mg daily  -Continue weekly labs therapeutic phlebotomy for hematocrit greater than 45    #Hypertension-continue on amlodipine     RTC 3 months    Time: I spent a total of 60 minutes on the day of the visit. Please see the note for further information on patient assessment and treatment.     Inez Spaulding MD  Hematology

## 2025-01-09 NOTE — TELEPHONE ENCOUNTER
"Hydroxyurea 500mg capsule  Last prescribing provider: Dr. Inez Spaulding    Last clinic visit date: 12/12/24    Recommendations for requested medication (if none, N/A): 12/12/24, \"Consider placing the hydroxyurea even before bone marrow biopsy is scheduled.\"    Any other pertinent information (if none, N/A): pharmacy requesting 90 day supply and diagnosis code.    Refilled: Y/N, if NO, why?    "

## 2025-01-09 NOTE — NURSING NOTE
"Oncology Rooming Note    January 9, 2025 10:31 AM   Derik Quiros is a 64 year old male who presents for:    Chief Complaint   Patient presents with    Blood Draw     Labs collected from venipuncture by RN. Vitals taken. Checked in for appointment(s).     Oncology Clinic Visit     Erthrocytosis; thrombocytosis      Initial Vitals: BP (!) 167/97   Pulse 79   Temp 98  F (36.7  C)   Resp 16   Wt 99.8 kg (220 lb)   SpO2 98%   BMI 28.25 kg/m   Estimated body mass index is 28.25 kg/m  as calculated from the following:    Height as of 12/12/24: 1.88 m (6' 2\").    Weight as of this encounter: 99.8 kg (220 lb). Body surface area is 2.28 meters squared.  No Pain (0) Comment: Data Unavailable   No LMP for male patient.  Allergies reviewed: Yes  Medications reviewed: Yes    Medications: Medication refills not needed today.  Pharmacy name entered into [facility]:    [id number] - [place], [place] - [address]  [id number] - [place], [place] - [address] AT [facility] [address]    Frailty Screening:   Is the patient here for a new oncology consult visit in cancer care? 2. No      Clinical concerns:        Cassidy Knox              "

## 2025-01-09 NOTE — NURSING NOTE
Chief Complaint   Patient presents with    Blood Draw     Labs collected from venipuncture by RN. Vitals taken. Checked in for appointment(s).      Labs collected from venipuncture by RN. Vitals taken. Checked in for appointment(s).     Buffy Lee RN

## 2025-01-14 ENCOUNTER — INFUSION THERAPY VISIT (OUTPATIENT)
Dept: INFUSION THERAPY | Facility: HOSPITAL | Age: 65
End: 2025-01-14
Attending: INTERNAL MEDICINE
Payer: MEDICARE

## 2025-01-14 VITALS
DIASTOLIC BLOOD PRESSURE: 85 MMHG | HEART RATE: 72 BPM | SYSTOLIC BLOOD PRESSURE: 135 MMHG | RESPIRATION RATE: 16 BRPM | OXYGEN SATURATION: 97 %

## 2025-01-14 DIAGNOSIS — D75.839 THROMBOCYTOSIS: ICD-10-CM

## 2025-01-14 DIAGNOSIS — D75.1 ERYTHROCYTOSIS: Primary | ICD-10-CM

## 2025-01-14 LAB
BASOPHILS # BLD AUTO: 0.3 10E3/UL (ref 0–0.2)
BASOPHILS NFR BLD AUTO: 2 %
EOSINOPHIL # BLD AUTO: 0.6 10E3/UL (ref 0–0.7)
EOSINOPHIL NFR BLD AUTO: 4 %
ERYTHROCYTE [DISTWIDTH] IN BLOOD BY AUTOMATED COUNT: 21.9 % (ref 10–15)
HCT VFR BLD AUTO: 55.5 % (ref 40–53)
HGB BLD-MCNC: 17.1 G/DL (ref 13.3–17.7)
IMM GRANULOCYTES # BLD: 0.1 10E3/UL
IMM GRANULOCYTES NFR BLD: 1 %
LYMPHOCYTES # BLD AUTO: 1.5 10E3/UL (ref 0.8–5.3)
LYMPHOCYTES NFR BLD AUTO: 10 %
MCH RBC QN AUTO: 23 PG (ref 26.5–33)
MCHC RBC AUTO-ENTMCNC: 30.8 G/DL (ref 31.5–36.5)
MCV RBC AUTO: 75 FL (ref 78–100)
MONOCYTES # BLD AUTO: 0.7 10E3/UL (ref 0–1.3)
MONOCYTES NFR BLD AUTO: 5 %
NEUTROPHILS # BLD AUTO: 11.1 10E3/UL (ref 1.6–8.3)
NEUTROPHILS NFR BLD AUTO: 78 %
NRBC # BLD AUTO: 0 10E3/UL
NRBC BLD AUTO-RTO: 0 /100
PLATELET # BLD AUTO: 429 10E3/UL (ref 150–450)
RBC # BLD AUTO: 7.44 10E6/UL (ref 4.4–5.9)
WBC # BLD AUTO: 14.2 10E3/UL (ref 4–11)

## 2025-01-14 PROCEDURE — 36415 COLL VENOUS BLD VENIPUNCTURE: CPT

## 2025-01-14 PROCEDURE — 85025 COMPLETE CBC W/AUTO DIFF WBC: CPT

## 2025-01-14 RX ORDER — HEPARIN SODIUM,PORCINE 10 UNIT/ML
5-20 VIAL (ML) INTRAVENOUS DAILY PRN
OUTPATIENT
Start: 2025-01-20

## 2025-01-14 RX ORDER — HEPARIN SODIUM (PORCINE) LOCK FLUSH IV SOLN 100 UNIT/ML 100 UNIT/ML
5 SOLUTION INTRAVENOUS
OUTPATIENT
Start: 2025-01-20

## 2025-01-14 NOTE — PROGRESS NOTES
Infusion Nursing Note:  Derik ERAZO Ishaan presents today for therapeutic phlebotomy.    Patient seen by provider today: No   present during visit today: Not Applicable.    Note: pt phlebotomy was sluggish today in left AC where he has used the last 3 times. I suggest using right AC next time. Pt iv was irrigated and last 50ml pulled off with syringe. 500ml was obtained today..      Intravenous Access:  Peripheral IV placed in left AC.    Treatment Conditions:  Lab Results   Component Value Date    HGB 17.1 01/14/2025    WBC 14.2 (H) 01/14/2025    ANEUTAUTO 11.1 (H) 01/14/2025     01/14/2025        Results reviewed, labs MET treatment parameters, ok to proceed with treatment.      Post Infusion Assessment:  Patient observed for 30 minutes post phlebotomy per protocol.   /85   Pulse 72   Resp 16   SpO2 97%      Discharge Plan:   Patient and/or family verbalized understanding of discharge instructions and all questions answered.      Mercy Diaz RN

## 2025-01-18 ENCOUNTER — HEALTH MAINTENANCE LETTER (OUTPATIENT)
Age: 65
End: 2025-01-18

## 2025-01-21 ENCOUNTER — LAB (OUTPATIENT)
Dept: INFUSION THERAPY | Facility: HOSPITAL | Age: 65
End: 2025-01-21
Attending: INTERNAL MEDICINE
Payer: MEDICARE

## 2025-01-21 VITALS
DIASTOLIC BLOOD PRESSURE: 74 MMHG | SYSTOLIC BLOOD PRESSURE: 134 MMHG | HEART RATE: 77 BPM | OXYGEN SATURATION: 98 % | RESPIRATION RATE: 16 BRPM | TEMPERATURE: 98.1 F

## 2025-01-21 DIAGNOSIS — D75.1 ERYTHROCYTOSIS: Primary | ICD-10-CM

## 2025-01-21 DIAGNOSIS — D75.839 THROMBOCYTOSIS: ICD-10-CM

## 2025-01-21 LAB
BASOPHILS # BLD AUTO: 0.3 10E3/UL (ref 0–0.2)
BASOPHILS NFR BLD AUTO: 2 %
EOSINOPHIL # BLD AUTO: 0.4 10E3/UL (ref 0–0.7)
EOSINOPHIL NFR BLD AUTO: 3 %
ERYTHROCYTE [DISTWIDTH] IN BLOOD BY AUTOMATED COUNT: 22.2 % (ref 10–15)
HCT VFR BLD AUTO: 53.7 % (ref 40–53)
HGB BLD-MCNC: 16.6 G/DL (ref 13.3–17.7)
IMM GRANULOCYTES # BLD: 0.1 10E3/UL
IMM GRANULOCYTES NFR BLD: 1 %
LYMPHOCYTES # BLD AUTO: 1.4 10E3/UL (ref 0.8–5.3)
LYMPHOCYTES NFR BLD AUTO: 8 %
MCH RBC QN AUTO: 23.3 PG (ref 26.5–33)
MCHC RBC AUTO-ENTMCNC: 30.9 G/DL (ref 31.5–36.5)
MCV RBC AUTO: 76 FL (ref 78–100)
MONOCYTES # BLD AUTO: 0.8 10E3/UL (ref 0–1.3)
MONOCYTES NFR BLD AUTO: 4 %
NEUTROPHILS # BLD AUTO: 14.4 10E3/UL (ref 1.6–8.3)
NEUTROPHILS NFR BLD AUTO: 83 %
NRBC # BLD AUTO: 0 10E3/UL
NRBC BLD AUTO-RTO: 0 /100
PLATELET # BLD AUTO: 344 10E3/UL (ref 150–450)
RBC # BLD AUTO: 7.11 10E6/UL (ref 4.4–5.9)
WBC # BLD AUTO: 17.3 10E3/UL (ref 4–11)

## 2025-01-21 PROCEDURE — 36415 COLL VENOUS BLD VENIPUNCTURE: CPT

## 2025-01-21 PROCEDURE — 85025 COMPLETE CBC W/AUTO DIFF WBC: CPT

## 2025-01-21 RX ORDER — HEPARIN SODIUM (PORCINE) LOCK FLUSH IV SOLN 100 UNIT/ML 100 UNIT/ML
5 SOLUTION INTRAVENOUS
OUTPATIENT
Start: 2025-01-27

## 2025-01-21 RX ORDER — HEPARIN SODIUM,PORCINE 10 UNIT/ML
5-20 VIAL (ML) INTRAVENOUS DAILY PRN
OUTPATIENT
Start: 2025-01-27

## 2025-01-21 NOTE — PROGRESS NOTES
Infusion Nursing Note:  Derik ERAZO Ishaan presents today for Labs and phlebatomy.    Patient seen by provider today: No   present during visit today: Not Applicable.    Note: Labs done, followed by phlebatomy per MD order.  Tolerated well 500 ml blood removed. He offers no complaints  Discharge to home ambulating per self..      Intravenous Access:  PIV    Treatment Conditions:  Results reviewed, labs MET treatment parameters, ok to proceed with treatment.      Post Infusion Assessment:  Access discontinued per protocol.       Discharge Plan:   Patient and/or family verbalized understanding of discharge instructions and all questions answered.      Belén Benavides RN

## 2025-01-28 ENCOUNTER — INFUSION THERAPY VISIT (OUTPATIENT)
Dept: INFUSION THERAPY | Facility: HOSPITAL | Age: 65
End: 2025-01-28
Attending: INTERNAL MEDICINE
Payer: MEDICARE

## 2025-01-28 VITALS
SYSTOLIC BLOOD PRESSURE: 160 MMHG | DIASTOLIC BLOOD PRESSURE: 86 MMHG | TEMPERATURE: 97.6 F | RESPIRATION RATE: 16 BRPM | OXYGEN SATURATION: 96 % | HEART RATE: 77 BPM

## 2025-01-28 DIAGNOSIS — D75.1 ERYTHROCYTOSIS: Primary | ICD-10-CM

## 2025-01-28 DIAGNOSIS — D75.839 THROMBOCYTOSIS: ICD-10-CM

## 2025-01-28 LAB
BASOPHILS # BLD AUTO: 0.3 10E3/UL (ref 0–0.2)
BASOPHILS NFR BLD AUTO: 2 %
EOSINOPHIL # BLD AUTO: 0.4 10E3/UL (ref 0–0.7)
EOSINOPHIL NFR BLD AUTO: 3 %
ERYTHROCYTE [DISTWIDTH] IN BLOOD BY AUTOMATED COUNT: 22.1 % (ref 10–15)
HCT VFR BLD AUTO: 51.2 % (ref 40–53)
HGB BLD-MCNC: 15.8 G/DL (ref 13.3–17.7)
IMM GRANULOCYTES # BLD: 0.1 10E3/UL
IMM GRANULOCYTES NFR BLD: 1 %
LYMPHOCYTES # BLD AUTO: 1.2 10E3/UL (ref 0.8–5.3)
LYMPHOCYTES NFR BLD AUTO: 9 %
MCH RBC QN AUTO: 23.2 PG (ref 26.5–33)
MCHC RBC AUTO-ENTMCNC: 30.9 G/DL (ref 31.5–36.5)
MCV RBC AUTO: 75 FL (ref 78–100)
MONOCYTES # BLD AUTO: 0.5 10E3/UL (ref 0–1.3)
MONOCYTES NFR BLD AUTO: 4 %
NEUTROPHILS # BLD AUTO: 11.1 10E3/UL (ref 1.6–8.3)
NEUTROPHILS NFR BLD AUTO: 81 %
NRBC # BLD AUTO: 0 10E3/UL
NRBC BLD AUTO-RTO: 0 /100
PLATELET # BLD AUTO: 269 10E3/UL (ref 150–450)
RBC # BLD AUTO: 6.8 10E6/UL (ref 4.4–5.9)
WBC # BLD AUTO: 13.7 10E3/UL (ref 4–11)

## 2025-01-28 PROCEDURE — 85004 AUTOMATED DIFF WBC COUNT: CPT

## 2025-01-28 PROCEDURE — 36415 COLL VENOUS BLD VENIPUNCTURE: CPT

## 2025-01-28 RX ORDER — HEPARIN SODIUM,PORCINE 10 UNIT/ML
5-20 VIAL (ML) INTRAVENOUS DAILY PRN
OUTPATIENT
Start: 2025-02-04

## 2025-01-28 RX ORDER — HEPARIN SODIUM (PORCINE) LOCK FLUSH IV SOLN 100 UNIT/ML 100 UNIT/ML
5 SOLUTION INTRAVENOUS
OUTPATIENT
Start: 2025-02-04

## 2025-01-28 NOTE — PROGRESS NOTES
Infusion Nursing Note:  Derik KACEY Quiros presents today for Therapeutic Phlebotomy.    Patient seen by provider today: No    Note: Pt arrives ambulatory to [facility]. Pt reports no changes in current baseline. Pt is checking his BP at home regularly due to elevated Bps.    Intravenous Access:  Peripheral IV placed in LEFT AC; 18g non-valved.     Treatment Conditions:  Hemoglobin   Date Value Ref Range Status   01/28/2025 15.8 13.3 - 17.7 g/dL Final     Per therapy plan parameters, proceeding with therapeutic phlebotomy 500ml. Needed to flush once during phlebotomy to re-initiate flow.     Pt tolerated well. VSS.    Discharge Plan:   Patient discharged in stable condition accompanied by: self.  Departure Mode: Ambulatory.      Tamiko Garcia RN

## 2025-02-03 ENCOUNTER — INFUSION THERAPY VISIT (OUTPATIENT)
Dept: INFUSION THERAPY | Facility: HOSPITAL | Age: 65
End: 2025-02-03
Attending: INTERNAL MEDICINE
Payer: MEDICARE

## 2025-02-03 VITALS
RESPIRATION RATE: 16 BRPM | HEART RATE: 70 BPM | SYSTOLIC BLOOD PRESSURE: 112 MMHG | TEMPERATURE: 97.5 F | DIASTOLIC BLOOD PRESSURE: 72 MMHG | OXYGEN SATURATION: 95 %

## 2025-02-03 DIAGNOSIS — D75.839 THROMBOCYTOSIS: ICD-10-CM

## 2025-02-03 DIAGNOSIS — D75.1 ERYTHROCYTOSIS: Primary | ICD-10-CM

## 2025-02-03 LAB
BASOPHILS # BLD AUTO: 0.2 10E3/UL (ref 0–0.2)
BASOPHILS NFR BLD AUTO: 2 %
EOSINOPHIL # BLD AUTO: 0.4 10E3/UL (ref 0–0.7)
EOSINOPHIL NFR BLD AUTO: 3 %
ERYTHROCYTE [DISTWIDTH] IN BLOOD BY AUTOMATED COUNT: 22.1 % (ref 10–15)
HCT VFR BLD AUTO: 48.8 % (ref 40–53)
HGB BLD-MCNC: 14.9 G/DL (ref 13.3–17.7)
IMM GRANULOCYTES # BLD: 0.1 10E3/UL
IMM GRANULOCYTES NFR BLD: 1 %
INTERPRETATION SERPL IEP-IMP: NORMAL
LAB TEST RESULTS REPORTED IN RPTPERIOD: NORMAL
LOCATION OF TASK: NORMAL
LYMPHOCYTES # BLD AUTO: 1.4 10E3/UL (ref 0.8–5.3)
LYMPHOCYTES NFR BLD AUTO: 10 %
MCH RBC QN AUTO: 23.4 PG (ref 26.5–33)
MCHC RBC AUTO-ENTMCNC: 30.5 G/DL (ref 31.5–36.5)
MCV RBC AUTO: 77 FL (ref 78–100)
MONOCYTES # BLD AUTO: 0.5 10E3/UL (ref 0–1.3)
MONOCYTES NFR BLD AUTO: 4 %
NEUTROPHILS # BLD AUTO: 11 10E3/UL (ref 1.6–8.3)
NEUTROPHILS NFR BLD AUTO: 81 %
NRBC # BLD AUTO: 0 10E3/UL
NRBC BLD AUTO-RTO: 0 /100
PLATELET # BLD AUTO: 198 10E3/UL (ref 150–450)
RBC # BLD AUTO: 6.36 10E6/UL (ref 4.4–5.9)
SEQUENCING METHOD PNL BLD/T: NORMAL
SPECIMEN TYPE: NORMAL
WBC # BLD AUTO: 13.5 10E3/UL (ref 4–11)

## 2025-02-03 PROCEDURE — 85004 AUTOMATED DIFF WBC COUNT: CPT

## 2025-02-03 PROCEDURE — 85048 AUTOMATED LEUKOCYTE COUNT: CPT

## 2025-02-03 PROCEDURE — 36415 COLL VENOUS BLD VENIPUNCTURE: CPT

## 2025-02-03 RX ORDER — HEPARIN SODIUM (PORCINE) LOCK FLUSH IV SOLN 100 UNIT/ML 100 UNIT/ML
5 SOLUTION INTRAVENOUS
OUTPATIENT
Start: 2025-02-04

## 2025-02-03 RX ORDER — HEPARIN SODIUM,PORCINE 10 UNIT/ML
5-20 VIAL (ML) INTRAVENOUS DAILY PRN
Status: DISCONTINUED | OUTPATIENT
Start: 2025-02-03 | End: 2025-02-03 | Stop reason: HOSPADM

## 2025-02-03 RX ORDER — HEPARIN SODIUM,PORCINE 10 UNIT/ML
5-20 VIAL (ML) INTRAVENOUS DAILY PRN
OUTPATIENT
Start: 2025-02-04

## 2025-02-03 NOTE — PROGRESS NOTES
Infusion Nursing Note:  Derik Quiros presents today for labs and phlebotomy.    Patient seen by provider today: No   present during visit today: Not Applicable.    Note: Tolerated phlebotomy well, offers no complaints.  Discharge to home ambulating per self..      Intravenous Access:  Labs drawn without difficulty.    Treatment Conditions:  Results reviewed, labs MET treatment parameters, ok to proceed with treatment.      Post Infusion Assessment:  Patient tolerated infusion without incident.  Access discontinued per protocol.       Discharge Plan:   Patient and/or family verbalized understanding of discharge instructions and all questions answered.      Belén Benavides RN

## 2025-02-10 ENCOUNTER — LAB (OUTPATIENT)
Dept: INFUSION THERAPY | Facility: HOSPITAL | Age: 65
End: 2025-02-10
Attending: INTERNAL MEDICINE
Payer: MEDICARE

## 2025-02-10 ENCOUNTER — PATIENT OUTREACH (OUTPATIENT)
Dept: ONCOLOGY | Facility: CLINIC | Age: 65
End: 2025-02-10

## 2025-02-10 VITALS
DIASTOLIC BLOOD PRESSURE: 78 MMHG | TEMPERATURE: 97.6 F | HEART RATE: 70 BPM | SYSTOLIC BLOOD PRESSURE: 140 MMHG | OXYGEN SATURATION: 97 % | RESPIRATION RATE: 16 BRPM

## 2025-02-10 DIAGNOSIS — D75.839 THROMBOCYTOSIS: ICD-10-CM

## 2025-02-10 DIAGNOSIS — D75.1 ERYTHROCYTOSIS: ICD-10-CM

## 2025-02-10 LAB
BASOPHILS # BLD AUTO: 0.3 10E3/UL (ref 0–0.2)
BASOPHILS NFR BLD AUTO: 2 %
EOSINOPHIL # BLD AUTO: 0.4 10E3/UL (ref 0–0.7)
EOSINOPHIL NFR BLD AUTO: 3 %
ERYTHROCYTE [DISTWIDTH] IN BLOOD BY AUTOMATED COUNT: 21.8 % (ref 10–15)
HCT VFR BLD AUTO: 44.3 % (ref 40–53)
HGB BLD-MCNC: 13.7 G/DL (ref 13.3–17.7)
IMM GRANULOCYTES # BLD: 0.1 10E3/UL
IMM GRANULOCYTES NFR BLD: 1 %
LYMPHOCYTES # BLD AUTO: 1.3 10E3/UL (ref 0.8–5.3)
LYMPHOCYTES NFR BLD AUTO: 9 %
MCH RBC QN AUTO: 23.4 PG (ref 26.5–33)
MCHC RBC AUTO-ENTMCNC: 30.9 G/DL (ref 31.5–36.5)
MCV RBC AUTO: 76 FL (ref 78–100)
MONOCYTES # BLD AUTO: 0.6 10E3/UL (ref 0–1.3)
MONOCYTES NFR BLD AUTO: 4 %
NEUTROPHILS # BLD AUTO: 11.3 10E3/UL (ref 1.6–8.3)
NEUTROPHILS NFR BLD AUTO: 81 %
NRBC # BLD AUTO: 0 10E3/UL
NRBC BLD AUTO-RTO: 0 /100
PLATELET # BLD AUTO: 197 10E3/UL (ref 150–450)
RBC # BLD AUTO: 5.86 10E6/UL (ref 4.4–5.9)
WBC # BLD AUTO: 13.9 10E3/UL (ref 4–11)

## 2025-02-10 PROCEDURE — 85025 COMPLETE CBC W/AUTO DIFF WBC: CPT

## 2025-02-10 PROCEDURE — 36415 COLL VENOUS BLD VENIPUNCTURE: CPT

## 2025-02-10 NOTE — PROGRESS NOTES
[facility]: Cancer Care                                                                                          Spoke with pt.  He saw Dr Spaulding's message and made an appt for a inperson visit on Thursday.  We will give him information on changing to interferon then.        Signature:  Rachelle Terrazas RN

## 2025-02-10 NOTE — PROGRESS NOTES
Infusion Nursing Note:  Derik Madsennick presents today for Phelbotomy - NOT NEEDED.    Patient seen by provider today: No   present during visit today: Not Applicable.    Note: Pt arrives to infusion today feeling well. Offers no new concerns or complaints.     Intravenous Access:  Labs drawn without difficulty.  Peripheral IV placed.    Treatment Conditions:  Lab Results   Component Value Date    HGB 13.7 02/10/2025    WBC 13.9 (H) 02/10/2025    ANEUTAUTO 11.3 (H) 02/10/2025     02/10/2025     Results reviewed, labs did NOT meet treatment parameters: Hgb. Message sent to Dr. Spaulding and RN[facility].    Post Infusion Assessment:  Site patent and intact, free from redness, edema or discomfort.  No evidence of extravasations.  Access discontinued per protocol.       Discharge Plan:   Patient will return 2/17 for next appointment.  Patient discharged in stable condition accompanied by: self.  Departure Mode: Ambulatory.      Elena Au RN

## 2025-02-12 NOTE — PROGRESS NOTES
Hematology Clinic note  In person     Problem List:  -Polycythemia vera  Collected 12/23/24           Resulting Agency   Final Diagnosis   BONE MARROW, LEFT POSTERIOR ILIAC CREST, ASPIRATION AND CORE BIOPSY:  -HYPERCELLULAR MARROW FOR AGE (80%), FEATURES CONSISTENT WITH CHRONIC MYELOPROLIFERATIVE DISORDER  -ERYTHROID HYPERPLASIA  -MEGAKARYOCYTIC HYPERPLASIA  -MODERATE RETICULIN FIBROSIS  -APPROXIMATELY 3% BLASTS  -PLEASE SEE COMMENT     PERIPHERAL BLOOD:  -ERYTHROCYTOSIS WITH INCREASE IN HEMOGLOBIN AND HEMATOCRIT  -MICROCYTIC RED BLOOD CELLS ARE PRESENT  -THROMBOCYTOSIS  -LEUKOCYTOSIS WITH ABSOLUTE NEUTROPHILIA, EOSINOPHILIA, AND BASOPHILIA  -APPROXIMATELY 1% BLASTS    Electronically signed by Nas Castro MD on 12/31/2024 at  4:35 PM   Comment   [facility]   Flow cytometric analysis of bone marrow (see case [id number], [facility]) shows no evidence of acute leukemia or malignant lymphoproliferative disorder.   The results of cytogenetic analysis and next-generation sequencing will be reported separately in electronic health record.             -History of PFO with stroke, status post closure Decades ago-open procedure  - Hypertension  - s/p left hip replacement 2011  -Left Hip fracture, status post open reduction internal fixation 5/14/2022  - History of colonic polyps, colonoscopy 3/20/24  -History of nonmelanoma skin cancer removed from cheek and back 2019  -History of liver hemangioma  -Anxiety  -Status post appendectomy  - Status post testicular varicocele surgery.  -Possible aspirin allergy, edema     History of Present Illness: Mr. Quiros is a 64-year-old man here for follow up of polycythemia vera.  He is accompanied by his wife, Leeanne.  He has had 8 therapeutic phlebotomies.  He is taking hydroxyurea 1500 mg MWF and 1000 mg SSTT.. He had a bone marrow biopsy 12/23/24, which showed moderate fibrosis, no increase in blasts. Karyotype is normal.  Myeloid molecular  studies shoe TP53 mutation.     He is tolerating the hydroxyurea without any problems.  He has some skin pruritus that waxes and wanes.  Sometimes his right foot is a bit numb, his primary care physician has [facility] for that.  His energy is okay, continues to do some exercising.    He has nocturia, and has been putting off having a prostate procedure.      PHYSICAL EXAMINATION:  BP (!) 162/77 (BP Location: Right arm, Patient Position: Sitting, Cuff Size: Adult Large)   Pulse 88   Temp 98.1  F (36.7  C) (Oral)   Resp 16   Wt 98.7 kg (217 lb 9.6 oz)   SpO2 98%   BMI 27.94 kg/m      General appearance:  Patient is 64 year old man in no acute distress.     HEENT:  No pallor, icterus.  Not otherwise examined      Labs:   Latest Reference Range & Units 02/03/25 10:24 02/10/25 09:40   WBC 4.0 - 11.0 10e3/uL 13.5 (H) 13.9 (H)   Hemoglobin 13.3 - 17.7 g/dL 14.9 13.7   Hematocrit 40.0 - 53.0 % 48.8 44.3   Platelet Count 150 - 450 10e3/uL 198 197   RBC Count 4.40 - 5.90 10e6/uL 6.36 (H) 5.86   MCV 78 - 100 fL 77 (L) 76 (L)   MCH 26.5 - 33.0 pg 23.4 (L) 23.4 (L)   MCHC 31.5 - 36.5 g/dL 30.5 (L) 30.9 (L)   RDW 10.0 - 15.0 % 22.1 (H) 21.8 (H)   % Neutrophils % 81 81   % Lymphocytes % 10 9   % Monocytes % 4 4   % Eosinophils % 3 3   % Basophils % 2 2   Absolute Basophils 0.0 - 0.2 10e3/uL 0.2 0.3 (H)   Absolute Eosinophils 0.0 - 0.7 10e3/uL 0.4 0.4   Absolute Immature Granulocytes <=0.4 10e3/uL 0.1 0.1   Absolute Lymphocytes 0.8 - 5.3 10e3/uL 1.4 1.3   Absolute Monocytes 0.0 - 1.3 10e3/uL 0.5 0.6   % Immature Granulocytes % 1 1   Absolute Neutrophils 1.6 - 8.3 10e3/uL 11.0 (H) 11.3 (H)   (H): Data is abnormally high  (L): Data is abnormally low    Interpretation    The previously identified pathogenic  JAK2 V617F mutation was again identified (VAF 68%).     In addition, a pathogenic variant in TP53 was also detected: TP53 [id number] (VAF 30%).     Assessment and Recommendation: -     Polycythemia vera, High  risk -we now have additional information from the bone marrow biopsy, which shows a TP53 mutation, with variant allele frequency (EF) of 30%.  The JAK2 V6 17F VF was 68%.  Extended myeloid panel showed no other genetic mutations.  The T p53 mutation places him at increased risk of conversion to acute myelogenous leukemia (AML), with 10-year overall survival about 70% and risk of AML at 10 years of about 19%.  This is based on prediction model that are still somewhat experimental,.  T p53 mutations are not very common in polycythemia vera, but clearly lead to an increased risk of progression to AML.  I discussed with him and his wife that the data is statistical, and for anyone individual we cannot reject exactly what is going to happen.  It is some people will develop mL earlier on, and somewhat never developed AML.  Some patients with P vera develop significant fibrosis and decreased blood counts rather than leukemia.      There is data suggesting that there may be a benefit to using Ropeginterferon alpha-2b (Besremi). In the  PROUD-PV and CONTINUATION-PV trials, Ropeginterferon was compared to hydroxyurea, and the Ropeginterferon showed complete hematologic response in 40 to 50% of patients, with ongoing decrease of JAK2 allele burden over time, unlike hydroxyurea.     I discussed possible toxicity, with 1 major concern is severe depression and suicidal ideation, which is rare, but serious.  There may be myalgias, flulike symptoms, and elevated liver enzymes.  Patient was given information about the medications.  He wishes to proceed.      Since there is a risk of progression to leukemia, and for anyone individual we cannot absolutely predict when and if that will happen, I will refer him to bone marrow transplant clinic for a consultation.      -Start ropeginterferon -Take 50 microgram subcutaneous subcutaneous) once every 14 days, then increase to 100 micrograms SQ once every 14 days, then increase to 150  "micrograms subcutaneous once every 14 days, then increase to 200 micrograms subcutaneous once.   -The Ropeginterferon comes in 500 mcg syringe size, I will order just for syringes with no refills to start, because I do not know how he will respond to it, more can be ordered with dose adjustments needed after the fourth dose.  - Continue same  the hydroxyurea at the same dose for the first 2 weeks, then decrease to 1000 mg p.o. daily for 2 weeks, then decrease to 500 mg p.o. daily x 2 weeks, then discontinue    - Continue clopidogrel 75 mg daily  - Continue weekly labs and therapeutic phlebotomy for hematocrit greater than 45; already scheduled    -Refer to BMT, Dr. Gilberto Allen    #Hypertension-continue on amlodipine      Keep appointment for 4/16 with me.      References:   Luz GASPAR et al, \"Ropeginterferon alpha-TB versus standard therapy for polycythemia vera (PROUD-PV and continuation PROUD-PV) : A randomized, noninferiority, phase 3 trial and its extension study, the Lancet, volume 7, March 2020    The most recent study, which also accounts for multiple mutations and chromosomes was recently published in the NEJM (Grnury and Melinda et al. 2018 [https://cancer.shai.ac.uk/mpn-multistage/]).     https://www.shai.ac.uk/tool/progmod/progmod/      Time: I spent a total of 60 minutes on the day of the visit. Please see the note for further information on patient assessment and treatment.      Inez Spaulding MD  Hematology          "

## 2025-02-13 ENCOUNTER — ONCOLOGY VISIT (OUTPATIENT)
Dept: ONCOLOGY | Facility: CLINIC | Age: 65
End: 2025-02-13
Attending: INTERNAL MEDICINE
Payer: MEDICARE

## 2025-02-13 VITALS
BODY MASS INDEX: 27.94 KG/M2 | DIASTOLIC BLOOD PRESSURE: 77 MMHG | WEIGHT: 217.6 LBS | RESPIRATION RATE: 16 BRPM | OXYGEN SATURATION: 98 % | SYSTOLIC BLOOD PRESSURE: 162 MMHG | TEMPERATURE: 98.1 F | HEART RATE: 88 BPM

## 2025-02-13 DIAGNOSIS — D75.1 ERYTHROCYTOSIS: Primary | ICD-10-CM

## 2025-02-13 PROCEDURE — G0463 HOSPITAL OUTPT CLINIC VISIT: HCPCS | Performed by: INTERNAL MEDICINE

## 2025-02-13 RX ORDER — ROPEGINTERFERON ALFA-2B 500 UG/ML
INJECTION SUBCUTANEOUS
Qty: 4 ML | Refills: 0 | Status: SHIPPED | OUTPATIENT
Start: 2025-02-13

## 2025-02-13 ASSESSMENT — PAIN SCALES - GENERAL: PAINLEVEL_OUTOF10: NO PAIN (0)

## 2025-02-13 NOTE — NURSING NOTE
"Oncology Rooming Note    February 13, 2025 7:01 AM   Derik Quiros is a 64 year old male who presents for:    Chief Complaint   Patient presents with    Oncology Clinic Visit     Polycythemia vera     Initial Vitals: BP (!) 162/77 (BP Location: Right arm, Patient Position: Sitting, Cuff Size: Adult Large)   Pulse 88   Temp 98.1  F (36.7  C) (Oral)   Resp 16   Wt 98.7 kg (217 lb 9.6 oz)   SpO2 98%   BMI 27.94 kg/m   Estimated body mass index is 27.94 kg/m  as calculated from the following:    Height as of 12/12/24: 1.88 m (6' 2\").    Weight as of this encounter: 98.7 kg (217 lb 9.6 oz). Body surface area is 2.27 meters squared.  No Pain (0) Comment: Data Unavailable   No LMP for male patient.  Allergies reviewed: Yes  Medications reviewed: Yes    Medications: Medication refills not needed today.  Pharmacy name entered into MovieLine:    [id number] [address]  [id number] - [place], [place] - [address] [place] [facility] [address]    Frailty Screening:   Is the patient here for a new oncology consult visit in cancer care? 2. No    PHQ9:  Did this patient require a PHQ9?: No      Clinical concerns: none       Ro Blanchard              "

## 2025-02-13 NOTE — LETTER
2/13/2025      Derik Quiros  [address]  [facility] [address]      Dear Colleague,    Thank you for referring your patient, Derik Quiros, to the [facility]. Please see a copy of my visit note below.    Hematology Clinic note  In person     Problem List:  -Polycythemia vera  Collected 12/23/24           Resulting Agency   Final Diagnosis   BONE MARROW, LEFT POSTERIOR ILIAC CREST, ASPIRATION AND CORE BIOPSY:  -HYPERCELLULAR MARROW FOR AGE (80%), FEATURES CONSISTENT WITH CHRONIC MYELOPROLIFERATIVE DISORDER  -ERYTHROID HYPERPLASIA  -MEGAKARYOCYTIC HYPERPLASIA  -MODERATE RETICULIN FIBROSIS  -APPROXIMATELY 3% BLASTS  -PLEASE SEE COMMENT     PERIPHERAL BLOOD:  -ERYTHROCYTOSIS WITH INCREASE IN HEMOGLOBIN AND HEMATOCRIT  -MICROCYTIC RED BLOOD CELLS ARE PRESENT  -THROMBOCYTOSIS  -LEUKOCYTOSIS WITH ABSOLUTE NEUTROPHILIA, EOSINOPHILIA, AND BASOPHILIA  -APPROXIMATELY 1% BLASTS    Electronically signed by Nas Castro MD on 12/31/2024 at  4:35 PM   Comment   [facility]   Flow cytometric analysis of bone marrow (see case [id number], [facility]) shows no evidence of acute leukemia or malignant lymphoproliferative disorder.   The results of cytogenetic analysis and next-generation sequencing will be reported separately in electronic health record.             -History of PFO with stroke, status post closure Decades ago-open procedure  - Hypertension  - s/p left hip replacement 2011  -Left Hip fracture, status post open reduction internal fixation 5/14/2022  - History of colonic polyps, colonoscopy 3/20/24  -History of nonmelanoma skin cancer removed from cheek and back 2019  -History of liver hemangioma  -Anxiety  -Status post appendectomy  - Status post testicular varicocele surgery.  -Possible aspirin allergy, edema     History of Present Illness: Mr. Quiros is a 64-year-old man here for follow up of polycythemia vera.  He is  accompanied by his wife, Leeanne.  He has had 8 therapeutic phlebotomies.  He is taking hydroxyurea 1500 mg MWF and 1000 mg SSTT.. He had a bone marrow biopsy 12/23/24, which showed moderate fibrosis, no increase in blasts. Karyotype is normal.  Myeloid molecular studies shoe TP53 mutation.     He is tolerating the hydroxyurea without any problems.  He has some skin pruritus that waxes and wanes.  Sometimes his right foot is a bit numb, his primary care physician has [facility] and EMG for that.  His energy is okay, continues to do some exercising.    He has nocturia, and has been putting off having a prostate procedure.      PHYSICAL EXAMINATION:  BP (!) 162/77 (BP Location: Right arm, Patient Position: Sitting, Cuff Size: Adult Large)   Pulse 88   Temp 98.1  F (36.7  C) (Oral)   Resp 16   Wt 98.7 kg (217 lb 9.6 oz)   SpO2 98%   BMI 27.94 kg/m      General appearance:  Patient is 64 year old man in no acute distress.     HEENT:  No pallor, icterus.  Not otherwise examined      Labs:   Latest Reference Range & Units 02/03/25 10:24 02/10/25 09:40   WBC 4.0 - 11.0 10e3/uL 13.5 (H) 13.9 (H)   Hemoglobin 13.3 - 17.7 g/dL 14.9 13.7   Hematocrit 40.0 - 53.0 % 48.8 44.3   Platelet Count 150 - 450 10e3/uL 198 197   RBC Count 4.40 - 5.90 10e6/uL 6.36 (H) 5.86   MCV 78 - 100 fL 77 (L) 76 (L)   MCH 26.5 - 33.0 pg 23.4 (L) 23.4 (L)   MCHC 31.5 - 36.5 g/dL 30.5 (L) 30.9 (L)   RDW 10.0 - 15.0 % 22.1 (H) 21.8 (H)   % Neutrophils % 81 81   % Lymphocytes % 10 9   % Monocytes % 4 4   % Eosinophils % 3 3   % Basophils % 2 2   Absolute Basophils 0.0 - 0.2 10e3/uL 0.2 0.3 (H)   Absolute Eosinophils 0.0 - 0.7 10e3/uL 0.4 0.4   Absolute Immature Granulocytes <=0.4 10e3/uL 0.1 0.1   Absolute Lymphocytes 0.8 - 5.3 10e3/uL 1.4 1.3   Absolute Monocytes 0.0 - 1.3 10e3/uL 0.5 0.6   % Immature Granulocytes % 1 1   Absolute Neutrophils 1.6 - 8.3 10e3/uL 11.0 (H) 11.3 (H)   (H): Data is abnormally high  (L): Data is abnormally  low    Interpretation    The previously identified pathogenic  JAK2 V617F mutation was again identified (VAF 68%).     In addition, a pathogenic variant in TP53 was also detected: TP53 [id number] (VAF 30%).     Assessment and Recommendation: -     Polycythemia vera, High risk -we now have additional information from the bone marrow biopsy, which shows a TP53 mutation, with variant allele frequency (EF) of 30%.  The JAK2 V6 17F VF was 68%.  Extended myeloid panel showed no other genetic mutations.  The T p53 mutation places him at increased risk of conversion to acute myelogenous leukemia (AML), with 10-year overall survival about 70% and risk of AML at 10 years of about 19%.  This is based on prediction model that are still somewhat experimental,.  T p53 mutations are not very common in polycythemia vera, but clearly lead to an increased risk of progression to AML.  I discussed with him and his wife that the data is statistical, and for anyone individual we cannot reject exactly what is going to happen.  It is some people will develop mL earlier on, and somewhat never developed AML.  Some patients with P vera develop significant fibrosis and decreased blood counts rather than leukemia.      There is data suggesting that there may be a benefit to using Ropeginterferon alpha-2b (Besremi). In the  PROUD-PV and CONTINUATION-PV trials, Ropeginterferon was compared to hydroxyurea, and the Ropeginterferon showed complete hematologic response in 40 to 50% of patients, with ongoing decrease of JAK2 allele burden over time, unlike hydroxyurea.     I discussed possible toxicity, with 1 major concern is severe depression and suicidal ideation, which is rare, but serious.  There may be myalgias, flulike symptoms, and elevated liver enzymes.  Patient was given information about the medications.  He wishes to proceed.      Since there is a risk of progression to leukemia, and for anyone individual we cannot absolutely  "predict when and if that will happen, I will refer him to bone marrow transplant clinic for a consultation.      -Start ropeginterferon -Take 50 microgram subcutaneous subcutaneous) once every 14 days, then increase to 100 micrograms SQ once every 14 days, then increase to 150 micrograms subcutaneous once every 14 days, then increase to 200 micrograms subcutaneous once.   -The Ropeginterferon comes in 500 mcg syringe size, I will order just for syringes with no refills to start, because I do not know how he will respond to it, more can be ordered with dose adjustments needed after the fourth dose.  - Continue same  the hydroxyurea at the same dose for the first 2 weeks, then decrease to 1000 mg p.o. daily for 2 weeks, then decrease to 500 mg p.o. daily x 2 weeks, then discontinue    - Continue clopidogrel 75 mg daily  - Continue weekly labs and therapeutic phlebotomy for hematocrit greater than 45; already scheduled    -Refer to BMT, Dr. Gilberto Allen    #Hypertension-continue on amlodipine      Keep appointment for 4/16 with me.      References:   Luz GASPAR et al, \"Ropeginterferon alpha-TB versus standard therapy for polycythemia vera (PROUD-PV and continuation PROUD-PV) : A randomized, noninferiority, phase 3 trial and its extension study, the Lancet, volume 7, March 2020    The most recent study, which also accounts for multiple mutations and chromosomes was recently published in the NEJM (Dilan and Melinda et al. 2018 [https://cancer.shai.ac.uk/mpn-multistage/]).     https://www.shai.ac.uk/tool/progmod/progmod/      Time: I spent a total of 60 minutes on the day of the visit. Please see the note for further information on patient assessment and treatment.      Inez Spaulding MD  Hematology              Again, thank you for allowing me to participate in the care of your patient.        Sincerely,        Inez Spaulding MD    Electronically signed"

## 2025-02-17 ENCOUNTER — LAB (OUTPATIENT)
Dept: INFUSION THERAPY | Facility: HOSPITAL | Age: 65
End: 2025-02-17
Attending: INTERNAL MEDICINE
Payer: MEDICARE

## 2025-02-17 DIAGNOSIS — D75.1 ERYTHROCYTOSIS: ICD-10-CM

## 2025-02-17 LAB
ALBUMIN SERPL BCG-MCNC: 4.4 G/DL (ref 3.5–5.2)
ALP SERPL-CCNC: 88 U/L (ref 40–150)
ALT SERPL W P-5'-P-CCNC: 23 U/L (ref 0–70)
ANION GAP SERPL CALCULATED.3IONS-SCNC: 10 MMOL/L (ref 7–15)
AST SERPL W P-5'-P-CCNC: 25 U/L (ref 0–45)
BASOPHILS # BLD AUTO: 0.3 10E3/UL (ref 0–0.2)
BASOPHILS NFR BLD AUTO: 2 %
BILIRUB SERPL-MCNC: 0.8 MG/DL
BUN SERPL-MCNC: 12.4 MG/DL (ref 8–23)
CALCIUM SERPL-MCNC: 8.9 MG/DL (ref 8.8–10.4)
CHLORIDE SERPL-SCNC: 105 MMOL/L (ref 98–107)
CREAT SERPL-MCNC: 0.75 MG/DL (ref 0.67–1.17)
EGFRCR SERPLBLD CKD-EPI 2021: >90 ML/MIN/1.73M2
EOSINOPHIL # BLD AUTO: 0.4 10E3/UL (ref 0–0.7)
EOSINOPHIL NFR BLD AUTO: 3 %
ERYTHROCYTE [DISTWIDTH] IN BLOOD BY AUTOMATED COUNT: 22.1 % (ref 10–15)
GLUCOSE SERPL-MCNC: 92 MG/DL (ref 70–99)
HCO3 SERPL-SCNC: 25 MMOL/L (ref 22–29)
HCT VFR BLD AUTO: 47.1 % (ref 40–53)
HGB BLD-MCNC: 14.3 G/DL (ref 13.3–17.7)
IMM GRANULOCYTES # BLD: 0.1 10E3/UL
IMM GRANULOCYTES NFR BLD: 1 %
LYMPHOCYTES # BLD AUTO: 1.2 10E3/UL (ref 0.8–5.3)
LYMPHOCYTES NFR BLD AUTO: 9 %
MCH RBC QN AUTO: 23.6 PG (ref 26.5–33)
MCHC RBC AUTO-ENTMCNC: 30.4 G/DL (ref 31.5–36.5)
MCV RBC AUTO: 78 FL (ref 78–100)
MONOCYTES # BLD AUTO: 0.6 10E3/UL (ref 0–1.3)
MONOCYTES NFR BLD AUTO: 4 %
NEUTROPHILS # BLD AUTO: 11.1 10E3/UL (ref 1.6–8.3)
NEUTROPHILS NFR BLD AUTO: 82 %
NRBC # BLD AUTO: 0 10E3/UL
NRBC BLD AUTO-RTO: 0 /100
PLATELET # BLD AUTO: 177 10E3/UL (ref 150–450)
POTASSIUM SERPL-SCNC: 4.4 MMOL/L (ref 3.4–5.3)
PROT SERPL-MCNC: 6.3 G/DL (ref 6.4–8.3)
RBC # BLD AUTO: 6.06 10E6/UL (ref 4.4–5.9)
SODIUM SERPL-SCNC: 140 MMOL/L (ref 135–145)
WBC # BLD AUTO: 13.6 10E3/UL (ref 4–11)

## 2025-02-17 PROCEDURE — 85025 COMPLETE CBC W/AUTO DIFF WBC: CPT

## 2025-02-17 PROCEDURE — 80053 COMPREHEN METABOLIC PANEL: CPT

## 2025-02-17 PROCEDURE — 36415 COLL VENOUS BLD VENIPUNCTURE: CPT

## 2025-02-17 NOTE — PROGRESS NOTES
Infusion Nursing Note:  Derik ERAZO Ishaan presents today for lab only.    Patient seen by provider today: No   present during visit today: Not Applicable.    Note: Patient care coordinator called today to clarify Simon's phlebotomy plan. Going forward we are to use the labs from week PRIOR to determine his need for phlebotomy. Phlebotomy parameters: last hgb greater than 14. Last week hgb was 13.7, therefore no phlebotomy today. Labs were drawn and plan of care reviewed with Simon, who is agreeable to plan.      Intravenous Access:  Labs drawn without difficulty.    Treatment Conditions:  Not Applicable.      Post Infusion Assessment:  Not Applicable.       Discharge Plan:   Discharge instructions reviewed with: Patient.  Patient and/or family verbalized understanding of discharge instructions and all questions answered.  AVS to patient via [employer].  Patient will return 2/24/25 for next appointment.   Patient discharged in stable condition accompanied by: self.  Departure Mode: Ambulatory.      Misael Sanderson RN

## 2025-02-18 ENCOUNTER — MEDICAL CORRESPONDENCE (OUTPATIENT)
Dept: TRANSPLANT | Facility: CLINIC | Age: 65
End: 2025-02-18
Payer: MEDICARE

## 2025-02-24 ENCOUNTER — INFUSION THERAPY VISIT (OUTPATIENT)
Dept: INFUSION THERAPY | Facility: HOSPITAL | Age: 65
End: 2025-02-24
Attending: INTERNAL MEDICINE
Payer: MEDICARE

## 2025-02-24 VITALS
HEART RATE: 77 BPM | RESPIRATION RATE: 16 BRPM | OXYGEN SATURATION: 98 % | DIASTOLIC BLOOD PRESSURE: 80 MMHG | TEMPERATURE: 98.1 F | SYSTOLIC BLOOD PRESSURE: 131 MMHG

## 2025-02-24 DIAGNOSIS — D75.1 ERYTHROCYTOSIS: ICD-10-CM

## 2025-02-24 DIAGNOSIS — D75.1 ERYTHROCYTOSIS: Primary | ICD-10-CM

## 2025-02-24 LAB
ALBUMIN SERPL BCG-MCNC: 4.3 G/DL (ref 3.5–5.2)
ALP SERPL-CCNC: 93 U/L (ref 40–150)
ALT SERPL W P-5'-P-CCNC: 22 U/L (ref 0–70)
ANION GAP SERPL CALCULATED.3IONS-SCNC: 8 MMOL/L (ref 7–15)
AST SERPL W P-5'-P-CCNC: 28 U/L (ref 0–45)
BASOPHILS # BLD AUTO: 0.2 10E3/UL (ref 0–0.2)
BASOPHILS NFR BLD AUTO: 2 %
BILIRUB SERPL-MCNC: 0.7 MG/DL
BUN SERPL-MCNC: 14.4 MG/DL (ref 8–23)
CALCIUM SERPL-MCNC: 9.4 MG/DL (ref 8.8–10.4)
CHLORIDE SERPL-SCNC: 105 MMOL/L (ref 98–107)
CREAT SERPL-MCNC: 0.7 MG/DL (ref 0.67–1.17)
EGFRCR SERPLBLD CKD-EPI 2021: >90 ML/MIN/1.73M2
EOSINOPHIL # BLD AUTO: 0.4 10E3/UL (ref 0–0.7)
EOSINOPHIL NFR BLD AUTO: 3 %
ERYTHROCYTE [DISTWIDTH] IN BLOOD BY AUTOMATED COUNT: 22.4 % (ref 10–15)
GLUCOSE SERPL-MCNC: 124 MG/DL (ref 70–99)
HCO3 SERPL-SCNC: 26 MMOL/L (ref 22–29)
HCT VFR BLD AUTO: 46.5 % (ref 40–53)
HGB BLD-MCNC: 14.4 G/DL (ref 13.3–17.7)
IMM GRANULOCYTES # BLD: 0.1 10E3/UL
IMM GRANULOCYTES NFR BLD: 1 %
LYMPHOCYTES # BLD AUTO: 1.3 10E3/UL (ref 0.8–5.3)
LYMPHOCYTES NFR BLD AUTO: 10 %
MCH RBC QN AUTO: 24 PG (ref 26.5–33)
MCHC RBC AUTO-ENTMCNC: 31 G/DL (ref 31.5–36.5)
MCV RBC AUTO: 78 FL (ref 78–100)
MONOCYTES # BLD AUTO: 0.4 10E3/UL (ref 0–1.3)
MONOCYTES NFR BLD AUTO: 3 %
NEUTROPHILS # BLD AUTO: 11.6 10E3/UL (ref 1.6–8.3)
NEUTROPHILS NFR BLD AUTO: 83 %
NRBC # BLD AUTO: 0 10E3/UL
NRBC BLD AUTO-RTO: 0 /100
PLATELET # BLD AUTO: 139 10E3/UL (ref 150–450)
POTASSIUM SERPL-SCNC: 4.5 MMOL/L (ref 3.4–5.3)
PROT SERPL-MCNC: 6.6 G/DL (ref 6.4–8.3)
RBC # BLD AUTO: 5.99 10E6/UL (ref 4.4–5.9)
SODIUM SERPL-SCNC: 139 MMOL/L (ref 135–145)
WBC # BLD AUTO: 14 10E3/UL (ref 4–11)

## 2025-02-24 PROCEDURE — 85041 AUTOMATED RBC COUNT: CPT

## 2025-02-24 PROCEDURE — 84295 ASSAY OF SERUM SODIUM: CPT

## 2025-02-24 PROCEDURE — 82310 ASSAY OF CALCIUM: CPT

## 2025-02-24 PROCEDURE — 82247 BILIRUBIN TOTAL: CPT

## 2025-02-24 PROCEDURE — 36415 COLL VENOUS BLD VENIPUNCTURE: CPT

## 2025-02-24 PROCEDURE — 85004 AUTOMATED DIFF WBC COUNT: CPT

## 2025-02-24 RX ORDER — HEPARIN SODIUM (PORCINE) LOCK FLUSH IV SOLN 100 UNIT/ML 100 UNIT/ML
5 SOLUTION INTRAVENOUS
OUTPATIENT
Start: 2025-03-03

## 2025-02-24 RX ORDER — HEPARIN SODIUM,PORCINE 10 UNIT/ML
5-20 VIAL (ML) INTRAVENOUS DAILY PRN
OUTPATIENT
Start: 2025-03-03

## 2025-02-24 NOTE — PROGRESS NOTES
Infusion Nursing Note:  Derik Quiros presents today for Labs and phlebotomy.    Patient seen by provider today: No   present during visit today: Not Applicable.    Note: Tolerated infusion well, offers no complaints.  Discharge to home ambulating..      Intravenous Access:  Peripheral IV placed.    Treatment Conditions:  Results reviewed, labs MET treatment parameters, ok to proceed with treatment.      Post Infusion Assessment:  Access discontinued per protocol.       Discharge Plan:   Patient and/or family verbalized understanding of discharge instructions and all questions answered.      Belén Benavides RN

## 2025-02-25 DIAGNOSIS — D75.1 ERYTHROCYTOSIS: Primary | ICD-10-CM

## 2025-02-27 ENCOUNTER — PATIENT OUTREACH (OUTPATIENT)
Dept: ONCOLOGY | Facility: CLINIC | Age: 65
End: 2025-02-27
Payer: MEDICARE

## 2025-02-27 NOTE — PROGRESS NOTES
[facility]: Cancer Care Follow-Up Note                                    Discussion with Patient:                                                      Spoke with Bill and assured him that writer spoke with Amy and orders to do the phlebotomy off of the same day lab had been put in by Dr Spaulding.  Pt was satisfied with that and will potentially get labs drawn the day before depending on how his Hgb responds to interferon.   Bill and writer also discussed next doses for interferon and hydrea.  He will adjust dose on 3/7/25.       Goals          General    Monitoring     Notes - Note created  12/12/2024  8:22 AM by Rachelle Terrazas RN    Goal Statement: I will use my clinic and care team resources as directed.  Date Goal set: 12/12/2024  Barriers: disease burden  Strengths: support, coping, motivation, health awareness, and involvement with care team  Date to Achieve By: ongoing  Patient expressed understanding of goal: Yes  Action steps to achieve this goal:  I will contact triage with new, worsening or uncontrolled symptoms.   I will call with difficulties of scheduling and/or transportation.   I will not send urgent or symptomatic messages through [employer].   I will contact scheduling to arrange or make changes in my appointments.                 Dates of Treatment:                                                      Infusion given in last 28 days       None            Assessment:                                                        No assessment indicated    Intervention/Education provided during outreach:                                                         Patient to follow up as scheduled at next appt  Patient to call/Pandoodle message with updates  Medication Change: Discussed medication change with patient  Confirmed patient has clinic and triage numbers    Signature:  Rachelle Terrazas RN

## 2025-03-03 ENCOUNTER — LAB (OUTPATIENT)
Dept: INFUSION THERAPY | Facility: HOSPITAL | Age: 65
End: 2025-03-03
Attending: INTERNAL MEDICINE
Payer: MEDICARE

## 2025-03-03 DIAGNOSIS — D75.1 ERYTHROCYTOSIS: Primary | ICD-10-CM

## 2025-03-03 LAB
ALBUMIN SERPL BCG-MCNC: 4.1 G/DL (ref 3.5–5.2)
ALP SERPL-CCNC: 93 U/L (ref 40–150)
ALT SERPL W P-5'-P-CCNC: 23 U/L (ref 0–70)
ANION GAP SERPL CALCULATED.3IONS-SCNC: 9 MMOL/L (ref 7–15)
AST SERPL W P-5'-P-CCNC: 26 U/L (ref 0–45)
BASOPHILS # BLD AUTO: 0.2 10E3/UL (ref 0–0.2)
BASOPHILS NFR BLD AUTO: 2 %
BILIRUB SERPL-MCNC: 0.5 MG/DL
BUN SERPL-MCNC: 15.1 MG/DL (ref 8–23)
CALCIUM SERPL-MCNC: 8.7 MG/DL (ref 8.8–10.4)
CHLORIDE SERPL-SCNC: 105 MMOL/L (ref 98–107)
CREAT SERPL-MCNC: 0.74 MG/DL (ref 0.67–1.17)
EGFRCR SERPLBLD CKD-EPI 2021: >90 ML/MIN/1.73M2
EOSINOPHIL # BLD AUTO: 0.4 10E3/UL (ref 0–0.7)
EOSINOPHIL NFR BLD AUTO: 3 %
ERYTHROCYTE [DISTWIDTH] IN BLOOD BY AUTOMATED COUNT: 22.1 % (ref 10–15)
GLUCOSE SERPL-MCNC: 120 MG/DL (ref 70–99)
HCO3 SERPL-SCNC: 23 MMOL/L (ref 22–29)
HCT VFR BLD AUTO: 40 % (ref 40–53)
HGB BLD-MCNC: 13.1 G/DL (ref 13.3–17.7)
IMM GRANULOCYTES # BLD: 0.1 10E3/UL
IMM GRANULOCYTES NFR BLD: 1 %
LYMPHOCYTES # BLD AUTO: 1.1 10E3/UL (ref 0.8–5.3)
LYMPHOCYTES NFR BLD AUTO: 10 %
MCH RBC QN AUTO: 24.6 PG (ref 26.5–33)
MCHC RBC AUTO-ENTMCNC: 32.8 G/DL (ref 31.5–36.5)
MCV RBC AUTO: 75 FL (ref 78–100)
MONOCYTES # BLD AUTO: 0.7 10E3/UL (ref 0–1.3)
MONOCYTES NFR BLD AUTO: 6 %
NEUTROPHILS # BLD AUTO: 8.4 10E3/UL (ref 1.6–8.3)
NEUTROPHILS NFR BLD AUTO: 78 %
NRBC # BLD AUTO: 0 10E3/UL
NRBC BLD AUTO-RTO: 0 /100
PLATELET # BLD AUTO: 179 10E3/UL (ref 150–450)
POTASSIUM SERPL-SCNC: 4.4 MMOL/L (ref 3.4–5.3)
PROT SERPL-MCNC: 6 G/DL (ref 6.4–8.3)
RBC # BLD AUTO: 5.32 10E6/UL (ref 4.4–5.9)
SODIUM SERPL-SCNC: 137 MMOL/L (ref 135–145)
WBC # BLD AUTO: 10.7 10E3/UL (ref 4–11)

## 2025-03-03 PROCEDURE — 82565 ASSAY OF CREATININE: CPT

## 2025-03-03 PROCEDURE — 85004 AUTOMATED DIFF WBC COUNT: CPT

## 2025-03-03 PROCEDURE — 36415 COLL VENOUS BLD VENIPUNCTURE: CPT

## 2025-03-03 PROCEDURE — 84155 ASSAY OF PROTEIN SERUM: CPT

## 2025-03-03 RX ORDER — HEPARIN SODIUM (PORCINE) LOCK FLUSH IV SOLN 100 UNIT/ML 100 UNIT/ML
5 SOLUTION INTRAVENOUS
OUTPATIENT
Start: 2025-03-10

## 2025-03-03 RX ORDER — HEPARIN SODIUM,PORCINE 10 UNIT/ML
5-20 VIAL (ML) INTRAVENOUS DAILY PRN
OUTPATIENT
Start: 2025-03-10

## 2025-03-06 ENCOUNTER — PATIENT OUTREACH (OUTPATIENT)
Dept: ONCOLOGY | Facility: CLINIC | Age: 65
End: 2025-03-06
Payer: MEDICARE

## 2025-03-06 ENCOUNTER — CARE COORDINATION (OUTPATIENT)
Dept: TRANSPLANT | Facility: CLINIC | Age: 65
End: 2025-03-06
Payer: MEDICARE

## 2025-03-06 DIAGNOSIS — Z94.81 BONE MARROW TRANSPLANT STATUS (H): ICD-10-CM

## 2025-03-06 DIAGNOSIS — D75.1 ERYTHROCYTOSIS: ICD-10-CM

## 2025-03-06 DIAGNOSIS — D75.1 ERYTHROCYTOSIS: Primary | ICD-10-CM

## 2025-03-06 LAB
ABO + RH BLD: NORMAL
BLD GP AB SCN SERPL QL: NEGATIVE
SPECIMEN EXP DATE BLD: NORMAL

## 2025-03-06 RX ORDER — ROPEGINTERFERON ALFA-2B 500 UG/ML
INJECTION SUBCUTANEOUS
Qty: 6 ML | Refills: 0 | Status: SHIPPED | OUTPATIENT
Start: 2025-03-06

## 2025-03-06 NOTE — PROGRESS NOTES
[facility]: Cancer Care Follow-Up Note                                    Discussion with Patient:                                                      Spoke with specialty pharmacy.Each Besremi syringe is 1ml.  Refill sent for a 90 day supply or 6ml.  They will check with insurance to see if they will cover 90 days.  If not, they will send 2 at a time.  MyChart sent to pt with update.            Goals          General    Monitoring     Notes - Note created  12/12/2024  8:22 AM by Rachelle Terrazas RN    Goal Statement: I will use my clinic and care team resources as directed.  Date Goal set: 12/12/2024  Barriers: disease burden  Strengths: support, coping, motivation, health awareness, and involvement with care team  Date to Achieve By: ongoing  Patient expressed understanding of goal: Yes  Action steps to achieve this goal:  I will contact triage with new, worsening or uncontrolled symptoms.   I will call with difficulties of scheduling and/or transportation.   I will not send urgent or symptomatic messages through [email or web address].   I will contact scheduling to arrange or make changes in my appointments.                 Dates of Treatment:                                                      Infusion given in last 28 days       None            Assessment:                                                        No assessment indicated    Intervention/Education provided during outreach:                                                           Patient to follow up as scheduled at next appt  Patient to call/Pixspan message with updates  Confirmed patient has clinic and triage numbers    Signature:  Rachelle Terrazas RN

## 2025-03-06 NOTE — PROGRESS NOTES
[facility]  RN Coordinator Pre-Visit Documentation      Derik Quiros is a 64 year old male who has been referred to the [facility] for hematopoietic cell transplant or immune effector cell therapy.      Referring MD Name: Inez Spaulding    Reason for referral: allo HSCT    Link to BMT & CT Program Algorithms      For allos only:    Previous HLA typing? No    Previous formal donor search? No    PRA needed? Yes    CMV IGG needed? Yes    and ABO needed? Yes    Potential family donors to type? Unknown    Need URD consents? Yes    For CAR-T Candidates Only:    Orders placed for virologies: N/A      All relevant clinical notes, labs, imaging, and pathology may be reviewed in Epic Bookmarks under name: Norah Grijalva RNCC      Patient Care Team         Relationship Specialty Notifications Start End    Dangelo Osorio MD PCP - General Internal Medicine  5/13/22     Phone: [phone] Fax: [phone]         [address] [place] [address]    Inez Spaulding MD MD Hematology & Oncology  12/11/24     Phone: [phone] Fax: [phone] [address] [facility] [address]    Rachelle Terrazas RN Specialty Care Coordinator Hematology & Oncology Admissions 12/12/24     Inez Spaulding MD Assigned Cancer Care Provider   12/23/24     Phone: [phone] Fax: [phone] [address] [facility] [address]              Norah Grijalva RN

## 2025-03-07 ENCOUNTER — OFFICE VISIT (OUTPATIENT)
Dept: TRANSPLANT | Facility: CLINIC | Age: 65
End: 2025-03-07
Attending: INTERNAL MEDICINE
Payer: MEDICARE

## 2025-03-07 ENCOUNTER — LAB (OUTPATIENT)
Dept: LAB | Facility: CLINIC | Age: 65
End: 2025-03-07
Attending: INTERNAL MEDICINE
Payer: MEDICARE

## 2025-03-07 VITALS
WEIGHT: 219.1 LBS | OXYGEN SATURATION: 97 % | DIASTOLIC BLOOD PRESSURE: 80 MMHG | HEART RATE: 78 BPM | BODY MASS INDEX: 28.12 KG/M2 | TEMPERATURE: 98.3 F | HEIGHT: 74 IN | RESPIRATION RATE: 16 BRPM | SYSTOLIC BLOOD PRESSURE: 131 MMHG

## 2025-03-07 DIAGNOSIS — D45 POLYCYTHEMIA VERA (H): ICD-10-CM

## 2025-03-07 DIAGNOSIS — D45 MYELOFIBROSIS TRANSFORMED FROM POLYCYTHEMIA VERA (H): ICD-10-CM

## 2025-03-07 DIAGNOSIS — D75.81 MYELOFIBROSIS TRANSFORMED FROM POLYCYTHEMIA VERA (H): ICD-10-CM

## 2025-03-07 DIAGNOSIS — D75.81 MYELOFIBROSIS TRANSFORMED FROM POLYCYTHEMIA VERA (H): Primary | ICD-10-CM

## 2025-03-07 DIAGNOSIS — R35.1 BENIGN PROSTATIC HYPERPLASIA WITH NOCTURIA: ICD-10-CM

## 2025-03-07 DIAGNOSIS — M96.662 FRACTURE OF FEMUR FOLLOWING INSERTION OF ORTHOPEDIC IMPLANT, JOINT PROSTHESIS, OR BONE PLATE, LEFT LEG: ICD-10-CM

## 2025-03-07 DIAGNOSIS — G62.89 OTHER POLYNEUROPATHY: ICD-10-CM

## 2025-03-07 DIAGNOSIS — N40.1 BENIGN PROSTATIC HYPERPLASIA WITH NOCTURIA: ICD-10-CM

## 2025-03-07 DIAGNOSIS — D75.1 ERYTHROCYTOSIS: ICD-10-CM

## 2025-03-07 DIAGNOSIS — D45 MYELOFIBROSIS TRANSFORMED FROM POLYCYTHEMIA VERA (H): Primary | ICD-10-CM

## 2025-03-07 DIAGNOSIS — Z94.81 BONE MARROW TRANSPLANT STATUS (H): ICD-10-CM

## 2025-03-07 LAB
ALBUMIN SERPL BCG-MCNC: 4.4 G/DL (ref 3.5–5.2)
ALP SERPL-CCNC: 101 U/L (ref 40–150)
ALT SERPL W P-5'-P-CCNC: 28 U/L (ref 0–70)
ANION GAP SERPL CALCULATED.3IONS-SCNC: 9 MMOL/L (ref 7–15)
AST SERPL W P-5'-P-CCNC: 28 U/L (ref 0–45)
BASOPHILS # BLD AUTO: 0.2 10E3/UL (ref 0–0.2)
BASOPHILS NFR BLD AUTO: 2 %
BILIRUB SERPL-MCNC: 0.6 MG/DL
BUN SERPL-MCNC: 13.6 MG/DL (ref 8–23)
CALCIUM SERPL-MCNC: 9.3 MG/DL (ref 8.8–10.4)
CHLORIDE SERPL-SCNC: 106 MMOL/L (ref 98–107)
CREAT SERPL-MCNC: 0.69 MG/DL (ref 0.67–1.17)
EGFRCR SERPLBLD CKD-EPI 2021: >90 ML/MIN/1.73M2
EOSINOPHIL # BLD AUTO: 0.3 10E3/UL (ref 0–0.7)
EOSINOPHIL NFR BLD AUTO: 2 %
ERYTHROCYTE [DISTWIDTH] IN BLOOD BY AUTOMATED COUNT: 22.2 % (ref 10–15)
GLUCOSE SERPL-MCNC: 108 MG/DL (ref 70–99)
HCO3 SERPL-SCNC: 24 MMOL/L (ref 22–29)
HCT VFR BLD AUTO: 44.1 % (ref 40–53)
HGB BLD-MCNC: 13.8 G/DL (ref 13.3–17.7)
IMM GRANULOCYTES # BLD: 0.1 10E3/UL
IMM GRANULOCYTES NFR BLD: 1 %
LDH SERPL L TO P-CCNC: 169 U/L (ref 0–250)
LYMPHOCYTES # BLD AUTO: 1.1 10E3/UL (ref 0.8–5.3)
LYMPHOCYTES NFR BLD AUTO: 8 %
MCH RBC QN AUTO: 24.2 PG (ref 26.5–33)
MCHC RBC AUTO-ENTMCNC: 31.3 G/DL (ref 31.5–36.5)
MCV RBC AUTO: 77 FL (ref 78–100)
MONOCYTES # BLD AUTO: 0.5 10E3/UL (ref 0–1.3)
MONOCYTES NFR BLD AUTO: 4 %
NEUTROPHILS # BLD AUTO: 10.8 10E3/UL (ref 1.6–8.3)
NEUTROPHILS NFR BLD AUTO: 84 %
NRBC # BLD AUTO: 0 10E3/UL
NRBC BLD AUTO-RTO: 0 /100
PLATELET # BLD AUTO: 230 10E3/UL (ref 150–450)
POTASSIUM SERPL-SCNC: 4.1 MMOL/L (ref 3.4–5.3)
PROT SERPL-MCNC: 6.7 G/DL (ref 6.4–8.3)
RBC # BLD AUTO: 5.71 10E6/UL (ref 4.4–5.9)
SODIUM SERPL-SCNC: 139 MMOL/L (ref 135–145)
WBC # BLD AUTO: 13 10E3/UL (ref 4–11)

## 2025-03-07 PROCEDURE — 86828 HLA CLASS I&II ANTIBODY QUAL: CPT

## 2025-03-07 PROCEDURE — 36415 COLL VENOUS BLD VENIPUNCTURE: CPT

## 2025-03-07 PROCEDURE — 86644 CMV ANTIBODY: CPT

## 2025-03-07 PROCEDURE — 1126F AMNT PAIN NOTED NONE PRSNT: CPT | Performed by: INTERNAL MEDICINE

## 2025-03-07 PROCEDURE — 86900 BLOOD TYPING SEROLOGIC ABO: CPT

## 2025-03-07 PROCEDURE — 83615 LACTATE (LD) (LDH) ENZYME: CPT

## 2025-03-07 PROCEDURE — 82040 ASSAY OF SERUM ALBUMIN: CPT

## 2025-03-07 PROCEDURE — 85025 COMPLETE CBC W/AUTO DIFF WBC: CPT

## 2025-03-07 PROCEDURE — 99417 PROLNG OP E/M EACH 15 MIN: CPT | Performed by: INTERNAL MEDICINE

## 2025-03-07 PROCEDURE — 3079F DIAST BP 80-89 MM HG: CPT | Performed by: INTERNAL MEDICINE

## 2025-03-07 PROCEDURE — 99215 OFFICE O/P EST HI 40 MIN: CPT | Performed by: INTERNAL MEDICINE

## 2025-03-07 PROCEDURE — G2211 COMPLEX E/M VISIT ADD ON: HCPCS | Performed by: INTERNAL MEDICINE

## 2025-03-07 PROCEDURE — 82310 ASSAY OF CALCIUM: CPT

## 2025-03-07 PROCEDURE — 85018 HEMOGLOBIN: CPT

## 2025-03-07 PROCEDURE — 80053 COMPREHEN METABOLIC PANEL: CPT

## 2025-03-07 PROCEDURE — G0463 HOSPITAL OUTPT CLINIC VISIT: HCPCS | Performed by: INTERNAL MEDICINE

## 2025-03-07 PROCEDURE — 86850 RBC ANTIBODY SCREEN: CPT

## 2025-03-07 PROCEDURE — 3075F SYST BP GE 130 - 139MM HG: CPT | Performed by: INTERNAL MEDICINE

## 2025-03-07 RX ORDER — AMLODIPINE BESYLATE 10 MG/1
1 TABLET ORAL
COMMUNITY
Start: 2024-12-13

## 2025-03-07 ASSESSMENT — PAIN SCALES - GENERAL: PAINLEVEL_OUTOF10: NO PAIN (0)

## 2025-03-07 NOTE — LETTER
3/7/2025      Derik Quiros  [address]  [facility] [address]      Dear Colleague,    Thank you for referring your patient, Derik Quiros, to the [facility]OD [facility]ARROW [facility]ANSPL[facility] PRO[facility] [place]. Please see a copy of my visit note below.           BMT Consultation    Derik Quiros is a 64 year old male referred by Dr. Spaulding for polycythemia vera with secondary myelofibrosis and consideration of transplantation.    Oncology History    No history exists.         Hematologic history:  Incidentally diagnosed with splenomegaly when presented with pruritus and neuropathy and mainly back pain, underwent a MRI that showed cystic lesion in the liver and kidney. Underwent US on 4/30/24 showing splenomegaly of 16.5cm. Underwent labwork with PCP in 12/2024 and found to have leukocytosis, erythrocytosis that had worsened (of note,had some elevated erythrocytosis in 2022), was referred to see Hematology. JAK2 was positive and he was initiated on phlebotomy, then Hydroxyurea. Bone marrow biopsy showed G2 reticulin fibrosis, with 3% blasts. 80% cellularity with erythrocytosis, thrombocytosis and leukocytosis with 68% JAK2 VAF and 30% VAF TP53, normal cytogenetics.     Date Treatment Response Toxicities/Complications   12/13/24 Hydroxyurea Count reduction    2/20/25 BesRemi                        HPI:  Please see my entry above for hematologic history.      He presented initially in April 2024 with left flank pain radiating to the back, he underwent MRI to evaluate for radiculopathy as a cause of his back pain and was found to have splenomegaly of 16.5 cm.  An ultrasound was done to confirm this which showed 16.5 cm below splenomegaly.  His hemoglobin at that point was 17.5, however he did not have any additional workup.  He presented to his primary care physician who repeated his abdominal ultrasound which again showed 16.1 cm splenomegaly, but now with a hemoglobin of 19.5.  During  this past year he is also suffering with pruritus especially when taking showers, but denies any fevers chills weight loss or night sweats.  He was for to see hematology and saw Dr. Spaulding 2024 and was supposed subsequently diagnosed with polycythemia vera, however bone marrow biopsy showed 2+ reticulin fibrosis concerning for development of myelofibrosis.       REVIEW OF SYSTEMS  Review of Systems   Constitutional:  Negative for chills, diaphoresis, fever, malaise/fatigue and weight loss.   HENT:  Negative for congestion, nosebleeds, sinus pain and sore throat.    Eyes:  Negative for double vision.   Cardiovascular:  Negative for chest pain, palpitations and claudication.   Gastrointestinal:  Negative for heartburn, nausea and vomiting.   Genitourinary:  Positive for frequency.   Skin:  Positive for itching. Negative for rash.       Family History: mother,  age 60 ovarian cancer, father-bladder cancer, tobacco use as cause.    Siblings-1 sister with intracerebral hemorrhage, hypertension.    Social History: no smoking,  alcohol-6-7 drinks per week,  He is , accompanied by his wife, Leeanne.  Retired  from [employer].    Past Medical History:   Diagnosis Date     Polycythemia vera (H) 2024       Past Surgical History:   Procedure Laterality Date     OPEN REDUCTION INTERNAL FIXATION FEMUR MIDSHAFT Left 2022    Procedure: OPEN REDUCTION INTERNAL FIXATION OF LEFT PREIPROSTHETIC FEMUR FRACTURE;  Surgeon: Srinivasan Soto MD;  Location: [facility] Main OR       No family history on file.    Social History     Tobacco Use     Smoking status: Never     Smokeless tobacco: Never         Allergies   Allergen Reactions     Aspirin Hives and Swelling     Edema.   Has taken ibuprofen in the past and tolerated.     Contrast Dye Hives     Hydrocodone Hives     Welts  Norco (hydrocodone-acetaminophen), prescribed 2022 as alternative to oxycodone after developing similar reaction.   Has tolerated  "acetaminophen in the past.      Iodinated Diagnostic Agents [Iodinated Contrast Media] Hives     Oxycodone Hives     Welts  Prescribed Feb 2022.     Phenobarbital Hives     Codeine Rash        Current Outpatient Medications   Medication Sig Dispense Refill     amLODIPine (NORVASC) 10 MG tablet Take 1 tablet by mouth daily at 2 pm.       Cholecalciferol (VITAMIN D-3 PO) Take 200 mg by mouth daily.       clonazePAM (KLONOPIN) 1 MG tablet Take 0.5 mg by mouth 2 times daily. Reports taking 0.5mg not 1 mg       clopidogrel (PLAVIX) 75 MG tablet Take 1 tablet (75 mg) by mouth daily. 90 tablet 3     hydroxyurea (HYDREA) 500 MG capsule Take 2 capsules (1,000 mg) by mouth daily 180 capsule 3     ropeginterferon [id number] (BESREMI) 500 MCG/ML SOSY Take 50 microgram subcutaneous subcutaneous) once every 14 days, then increase to 100 micrograms SQ once every 14 days, then increase to 150 micrograms subcutaneous once every 14 days, then increase to 200 micrograms subcutaneous once. Syringe comes only as a 500 mcg prefilled syringe. See package insert for instructions on how to give smaller doses. 6 mL 0     tamsulosin (FLOMAX) 0.4 MG capsule Take 0.4 mg by mouth daily       vitamin D3 (CHOLECALCIFEROL) 50 mcg (2000 units) tablet Take 1 tablet by mouth daily         KPS:  90    Physical Exam:   /80 (BP Location: Right arm, Patient Position: Sitting, Cuff Size: Adult Regular)   Pulse 78   Temp 98.3  F (36.8  C) (Oral)   Resp 16   Ht 1.88 m (6' 2\")   Wt 99.4 kg (219 lb 1.6 oz)   SpO2 97%   BMI 28.13 kg/m      Physical Exam  Vitals reviewed.   Constitutional:       Appearance: Normal appearance.   HENT:      Head: Normocephalic and atraumatic.      Nose: Nose normal.      Mouth/Throat:      Mouth: Mucous membranes are moist.   Eyes:      Pupils: Pupils are equal, round, and reactive to light.   Cardiovascular:      Rate and Rhythm: Normal rate and regular rhythm.      Pulses: Normal pulses.      Heart sounds: Normal " heart sounds. No murmur heard.  Pulmonary:      Effort: Pulmonary effort is normal. No respiratory distress.      Breath sounds: Normal breath sounds.   Abdominal:      General: Abdomen is flat. Bowel sounds are normal.      Palpations: Abdomen is soft. There is splenomegaly. There is no hepatomegaly, mass or pulsatile mass.      Comments: 1cm palpated spleen below costophrenic margin.    Musculoskeletal:         General: No swelling. Normal range of motion.      Cervical back: Normal range of motion and neck supple. No rigidity.   Skin:     General: Skin is warm and dry.   Neurological:      Mental Status: He is alert and oriented to person, place, and time. Mental status is at baseline.   Psychiatric:         Mood and Affect: Mood normal.         Behavior: Behavior normal.         Thought Content: Thought content normal.         Judgment: Judgment normal.            TODAY'S ASSESSMENT BY SYSTEMS       ASSESSMENT AND PLAN:  Developing post-PV Myelofibrosis.   - Recently diagnosed Pvera based on elevated Hgb, bone marrow showing polycythemia in all three precursors, additional splenomegaly and fibrosis G2  - Does not meet full criteria of Post-PV MF due to lack of consitutional symptoms such as fevers, chills or weight loss or cytopenias, though likely will develop.   - Bone marrow biopsy showing 3% blasts, 1% peripheral blasts.   - NGS showing TP53 with VAF of 30%.   - Mysec-PM is Intermediate-1 based on blood counts.   - Risk of progression to acute leukemia is 10-30% in 5 years based on single hit (<50% VAF or single mutation) https://ashpublications.org/blood/article/142/Supplement%201/3160/647120/TP53-mutations-and-Their-Impact-on-Survival-in  - He will likely need to undergo transplant at some point, however given he doesn't have cytopenias or peripheral blasts, and blasts are <5% in bone marrow, we can hold off at this time.  - However, if his TP53 VAF increases to 50% or new mutation is gained, this would  affect his risk of progression and need for transplant.   - Recommend surveillance bone marrow biopsies every 3-6 months to evaluate progression of TP53 mutation, bone marrow blasts and fibrosis.   - If definitive evidence of secondary myelofibrosis from Pvera, recommend consideration of use of Ruxolitinib which could help reduce the proliferative aspect of his disease.       LIVER  History of probable hepatic cyst vs cavernous hemangioma.     GASTROINTESTINAL  History of colon polyps, no evidence of colon cancer.     RENAL  History of nocturia, BPH, with prior PSA in 9/2022 at 1.4  On Tamsulosin 0.4mg    CARDIOVASCULAR  No history of cardiovascular disease. Prior stroke due to PFO, with closure now.   Currently on Plavix due to prior stroke and JAK2 positive mutated MPN.     ENDOCRINE  History of Vitamin D deficiency, on Vitamin D therapy.     MUSCULOSKELETAL  Prior TKAs in 2022.     NEUROLOGIC  History of CVA, now resolved, from PFO s/p closure.   On long term Clopidogrel due to newly diagnosed JAK2+ MPN.   History of polyneuropathy, could be secondary to JAK2 mutated MPN involving small nerve fibers, pending EMG.     PSYCHIATRIC  History of anxiety, on Clonazepam.     SOCIAL  Retired  with [employer]. Lives in [place] with wife.     Today I discussed the above diagnosis with Derik Quiros. We discussed the natural history of the disease and treatment to date. We reviewed all the available diagnostic information. We talked about general indications of transplant that include patient, disease and donor factors.     We also reviewed again the role of allogeneic stem cell transplantation in this disease. I described the process of work up of a potential recipient to confirm good organ function and reserve, reassess disease and decide on risks/benefit. We also discussed in great detail the process of the actual transplant itself, with discussion of different donor stem cell options. We discussed the role  of the preparative regimen to clear any residual disease and to ablate the bone marrow, followed by infusion of the HSC graft. We discussed the expected toxicities and side effects, including organ toxicity, expected pancytopenia and need for transfusion support, risk of infection or bleeding, possibility of delayed or non-engraftment, and the risk of treatment related mortality (10-20%). We also discussed the risk of acute or chronic GVHD (overall ~50%, 10-20% for severe GVH) and the need for immunosuppression within the first 100 days and perhaps beyond, depending on GVHD status then. I also mentioned the possibility of relapse which I would estimate at 30-40%. We discussed supportive care, needing a line with associated complications, and the critical need for a caregiver and proximity to [facility].    All of their questions were answered to their satisfaction.      Known issues that I take into account for medical decisions, with salient changes to the plan considering these complexities noted above.    Patient Active Problem List   Diagnosis     Fracture of femur following insertion of orthopedic implant, joint prosthesis, or bone plate, left leg     polycythemia vera       Today's summary:   Given expected mortality with transplant is 10-20% currently, but his current trajectory of TP53 mutated P-vera (likely becoming secondary MF but doesn't meet formal criteria) but his current mortality from P-vera with TP53 is 25% at 5 years, would hold off on transplant for the next several months until we identify     I spent 90 minutes in the care of this patient today, which included time necessary for preparation for the visit, obtaining history, ordering medications/tests/procedures as medically indicated, review of pertinent medical literature, counseling of the patient, communication of recommendations to the care team, and documentation time.    The longitudinal plan of care for the diagnosis(es)/condition(s) as  documented were addressed during this visit. Due to the added complexity in care, I will continue to support Bill in the subsequent management and with ongoing continuity of care.    Keanu Allen MD  March 8, 2025        ------------------------------------------------------------------------------------------------------------------------------------------------    Patient Care Team         Relationship Specialty Notifications Start End    Dangelo Osorio MD PCP - General Internal Medicine  5/13/22     Phone: [phone] Fax: [phone]         [address] [place] [address]    Inez Spaulding MD MD Hematology & Oncology  12/11/24     Phone: [phone] Fax: [phone] [address] [facility] [address]    Rachelle Terrazas, RN Specialty Care Coordinator Hematology & Oncology Admissions 12/12/24     Inez Spaulding MD Assigned Cancer Care Provider   12/23/24     Phone: [phone] Fax: [phone] [address] [facility] [address]               Again, thank you for allowing me to participate in the care of your patient.        Sincerely,        Keanu Allen MD    Electronically signed

## 2025-03-07 NOTE — NURSING NOTE
Chief Complaint   Patient presents with    Labs Only     Labs drawn via  by RN in lab       Labs collected from venipuncture by RN.     Rosa Wang RN

## 2025-03-07 NOTE — NURSING NOTE
"Oncology Rooming Note    March 7, 2025 8:14 AM   Derik Quiros is a 64 year old male who presents for:    Chief Complaint   Patient presents with    Oncology Clinic Visit     Polycythemia vera     Initial Vitals: /80 (BP Location: Right arm, Patient Position: Sitting, Cuff Size: Adult Regular)   Pulse 78   Temp 98.3  F (36.8  C) (Oral)   Resp 16   Ht 1.88 m (6' 2\")   Wt 99.4 kg (219 lb 1.6 oz)   SpO2 97%   BMI 28.13 kg/m   Estimated body mass index is 28.13 kg/m  as calculated from the following:    Height as of this encounter: 1.88 m (6' 2\").    Weight as of this encounter: 99.4 kg (219 lb 1.6 oz). Body surface area is 2.28 meters squared.  No Pain (0) Comment: Data Unavailable   No LMP for male patient.  Allergies reviewed: Yes  Medications reviewed: Yes    Medications: Medication refills not needed today.  Pharmacy name entered into [facility]:    [id number] - [place], [place] - [address]  [id number] - [place], [place] - [address] AT [facility] [address]  [facility] - [place], [place] - [address]    Frailty Screening:   Is the patient here for a new oncology consult visit in cancer care? 2. No    PHQ9:  Did this patient require a PHQ9?: No      Clinical concerns: has a few questions written down       Loki Onofre            "

## 2025-03-08 ASSESSMENT — ENCOUNTER SYMPTOMS
CHILLS: 0
PALPITATIONS: 0
WEIGHT LOSS: 0
SINUS PAIN: 0
FEVER: 0
VOMITING: 0
DOUBLE VISION: 0
HEARTBURN: 0
CLAUDICATION: 0
SORE THROAT: 0
NAUSEA: 0
FREQUENCY: 1
DIAPHORESIS: 0

## 2025-03-08 NOTE — PROGRESS NOTES
BMT Consultation    Derik Quiros is a 64 year old male referred by Dr. Spaulding for polycythemia vera with secondary myelofibrosis and consideration of transplantation.    Oncology History    No history exists.         Hematologic history:  Incidentally diagnosed with splenomegaly when presented with pruritus and neuropathy and mainly back pain, underwent a MRI that showed cystic lesion in the liver and kidney. Underwent US on 4/30/24 showing splenomegaly of 16.5cm. Underwent labwork with PCP in 12/2024 and found to have leukocytosis, erythrocytosis that had worsened (of note,had some elevated erythrocytosis in 2022), was referred to see Hematology. JAK2 was positive and he was initiated on phlebotomy, then Hydroxyurea. Bone marrow biopsy showed G2 reticulin fibrosis, with 3% blasts. 80% cellularity with erythrocytosis, thrombocytosis and leukocytosis with 68% JAK2 VAF and 30% VAF TP53, normal cytogenetics.     Date Treatment Response Toxicities/Complications   12/13/24 Hydroxyurea Count reduction    2/20/25 BesRemi                        HPI:  Please see my entry above for hematologic history.      He presented initially in April 2024 with left flank pain radiating to the back, he underwent MRI to evaluate for radiculopathy as a cause of his back pain and was found to have splenomegaly of 16.5 cm.  An ultrasound was done to confirm this which showed 16.5 cm below splenomegaly.  His hemoglobin at that point was 17.5, however he did not have any additional workup.  He presented to his primary care physician who repeated his abdominal ultrasound which again showed 16.1 cm splenomegaly, but now with a hemoglobin of 19.5.  During this past year he is also suffering with pruritus especially when taking showers, but denies any fevers chills weight loss or night sweats.  He was for to see hematology and saw Dr. Spaulding December 2024 and was supposed subsequently diagnosed with polycythemia vera, however bone  marrow biopsy showed 2+ reticulin fibrosis concerning for development of myelofibrosis.       REVIEW OF SYSTEMS  Review of Systems   Constitutional:  Negative for chills, diaphoresis, fever, malaise/fatigue and weight loss.   HENT:  Negative for congestion, nosebleeds, sinus pain and sore throat.    Eyes:  Negative for double vision.   Cardiovascular:  Negative for chest pain, palpitations and claudication.   Gastrointestinal:  Negative for heartburn, nausea and vomiting.   Genitourinary:  Positive for frequency.   Skin:  Positive for itching. Negative for rash.       Family History: mother,  age 60 ovarian cancer, father-bladder cancer, tobacco use as cause.    Siblings-1 sister with intracerebral hemorrhage, hypertension.    Social History: no smoking,  alcohol-6-7 drinks per week,  He is , accompanied by his wife, Leeanne.  Retired  from [employer].    Past Medical History:   Diagnosis Date    Polycythemia vera (H) 2024       Past Surgical History:   Procedure Laterality Date    OPEN REDUCTION INTERNAL FIXATION FEMUR MIDSHAFT Left 2022    Procedure: OPEN REDUCTION INTERNAL FIXATION OF LEFT PREIPROSTHETIC FEMUR FRACTURE;  Surgeon: Srinivasan Soto MD;  Location: [facility] Main OR       No family history on file.    Social History     Tobacco Use    Smoking status: Never    Smokeless tobacco: Never         Allergies   Allergen Reactions    Aspirin Hives and Swelling     Edema.   Has taken ibuprofen in the past and tolerated.    Contrast Dye Hives    Hydrocodone Hivfrankie Logan  Norco (hydrocodone-acetaminophen), prescribed 2022 as alternative to oxycodone after developing similar reaction.   Has tolerated acetaminophen in the past.     Iodinated Diagnostic Agents [Iodinated Contrast Media] Hives    Oxycodone Hives     Welts  Prescribed 2022.    Phenobarbital Hives    Codeine Rash        Current Outpatient Medications   Medication Sig Dispense Refill    amLODIPine (NORVASC) 10 MG tablet  "Take 1 tablet by mouth daily at 2 pm.      Cholecalciferol (VITAMIN D-3 PO) Take 200 mg by mouth daily.      clonazePAM (KLONOPIN) 1 MG tablet Take 0.5 mg by mouth 2 times daily. Reports taking 0.5mg not 1 mg      clopidogrel (PLAVIX) 75 MG tablet Take 1 tablet (75 mg) by mouth daily. 90 tablet 3    hydroxyurea (HYDREA) 500 MG capsule Take 2 capsules (1,000 mg) by mouth daily 180 capsule 3    ropeginterferon ehic-1p-tuyt (BESREMI) 500 MCG/ML SOSY Take 50 microgram subcutaneous subcutaneous) once every 14 days, then increase to 100 micrograms SQ once every 14 days, then increase to 150 micrograms subcutaneous once every 14 days, then increase to 200 micrograms subcutaneous once. Syringe comes only as a 500 mcg prefilled syringe. See package insert for instructions on how to give smaller doses. 6 mL 0    tamsulosin (FLOMAX) 0.4 MG capsule Take 0.4 mg by mouth daily      vitamin D3 (CHOLECALCIFEROL) 50 mcg (2000 units) tablet Take 1 tablet by mouth daily         KPS:  90    Physical Exam:   /80 (BP Location: Right arm, Patient Position: Sitting, Cuff Size: Adult Regular)   Pulse 78   Temp 98.3  F (36.8  C) (Oral)   Resp 16   Ht 1.88 m (6' 2\")   Wt 99.4 kg (219 lb 1.6 oz)   SpO2 97%   BMI 28.13 kg/m      Physical Exam  Vitals reviewed.   Constitutional:       Appearance: Normal appearance.   HENT:      Head: Normocephalic and atraumatic.      Nose: Nose normal.      Mouth/Throat:      Mouth: Mucous membranes are moist.   Eyes:      Pupils: Pupils are equal, round, and reactive to light.   Cardiovascular:      Rate and Rhythm: Normal rate and regular rhythm.      Pulses: Normal pulses.      Heart sounds: Normal heart sounds. No murmur heard.  Pulmonary:      Effort: Pulmonary effort is normal. No respiratory distress.      Breath sounds: Normal breath sounds.   Abdominal:      General: Abdomen is flat. Bowel sounds are normal.      Palpations: Abdomen is soft. There is splenomegaly. There is no hepatomegaly, " mass or pulsatile mass.      Comments: 1cm palpated spleen below costophrenic margin.    Musculoskeletal:         General: No swelling. Normal range of motion.      Cervical back: Normal range of motion and neck supple. No rigidity.   Skin:     General: Skin is warm and dry.   Neurological:      Mental Status: He is alert and oriented to person, place, and time. Mental status is at baseline.   Psychiatric:         Mood and Affect: Mood normal.         Behavior: Behavior normal.         Thought Content: Thought content normal.         Judgment: Judgment normal.            TODAY'S ASSESSMENT BY SYSTEMS       ASSESSMENT AND PLAN:  Developing post-PV Myelofibrosis.   - Recently diagnosed Pvera based on elevated Hgb, bone marrow showing polycythemia in all three precursors, additional splenomegaly and fibrosis G2  - Does not meet full criteria of Post-PV MF due to lack of consitutional symptoms such as fevers, chills or weight loss or cytopenias, though likely will develop.   - Bone marrow biopsy showing 3% blasts, 1% peripheral blasts.   - NGS showing TP53 with VAF of 30%.   - Mysec-PM is Intermediate-1 based on blood counts.   - Risk of progression to acute leukemia is 10-30% in 5 years based on single hit (<50% VAF or single mutation) https://ashpublications.org/blood/article/142/Supplement%023/8300/204583/TP53-mutations-and-Their-Impact-on-Survival-in  - He will likely need to undergo transplant at some point, however given he doesn't have cytopenias or peripheral blasts, and blasts are <5% in bone marrow, we can hold off at this time.  - However, if his TP53 VAF increases to 50% or new mutation is gained, this would affect his risk of progression and need for transplant.   - Recommend surveillance bone marrow biopsies every 3-6 months to evaluate progression of TP53 mutation, bone marrow blasts and fibrosis.   - If definitive evidence of secondary myelofibrosis from Pvera, recommend consideration of use of  Ruxolitinib which could help reduce the proliferative aspect of his disease.       LIVER  History of probable hepatic cyst vs cavernous hemangioma.     GASTROINTESTINAL  History of colon polyps, no evidence of colon cancer.     RENAL  History of nocturia, BPH, with prior PSA in 9/2022 at 1.4  On Tamsulosin 0.4mg    CARDIOVASCULAR  No history of cardiovascular disease. Prior stroke due to PFO, with closure now.   Currently on Plavix due to prior stroke and JAK2 positive mutated MPN.     ENDOCRINE  History of Vitamin D deficiency, on Vitamin D therapy.     MUSCULOSKELETAL  Prior TKAs in 2022.     NEUROLOGIC  History of CVA, now resolved, from PFO s/p closure.   On long term Clopidogrel due to newly diagnosed JAK2+ MPN.   History of polyneuropathy, could be secondary to JAK2 mutated MPN involving small nerve fibers, pending EMG.     PSYCHIATRIC  History of anxiety, on Clonazepam.     SOCIAL  Retired  with . Lives in [place] with wife.     Today I discussed the above diagnosis with Derik Quiros. We discussed the natural history of the disease and treatment to date. We reviewed all the available diagnostic information. We talked about general indications of transplant that include patient, disease and donor factors.     We also reviewed again the role of allogeneic stem cell transplantation in this disease. I described the process of work up of a potential recipient to confirm good organ function and reserve, reassess disease and decide on risks/benefit. We also discussed in great detail the process of the actual transplant itself, with discussion of different donor stem cell options. We discussed the role of the preparative regimen to clear any residual disease and to ablate the bone marrow, followed by infusion of the HSC graft. We discussed the expected toxicities and side effects, including organ toxicity, expected pancytopenia and need for transfusion support, risk of infection or bleeding,  possibility of delayed or non-engraftment, and the risk of treatment related mortality (10-20%). We also discussed the risk of acute or chronic GVHD (overall ~50%, 10-20% for severe GVH) and the need for immunosuppression within the first 100 days and perhaps beyond, depending on GVHD status then. I also mentioned the possibility of relapse which I would estimate at 30-40%. We discussed supportive care, needing a line with associated complications, and the critical need for a caregiver and proximity to [facility].    All of their questions were answered to their satisfaction.      Known issues that I take into account for medical decisions, with salient changes to the plan considering these complexities noted above.    Patient Active Problem List   Diagnosis    Fracture of femur following insertion of orthopedic implant, joint prosthesis, or bone plate, left leg    polycythemia vera       Today's summary:   Given expected mortality with transplant is 10-20% currently, but his current trajectory of TP53 mutated P-vera (likely becoming secondary MF but doesn't meet formal criteria) but his current mortality from P-vera with TP53 is 25% at 5 years, would hold off on transplant for the next several months until we identify higher risk or progression to secondary MF from Pvera, accelerated phase, or blasts >5% or TP53 greater than 50% or development of ASXL1 or other high risk mutations, or progressive cytopenias. Will do repeat NT in 6 months to re-assess disease trajectory.     I spent 90 minutes in the care of this patient today, which included time necessary for preparation for the visit, obtaining history, ordering medications/tests/procedures as medically indicated, review of pertinent medical literature, counseling of the patient, communication of recommendations to the care team, and documentation time.    The longitudinal plan of care for the diagnosis(es)/condition(s) as documented were addressed during this visit.  Due to the added complexity in care, I will continue to support Bill in the subsequent management and with ongoing continuity of care.    Keanu Allen MD  March 8, 2025        ------------------------------------------------------------------------------------------------------------------------------------------------    Patient Care Team         Relationship Specialty Notifications Start End    Dangelo Osorio MD PCP - General Internal Medicine  5/13/22     Phone: [phone] Fax: [phone]         [address] [facility] [address]    Inez Spaulding MD MD Hematology & Oncology  12/11/24     Phone: [phone] Fax: [phone] [address] [facility] [address]    Rachelle Terrazas, RN Specialty Care Coordinator Hematology & Oncology Admissions 12/12/24     Inez Spaulding MD Assigned Cancer Care Provider   12/23/24     Phone: [phone] Fax: [phone] [address] [facility] [address]

## 2025-03-09 PROBLEM — R35.1 BENIGN PROSTATIC HYPERPLASIA WITH NOCTURIA: Status: ACTIVE | Noted: 2025-03-09

## 2025-03-09 PROBLEM — D45 POLYCYTHEMIA VERA (H): Status: ACTIVE | Noted: 2025-03-09

## 2025-03-09 PROBLEM — G62.89 OTHER POLYNEUROPATHY: Status: ACTIVE | Noted: 2025-03-09

## 2025-03-09 PROBLEM — N40.1 BENIGN PROSTATIC HYPERPLASIA WITH NOCTURIA: Status: ACTIVE | Noted: 2025-03-09

## 2025-03-10 LAB
CMV IGG SERPL IA-ACNC: <0.2 U/ML
CMV IGG SERPL IA-ACNC: NORMAL

## 2025-03-11 LAB
FLOWPRA1 CELL: NORMAL
FLOWPRA1 COMMENTS: NORMAL
FLOWPRA1 RESULT: NORMAL
FLOWPRA1 TEST METHOD: NORMAL
FLOWPRA2 CELL: NORMAL
FLOWPRA2 COMMENTS: NORMAL
FLOWPRA2 RESULT: NORMAL
FLOWPRA2 TEST METHOD: NORMAL

## 2025-03-17 ENCOUNTER — LAB (OUTPATIENT)
Dept: INFUSION THERAPY | Facility: HOSPITAL | Age: 65
End: 2025-03-17
Attending: INTERNAL MEDICINE
Payer: MEDICARE

## 2025-03-17 VITALS
HEART RATE: 74 BPM | DIASTOLIC BLOOD PRESSURE: 80 MMHG | OXYGEN SATURATION: 97 % | SYSTOLIC BLOOD PRESSURE: 126 MMHG | TEMPERATURE: 97.6 F

## 2025-03-17 DIAGNOSIS — D75.1 ERYTHROCYTOSIS: Primary | ICD-10-CM

## 2025-03-17 DIAGNOSIS — D75.1 ERYTHROCYTOSIS: ICD-10-CM

## 2025-03-17 LAB
ALBUMIN SERPL BCG-MCNC: 4.4 G/DL (ref 3.5–5.2)
ALP SERPL-CCNC: 100 U/L (ref 40–150)
ALT SERPL W P-5'-P-CCNC: 25 U/L (ref 0–70)
ANION GAP SERPL CALCULATED.3IONS-SCNC: 9 MMOL/L (ref 7–15)
AST SERPL W P-5'-P-CCNC: 27 U/L (ref 0–45)
BASOPHILS # BLD AUTO: 0.1 10E3/UL (ref 0–0.2)
BASOPHILS NFR BLD AUTO: 1 %
BILIRUB SERPL-MCNC: 0.5 MG/DL
BUN SERPL-MCNC: 14.6 MG/DL (ref 8–23)
CALCIUM SERPL-MCNC: 8.9 MG/DL (ref 8.8–10.4)
CHLORIDE SERPL-SCNC: 105 MMOL/L (ref 98–107)
CREAT SERPL-MCNC: 0.77 MG/DL (ref 0.67–1.17)
EGFRCR SERPLBLD CKD-EPI 2021: >90 ML/MIN/1.73M2
EOSINOPHIL # BLD AUTO: 0.3 10E3/UL (ref 0–0.7)
EOSINOPHIL NFR BLD AUTO: 3 %
ERYTHROCYTE [DISTWIDTH] IN BLOOD BY AUTOMATED COUNT: 22 % (ref 10–15)
GLUCOSE SERPL-MCNC: 123 MG/DL (ref 70–99)
HCO3 SERPL-SCNC: 25 MMOL/L (ref 22–29)
HCT VFR BLD AUTO: 46 % (ref 40–53)
HGB BLD-MCNC: 14.2 G/DL (ref 13.3–17.7)
IMM GRANULOCYTES # BLD: 0.1 10E3/UL
IMM GRANULOCYTES NFR BLD: 1 %
LYMPHOCYTES # BLD AUTO: 1.2 10E3/UL (ref 0.8–5.3)
LYMPHOCYTES NFR BLD AUTO: 12 %
MCH RBC QN AUTO: 24.4 PG (ref 26.5–33)
MCHC RBC AUTO-ENTMCNC: 30.9 G/DL (ref 31.5–36.5)
MCV RBC AUTO: 79 FL (ref 78–100)
MONOCYTES # BLD AUTO: 0.5 10E3/UL (ref 0–1.3)
MONOCYTES NFR BLD AUTO: 5 %
NEUTROPHILS # BLD AUTO: 7.7 10E3/UL (ref 1.6–8.3)
NEUTROPHILS NFR BLD AUTO: 79 %
NRBC # BLD AUTO: 0 10E3/UL
NRBC BLD AUTO-RTO: 0 /100
PLATELET # BLD AUTO: 166 10E3/UL (ref 150–450)
POTASSIUM SERPL-SCNC: 4.6 MMOL/L (ref 3.4–5.3)
PROT SERPL-MCNC: 6.6 G/DL (ref 6.4–8.3)
RBC # BLD AUTO: 5.83 10E6/UL (ref 4.4–5.9)
SODIUM SERPL-SCNC: 139 MMOL/L (ref 135–145)
WBC # BLD AUTO: 9.8 10E3/UL (ref 4–11)

## 2025-03-17 PROCEDURE — 36415 COLL VENOUS BLD VENIPUNCTURE: CPT

## 2025-03-17 PROCEDURE — 85048 AUTOMATED LEUKOCYTE COUNT: CPT

## 2025-03-17 PROCEDURE — 85004 AUTOMATED DIFF WBC COUNT: CPT

## 2025-03-17 PROCEDURE — 80053 COMPREHEN METABOLIC PANEL: CPT

## 2025-03-17 RX ORDER — HEPARIN SODIUM (PORCINE) LOCK FLUSH IV SOLN 100 UNIT/ML 100 UNIT/ML
5 SOLUTION INTRAVENOUS
OUTPATIENT
Start: 2025-03-24

## 2025-03-17 RX ORDER — HEPARIN SODIUM,PORCINE 10 UNIT/ML
5-20 VIAL (ML) INTRAVENOUS DAILY PRN
OUTPATIENT
Start: 2025-03-24

## 2025-03-17 NOTE — PROGRESS NOTES
Infusion Nursing Note:  Derik Quiros presents today for therapeutic phlebotomy.   Patient seen by provider today: No   present during visit today: Not Applicable.    Note: BP (!) 143/67 (BP Location: Left arm, Patient Position: Sitting, Cuff Size: Adult Regular)   Pulse 71   Temp 97.6  F (36.4  C) (Oral)   SpO2 97% .  18# placed in R AC.     Intravenous Access:  Labs drawn without difficulty.  Peripheral IV placed.    Treatment Conditions:  Lab Results   Component Value Date    HGB 14.2 03/17/2025    WBC 9.8 03/17/2025    ANEUTAUTO 7.7 03/17/2025     03/17/2025        Hemoglobin 14.2 today, therefore meets parameters of hgb greater than 14 for therapeutic phlebotomy.      Post Infusion Assessment:  500cc removed. Patient monitored post procedure. VSS at discharge.       Discharge Plan:   Discharge instructions reviewed with: Patient.  Patient and/or family verbalized understanding of discharge instructions and all questions answered.  AVS to patient via [facility].  Patient will return 3/24/25 for next appointment.   Patient discharged in stable condition accompanied by: self.  Departure Mode: Ambulatory.      Misael Sanderson RN

## 2025-03-19 LAB
A*LOCUS SEROLOGIC EQUIVALENT: 3
ABTEST METHOD: NORMAL
B*LOCUS SEROLOGIC EQUIVALENT: 7
BW-1: NORMAL
C*LOCUS SEROLOGIC EQUIVALENT: 7
DQB1*LOCUS SEROLOGIC EQUIVALENT: 5
DQB1*SEROLOGIC EQUIVALENT: 6
DRB1*LOCUS SEROLOGIC EQUIVALENT: 1
DRB1*SEROLOGIC EQUIVALENT: 15
DRB5*LOCUS SEROLOGIC EQUIVALENT: 51
DRSSO TEST METHOD: NORMAL
HLA-A HIGH RES: NORMAL
HLA-B HIGH RES: NORMAL
HLA-CW HIGH RES: NORMAL
HLA-DPA1 HIGH RES: NORMAL
HLA-DPB1 HIGH RES: NORMAL
HLA-DQA1 HIGH RES: NORMAL
HLA-DQA1 HIGH RES: NORMAL
HLA-DQB1 HIGH RES: NORMAL
HLA-DQB1 HIGH RES: NORMAL
HLA-DRB1 HIGH RES: NORMAL
HLA-DRB1 HIGH RES: NORMAL
HLA-DRB5 HIGH RES: NORMAL

## 2025-03-24 ENCOUNTER — LAB (OUTPATIENT)
Dept: INFUSION THERAPY | Facility: HOSPITAL | Age: 65
End: 2025-03-24
Attending: INTERNAL MEDICINE
Payer: MEDICARE

## 2025-03-24 DIAGNOSIS — D75.1 ERYTHROCYTOSIS: Primary | ICD-10-CM

## 2025-03-24 LAB
ALBUMIN SERPL BCG-MCNC: 4.4 G/DL (ref 3.5–5.2)
ALP SERPL-CCNC: 100 U/L (ref 40–150)
ALT SERPL W P-5'-P-CCNC: 26 U/L (ref 0–70)
ANION GAP SERPL CALCULATED.3IONS-SCNC: 12 MMOL/L (ref 7–15)
AST SERPL W P-5'-P-CCNC: 31 U/L (ref 0–45)
BASOPHILS # BLD AUTO: 0.1 10E3/UL (ref 0–0.2)
BASOPHILS NFR BLD AUTO: 2 %
BILIRUB SERPL-MCNC: 0.4 MG/DL
BUN SERPL-MCNC: 12.5 MG/DL (ref 8–23)
CALCIUM SERPL-MCNC: 9.3 MG/DL (ref 8.8–10.4)
CHLORIDE SERPL-SCNC: 107 MMOL/L (ref 98–107)
CREAT SERPL-MCNC: 0.72 MG/DL (ref 0.67–1.17)
EGFRCR SERPLBLD CKD-EPI 2021: >90 ML/MIN/1.73M2
EOSINOPHIL # BLD AUTO: 0.3 10E3/UL (ref 0–0.7)
EOSINOPHIL NFR BLD AUTO: 3 %
ERYTHROCYTE [DISTWIDTH] IN BLOOD BY AUTOMATED COUNT: 21.1 % (ref 10–15)
GLUCOSE SERPL-MCNC: 110 MG/DL (ref 70–99)
HCO3 SERPL-SCNC: 22 MMOL/L (ref 22–29)
HCT VFR BLD AUTO: 42.5 % (ref 40–53)
HGB BLD-MCNC: 13.3 G/DL (ref 13.3–17.7)
IMM GRANULOCYTES # BLD: 0 10E3/UL
IMM GRANULOCYTES NFR BLD: 0 %
LYMPHOCYTES # BLD AUTO: 1.2 10E3/UL (ref 0.8–5.3)
LYMPHOCYTES NFR BLD AUTO: 12 %
MCH RBC QN AUTO: 24.5 PG (ref 26.5–33)
MCHC RBC AUTO-ENTMCNC: 31.3 G/DL (ref 31.5–36.5)
MCV RBC AUTO: 78 FL (ref 78–100)
MONOCYTES # BLD AUTO: 0.6 10E3/UL (ref 0–1.3)
MONOCYTES NFR BLD AUTO: 6 %
NEUTROPHILS # BLD AUTO: 7.4 10E3/UL (ref 1.6–8.3)
NEUTROPHILS NFR BLD AUTO: 77 %
NRBC # BLD AUTO: 0 10E3/UL
NRBC BLD AUTO-RTO: 0 /100
PLATELET # BLD AUTO: 140 10E3/UL (ref 150–450)
POTASSIUM SERPL-SCNC: 4.2 MMOL/L (ref 3.4–5.3)
PROT SERPL-MCNC: 6.4 G/DL (ref 6.4–8.3)
RBC # BLD AUTO: 5.42 10E6/UL (ref 4.4–5.9)
SODIUM SERPL-SCNC: 141 MMOL/L (ref 135–145)
WBC # BLD AUTO: 9.6 10E3/UL (ref 4–11)

## 2025-03-24 PROCEDURE — 80053 COMPREHEN METABOLIC PANEL: CPT

## 2025-03-24 PROCEDURE — 36415 COLL VENOUS BLD VENIPUNCTURE: CPT

## 2025-03-24 PROCEDURE — 85025 COMPLETE CBC W/AUTO DIFF WBC: CPT

## 2025-03-24 RX ORDER — HEPARIN SODIUM,PORCINE 10 UNIT/ML
5-20 VIAL (ML) INTRAVENOUS DAILY PRN
OUTPATIENT
Start: 2025-03-31

## 2025-03-24 RX ORDER — HEPARIN SODIUM (PORCINE) LOCK FLUSH IV SOLN 100 UNIT/ML 100 UNIT/ML
5 SOLUTION INTRAVENOUS
OUTPATIENT
Start: 2025-03-31

## 2025-03-24 NOTE — PROGRESS NOTES
Pt's Hgb today was 13.3 therefore, pt did not meet parameters for a phlebotomy today. Pt denied any new concerns or complaints. Pt will return 3/31.   Elena Au RN.

## 2025-03-31 ENCOUNTER — LAB (OUTPATIENT)
Dept: INFUSION THERAPY | Facility: HOSPITAL | Age: 65
End: 2025-03-31
Attending: INTERNAL MEDICINE
Payer: MEDICARE

## 2025-03-31 DIAGNOSIS — D75.1 ERYTHROCYTOSIS: Primary | ICD-10-CM

## 2025-03-31 LAB
ALBUMIN SERPL BCG-MCNC: 4.1 G/DL (ref 3.5–5.2)
ALP SERPL-CCNC: 96 U/L (ref 40–150)
ALT SERPL W P-5'-P-CCNC: 26 U/L (ref 0–70)
ANION GAP SERPL CALCULATED.3IONS-SCNC: 8 MMOL/L (ref 7–15)
AST SERPL W P-5'-P-CCNC: 27 U/L (ref 0–45)
BASOPHILS # BLD AUTO: 0.1 10E3/UL (ref 0–0.2)
BASOPHILS NFR BLD AUTO: 1 %
BILIRUB SERPL-MCNC: 0.3 MG/DL
BUN SERPL-MCNC: 10.9 MG/DL (ref 8–23)
CALCIUM SERPL-MCNC: 8.9 MG/DL (ref 8.8–10.4)
CHLORIDE SERPL-SCNC: 109 MMOL/L (ref 98–107)
CREAT SERPL-MCNC: 0.7 MG/DL (ref 0.67–1.17)
EGFRCR SERPLBLD CKD-EPI 2021: >90 ML/MIN/1.73M2
EOSINOPHIL # BLD AUTO: 0.4 10E3/UL (ref 0–0.7)
EOSINOPHIL NFR BLD AUTO: 4 %
ERYTHROCYTE [DISTWIDTH] IN BLOOD BY AUTOMATED COUNT: 20.2 % (ref 10–15)
GLUCOSE SERPL-MCNC: 109 MG/DL (ref 70–99)
HCO3 SERPL-SCNC: 25 MMOL/L (ref 22–29)
HCT VFR BLD AUTO: 43 % (ref 40–53)
HGB BLD-MCNC: 13.2 G/DL (ref 13.3–17.7)
IMM GRANULOCYTES # BLD: 0.1 10E3/UL
IMM GRANULOCYTES NFR BLD: 1 %
LYMPHOCYTES # BLD AUTO: 1.2 10E3/UL (ref 0.8–5.3)
LYMPHOCYTES NFR BLD AUTO: 13 %
MCH RBC QN AUTO: 24.1 PG (ref 26.5–33)
MCHC RBC AUTO-ENTMCNC: 30.7 G/DL (ref 31.5–36.5)
MCV RBC AUTO: 79 FL (ref 78–100)
MONOCYTES # BLD AUTO: 0.4 10E3/UL (ref 0–1.3)
MONOCYTES NFR BLD AUTO: 5 %
NEUTROPHILS # BLD AUTO: 7.1 10E3/UL (ref 1.6–8.3)
NEUTROPHILS NFR BLD AUTO: 77 %
NRBC # BLD AUTO: 0 10E3/UL
NRBC BLD AUTO-RTO: 0 /100
PLATELET # BLD AUTO: 146 10E3/UL (ref 150–450)
POTASSIUM SERPL-SCNC: 4.2 MMOL/L (ref 3.4–5.3)
PROT SERPL-MCNC: 6.2 G/DL (ref 6.4–8.3)
RBC # BLD AUTO: 5.48 10E6/UL (ref 4.4–5.9)
SODIUM SERPL-SCNC: 142 MMOL/L (ref 135–145)
WBC # BLD AUTO: 9.2 10E3/UL (ref 4–11)

## 2025-03-31 PROCEDURE — 84155 ASSAY OF PROTEIN SERUM: CPT

## 2025-03-31 PROCEDURE — 85025 COMPLETE CBC W/AUTO DIFF WBC: CPT

## 2025-03-31 PROCEDURE — 82310 ASSAY OF CALCIUM: CPT

## 2025-03-31 PROCEDURE — 36415 COLL VENOUS BLD VENIPUNCTURE: CPT

## 2025-03-31 RX ORDER — HEPARIN SODIUM,PORCINE 10 UNIT/ML
5-20 VIAL (ML) INTRAVENOUS DAILY PRN
OUTPATIENT
Start: 2025-04-07

## 2025-03-31 RX ORDER — HEPARIN SODIUM (PORCINE) LOCK FLUSH IV SOLN 100 UNIT/ML 100 UNIT/ML
5 SOLUTION INTRAVENOUS
OUTPATIENT
Start: 2025-04-07

## 2025-04-07 ENCOUNTER — LAB (OUTPATIENT)
Dept: INFUSION THERAPY | Facility: HOSPITAL | Age: 65
End: 2025-04-07
Attending: INTERNAL MEDICINE
Payer: MEDICARE

## 2025-04-07 DIAGNOSIS — D75.1 ERYTHROCYTOSIS: ICD-10-CM

## 2025-04-07 DIAGNOSIS — D45 POLYCYTHEMIA VERA (H): ICD-10-CM

## 2025-04-07 LAB
ALBUMIN SERPL BCG-MCNC: 4.4 G/DL (ref 3.5–5.2)
ALP SERPL-CCNC: 102 U/L (ref 40–150)
ALT SERPL W P-5'-P-CCNC: 30 U/L (ref 0–70)
ANION GAP SERPL CALCULATED.3IONS-SCNC: 10 MMOL/L (ref 7–15)
AST SERPL W P-5'-P-CCNC: 29 U/L (ref 0–45)
BASOPHILS # BLD AUTO: 0.1 10E3/UL (ref 0–0.2)
BASOPHILS NFR BLD AUTO: 1 %
BILIRUB SERPL-MCNC: 0.5 MG/DL
BUN SERPL-MCNC: 13.9 MG/DL (ref 8–23)
CALCIUM SERPL-MCNC: 9.4 MG/DL (ref 8.8–10.4)
CHLORIDE SERPL-SCNC: 106 MMOL/L (ref 98–107)
CREAT SERPL-MCNC: 0.77 MG/DL (ref 0.67–1.17)
EGFRCR SERPLBLD CKD-EPI 2021: >90 ML/MIN/1.73M2
EOSINOPHIL # BLD AUTO: 0.3 10E3/UL (ref 0–0.7)
EOSINOPHIL NFR BLD AUTO: 3 %
ERYTHROCYTE [DISTWIDTH] IN BLOOD BY AUTOMATED COUNT: 19.8 % (ref 10–15)
GLUCOSE SERPL-MCNC: 86 MG/DL (ref 70–99)
HCO3 SERPL-SCNC: 25 MMOL/L (ref 22–29)
HCT VFR BLD AUTO: 43.8 % (ref 40–53)
HGB BLD-MCNC: 13.3 G/DL (ref 13.3–17.7)
IMM GRANULOCYTES # BLD: 0.1 10E3/UL
IMM GRANULOCYTES NFR BLD: 1 %
LYMPHOCYTES # BLD AUTO: 1.4 10E3/UL (ref 0.8–5.3)
LYMPHOCYTES NFR BLD AUTO: 15 %
MCH RBC QN AUTO: 23.7 PG (ref 26.5–33)
MCHC RBC AUTO-ENTMCNC: 30.4 G/DL (ref 31.5–36.5)
MCV RBC AUTO: 78 FL (ref 78–100)
MONOCYTES # BLD AUTO: 0.6 10E3/UL (ref 0–1.3)
MONOCYTES NFR BLD AUTO: 6 %
NEUTROPHILS # BLD AUTO: 6.9 10E3/UL (ref 1.6–8.3)
NEUTROPHILS NFR BLD AUTO: 73 %
NRBC # BLD AUTO: 0 10E3/UL
NRBC BLD AUTO-RTO: 0 /100
PLATELET # BLD AUTO: 223 10E3/UL (ref 150–450)
POTASSIUM SERPL-SCNC: 4.2 MMOL/L (ref 3.4–5.3)
PROT SERPL-MCNC: 6.4 G/DL (ref 6.4–8.3)
RBC # BLD AUTO: 5.62 10E6/UL (ref 4.4–5.9)
SODIUM SERPL-SCNC: 141 MMOL/L (ref 135–145)
WBC # BLD AUTO: 9.4 10E3/UL (ref 4–11)

## 2025-04-07 PROCEDURE — 85004 AUTOMATED DIFF WBC COUNT: CPT

## 2025-04-07 PROCEDURE — 82247 BILIRUBIN TOTAL: CPT

## 2025-04-07 PROCEDURE — 82310 ASSAY OF CALCIUM: CPT

## 2025-04-07 PROCEDURE — 84155 ASSAY OF PROTEIN SERUM: CPT

## 2025-04-07 PROCEDURE — 85014 HEMATOCRIT: CPT

## 2025-04-07 PROCEDURE — 36415 COLL VENOUS BLD VENIPUNCTURE: CPT

## 2025-04-14 ENCOUNTER — LAB (OUTPATIENT)
Dept: INFUSION THERAPY | Facility: HOSPITAL | Age: 65
End: 2025-04-14
Attending: INTERNAL MEDICINE
Payer: MEDICARE

## 2025-04-14 DIAGNOSIS — D75.1 ERYTHROCYTOSIS: ICD-10-CM

## 2025-04-14 LAB
ALBUMIN SERPL BCG-MCNC: 4.3 G/DL (ref 3.5–5.2)
ALP SERPL-CCNC: 101 U/L (ref 40–150)
ALT SERPL W P-5'-P-CCNC: 29 U/L (ref 0–70)
ANION GAP SERPL CALCULATED.3IONS-SCNC: 11 MMOL/L (ref 7–15)
AST SERPL W P-5'-P-CCNC: 28 U/L (ref 0–45)
BASOPHILS # BLD AUTO: 0.1 10E3/UL (ref 0–0.2)
BASOPHILS NFR BLD AUTO: 1 %
BILIRUB SERPL-MCNC: 0.5 MG/DL
BUN SERPL-MCNC: 12.2 MG/DL (ref 8–23)
CALCIUM SERPL-MCNC: 9.1 MG/DL (ref 8.8–10.4)
CHLORIDE SERPL-SCNC: 107 MMOL/L (ref 98–107)
CREAT SERPL-MCNC: 0.72 MG/DL (ref 0.67–1.17)
EGFRCR SERPLBLD CKD-EPI 2021: >90 ML/MIN/1.73M2
EOSINOPHIL # BLD AUTO: 0.4 10E3/UL (ref 0–0.7)
EOSINOPHIL NFR BLD AUTO: 5 %
ERYTHROCYTE [DISTWIDTH] IN BLOOD BY AUTOMATED COUNT: 19.1 % (ref 10–15)
GLUCOSE SERPL-MCNC: 91 MG/DL (ref 70–99)
HCO3 SERPL-SCNC: 24 MMOL/L (ref 22–29)
HCT VFR BLD AUTO: 44.7 % (ref 40–53)
HGB BLD-MCNC: 13.6 G/DL (ref 13.3–17.7)
IMM GRANULOCYTES # BLD: 0 10E3/UL
IMM GRANULOCYTES NFR BLD: 1 %
LYMPHOCYTES # BLD AUTO: 1.4 10E3/UL (ref 0.8–5.3)
LYMPHOCYTES NFR BLD AUTO: 17 %
MCH RBC QN AUTO: 23.6 PG (ref 26.5–33)
MCHC RBC AUTO-ENTMCNC: 30.4 G/DL (ref 31.5–36.5)
MCV RBC AUTO: 78 FL (ref 78–100)
MONOCYTES # BLD AUTO: 0.5 10E3/UL (ref 0–1.3)
MONOCYTES NFR BLD AUTO: 7 %
NEUTROPHILS # BLD AUTO: 5.6 10E3/UL (ref 1.6–8.3)
NEUTROPHILS NFR BLD AUTO: 70 %
NRBC # BLD AUTO: 0 10E3/UL
NRBC BLD AUTO-RTO: 0 /100
PLATELET # BLD AUTO: 244 10E3/UL (ref 150–450)
POTASSIUM SERPL-SCNC: 4.2 MMOL/L (ref 3.4–5.3)
PROT SERPL-MCNC: 6.3 G/DL (ref 6.4–8.3)
RBC # BLD AUTO: 5.77 10E6/UL (ref 4.4–5.9)
SODIUM SERPL-SCNC: 142 MMOL/L (ref 135–145)
WBC # BLD AUTO: 8 10E3/UL (ref 4–11)

## 2025-04-14 PROCEDURE — 82040 ASSAY OF SERUM ALBUMIN: CPT

## 2025-04-14 PROCEDURE — 36415 COLL VENOUS BLD VENIPUNCTURE: CPT

## 2025-04-14 PROCEDURE — 85041 AUTOMATED RBC COUNT: CPT

## 2025-04-14 PROCEDURE — 85004 AUTOMATED DIFF WBC COUNT: CPT

## 2025-04-16 ENCOUNTER — ONCOLOGY VISIT (OUTPATIENT)
Dept: ONCOLOGY | Facility: CLINIC | Age: 65
End: 2025-04-16
Attending: INTERNAL MEDICINE
Payer: MEDICARE

## 2025-04-16 VITALS
HEART RATE: 85 BPM | OXYGEN SATURATION: 98 % | SYSTOLIC BLOOD PRESSURE: 155 MMHG | TEMPERATURE: 98 F | RESPIRATION RATE: 16 BRPM | DIASTOLIC BLOOD PRESSURE: 81 MMHG | BODY MASS INDEX: 28.28 KG/M2 | WEIGHT: 220.3 LBS

## 2025-04-16 DIAGNOSIS — D75.1 ERYTHROCYTOSIS: ICD-10-CM

## 2025-04-16 DIAGNOSIS — D45 POLYCYTHEMIA VERA (H): ICD-10-CM

## 2025-04-16 PROCEDURE — G0463 HOSPITAL OUTPT CLINIC VISIT: HCPCS | Performed by: INTERNAL MEDICINE

## 2025-04-16 RX ORDER — ROPEGINTERFERON ALFA-2B 500 UG/ML
200 INJECTION SUBCUTANEOUS
Qty: 6 ML | Refills: 0 | Status: SHIPPED | OUTPATIENT
Start: 2025-04-16

## 2025-04-16 ASSESSMENT — PAIN SCALES - GENERAL: PAINLEVEL_OUTOF10: NO PAIN (0)

## 2025-04-16 NOTE — NURSING NOTE
"Oncology Rooming Note    2025 7:19 AM   Derik Quiros is a 64 year old male who presents for:    Chief Complaint   Patient presents with    Oncology Clinic Visit     Polycythemia vera     Initial Vitals: BP (!) 172/79 (BP Location: Right arm, Patient Position: Sitting, Cuff Size: Adult Regular)   Pulse 85   Temp 98  F (36.7  C) (Oral)   Resp 16   Wt 99.9 kg (220 lb 4.8 oz)   SpO2 98%   BMI 28.28 kg/m   Estimated body mass index is 28.28 kg/m  as calculated from the following:    Height as of 3/7/25: 1.88 m (6' 2\").    Weight as of this encounter: 99.9 kg (220 lb 4.8 oz). Body surface area is 2.28 meters squared.  No Pain (0) Comment: Data Unavailable   No LMP for male patient.  Allergies reviewed: Yes  Medications reviewed: Yes    Medications: Medication refills not needed today.  Pharmacy name entered into [employer]:    [id number] - [place], [place] - [address]  [id number] - [place], [place] - [address] AT [facility] [address]  [facility] - [place], [place] - [address]    Frailty Screening:   Is the patient here for a new oncology consult visit in cancer care? 2. No    PHQ9:  Did this patient require a PHQ9?: No      Clinical concerns: BP: 172/79; asymptomatic. 2nd readin/81. Pt is nervous.        Freida Valentin"

## 2025-04-16 NOTE — PROGRESS NOTES
Hematology Clinic note  In person     Problem List:  -Polycythemia vera-high risk.P vera initially managed with therapeutic phlebotomy and hydroxyurea, transition to ropeginterferon (BESREMi) on 2/21/2025.  Collected 12/23/24           Resulting Agency   Final Diagnosis   BONE MARROW, LEFT POSTERIOR ILIAC CREST, ASPIRATION AND CORE BIOPSY:  -HYPERCELLULAR MARROW FOR AGE (80%), FEATURES CONSISTENT WITH CHRONIC MYELOPROLIFERATIVE DISORDER  -ERYTHROID HYPERPLASIA  -MEGAKARYOCYTIC HYPERPLASIA  -MODERATE RETICULIN FIBROSIS  -APPROXIMATELY 3% BLASTS  -PLEASE SEE COMMENT     PERIPHERAL BLOOD:  -ERYTHROCYTOSIS WITH INCREASE IN HEMOGLOBIN AND HEMATOCRIT  -MICROCYTIC RED BLOOD CELLS ARE PRESENT  -THROMBOCYTOSIS  -LEUKOCYTOSIS WITH ABSOLUTE NEUTROPHILIA, EOSINOPHILIA, AND BASOPHILIA  -APPROXIMATELY 1% BLASTS    Electronically signed by Nas Castro MD on 12/31/2024 at  4:35 PM   Comment   [facility]   Flow cytometric analysis of bone marrow (see case [id number], [facility]) shows no evidence of acute leukemia or malignant lymphoproliferative disorder.       Normal cytogenetics  The previously identified pathogenic  JAK2 V617F mutation was again identified (VAF 68%).. In addition, a pathogenic variant in TP53 was also detected: TP53 [id number] (VAF 30%).     -History of PFO with stroke, status post closure Decades ago-open procedure  - Hypertension  - s/p left hip replacement 2011  -Left Hip fracture, status post open reduction internal fixation 5/14/2022  - History of colonic polyps, colonoscopy 3/20/24  -History of nonmelanoma skin cancer removed from cheek and back 2019  -History of liver hemangioma  -Anxiety  -Status post appendectomy  - Status post testicular varicocele surgery.  -Possible aspirin allergy, edema     History of Present Illness: Mr. Quiros is a 64-year-old man here for follow up of polycythemia vera.  He is on Ropeginterferon (BESREMi) 200 mcg subcutaneous  every 14 days.  The hydroxyurea has been discontinued.  He has not needed a therapeutic phlebotomy now for many weeks.  Overall he says that he is feeling a little bit better, has some fatigue but is not taking routine naps.  He sleeps well at night.  He thinks the itching is a bit better.  No problems with headaches or blurred vision.  No left upper quadrant pain or early satiety.  He had a consultation with Dr. Keanu Allen, BMT on 3/7/2025.     He has some numbness in his feet, and had an EMG which confirmed early mild polyneuropathy.      PHYSICAL EXAMINATION:  BP (!) 155/81 (BP Location: Right arm, Patient Position: Sitting, Cuff Size: Adult Regular)   Pulse 85   Temp 98  F (36.7  C) (Oral)   Resp 16   Wt 99.9 kg (220 lb 4.8 oz)   SpO2 98%   BMI 28.28 kg/m      General appearance:  Patient is 64 year old man in no acute distress.     HEENT:  No pallor, icterus, or mucositis.  No thyromegaly.   Lungs:  Clear to auscultation bilaterally.   Heart:  Regular rate and rhythm; no S3 S4 or murmer.     Abdomen:  Positive bowel sounds, soft and nontender, nondistended.  No hepatomegaly. No splenomegaly appreciated today.    Extremities:  No joint swelling or tenderness.  No ankle edema.     Skin:  No rash, no petechiae or ecchymoses.    Labs:   Latest Reference Range & Units 03/24/25 07:47 03/31/25 07:56 04/07/25 13:43 04/14/25 08:31   WBC 4.0 - 11.0 10e3/uL 9.6 9.2 9.4 8.0   Hemoglobin 13.3 - 17.7 g/dL 13.3 13.2 (L) 13.3 13.6   Hematocrit 40.0 - 53.0 % 42.5 43.0 43.8 44.7   Platelet Count 150 - 450 10e3/uL 140 (L) 146 (L) 223 244   RBC Count 4.40 - 5.90 10e6/uL 5.42 5.48 5.62 5.77   MCV 78 - 100 fL 78 79 78 78   MCH 26.5 - 33.0 pg 24.5 (L) 24.1 (L) 23.7 (L) 23.6 (L)   MCHC 31.5 - 36.5 g/dL 31.3 (L) 30.7 (L) 30.4 (L) 30.4 (L)   RDW 10.0 - 15.0 % 21.1 (H) 20.2 (H) 19.8 (H) 19.1 (H)   % Neutrophils % 77 77 73 70   % Lymphocytes % 12 13 15 17   % Monocytes % 6 5 6 7   % Eosinophils % 3 4 3 5   % Basophils % 2 1  1 1   % Immature Granulocytes % 0 1 1 1   Absolute Basophils 0.0 - 0.2 10e3/uL 0.1 0.1 0.1 0.1   Absolute Eosinophils 0.0 - 0.7 10e3/uL 0.3 0.4 0.3 0.4   Absolute Immature Granulocytes <=0.4 10e3/uL 0.0 0.1 0.1 0.0   Absolute Lymphocytes 0.8 - 5.3 10e3/uL 1.2 1.2 1.4 1.4   Absolute Monocytes 0.0 - 1.3 10e3/uL 0.6 0.4 0.6 0.5   Absolute Neutrophils 1.6 - 8.3 10e3/uL 7.4 7.1 6.9 5.6   (L): Data is abnormally low  (H): Data is abnormally high      Assessment and Recommendation: -     Polycythemia vera, High risk -The bone marrow biopsy 12/23/24, shows a TP53 mutation, with variant allele frequency (EF) of 30%.  The JAK2 V6 17F VF was 68%.  Extended myeloid panel showed no other genetic mutations.  The T p53 mutation places him at increased risk of conversion to acute myelogenous leukemia (AML).  He has had consultation with Dr. Allen in BMT, and I agree that he is not at a point right now that he needs a transplant, but needs to be closely monitored with bone marrow biopsy and aspirate.  And if he has increased fibrosis Jakafi can be started.    He started ropeginterferon on 2/21/25, and is now up to 200 micrograms subcutaneous once every 14 days.  Since his white count is less than 10, hematocrit less than 45, and platelet count less than 400, rather than increasing the dose right now, I will keep him at 200 mcg subcu every 14 days.  This can be increased to 250 mc if hematocrit starts creeping up. .    -Continue ropeginterferon at 200 mcg subcu every 14 days  - Hydroxyurea has been discontinued  - Continue clopidogrel 75 mg daily  - Decrease frequency of labs to every other week, cancel therapeutic phlebotomy appointments  -Repeat bone marrow biopsy and aspirate end of June    #Hypertension-continue on amlodipine      # Goals of Care- indicated that quality of life is more important to him than longevity. He is full code, which is appropriate for his current status. He gave me his signed and notarized [facility] care  "directive- will be scanned into EPIC.    RTC July, after BM Bx.         References:   Luz H et al, \"Ropeginterferon alpha-TB versus standard therapy for polycythemia vera (PROUD-PV and continuation PROUD-PV) : A randomized, noninferiority, phase 3 trial and its extension study, the Lancet, volume 7, March 2020     The most recent study, which also accounts for multiple mutations and chromosomes was recently published in the NEJM (Dilan and Melinda et al. 2018 [https://cancer.shai.ac.uk/mpn-multistage/]).     https://www.shai.ac.uk/tool/progmod/progmod/      Time: I spent a total of 45 minutes on the day of the visit. Please see the note for further information on patient assessment and treatment.      Inez Spaulding MD  Hematology         "

## 2025-04-16 NOTE — LETTER
4/16/2025      Derik Quiros  [address]  [place] [address]      Dear Colleague,    Thank you for referring your patient, Derik Quiros, to the [facility]. Please see a copy of my visit note below.    Hematology Clinic note  In person     Problem List:  -Polycythemia vera-high risk.P vera initially managed with therapeutic phlebotomy and hydroxyurea, transition to ropeginterferon (BESREMi) on 2/21/2025.  Collected 12/23/24           Resulting Agency   Final Diagnosis   BONE MARROW, LEFT POSTERIOR ILIAC CREST, ASPIRATION AND CORE BIOPSY:  -HYPERCELLULAR MARROW FOR AGE (80%), FEATURES CONSISTENT WITH CHRONIC MYELOPROLIFERATIVE DISORDER  -ERYTHROID HYPERPLASIA  -MEGAKARYOCYTIC HYPERPLASIA  -MODERATE RETICULIN FIBROSIS  -APPROXIMATELY 3% BLASTS  -PLEASE SEE COMMENT     PERIPHERAL BLOOD:  -ERYTHROCYTOSIS WITH INCREASE IN HEMOGLOBIN AND HEMATOCRIT  -MICROCYTIC RED BLOOD CELLS ARE PRESENT  -THROMBOCYTOSIS  -LEUKOCYTOSIS WITH ABSOLUTE NEUTROPHILIA, EOSINOPHILIA, AND BASOPHILIA  -APPROXIMATELY 1% BLASTS    Electronically signed by Nas Castro MD on 12/31/2024 at  4:35 PM   Comment   [facility]   Flow cytometric analysis of bone marrow (see case [id number], [facility]) shows no evidence of acute leukemia or malignant lymphoproliferative disorder.       Normal cytogenetics  The previously identified pathogenic  JAK2 V617F mutation was again identified (VAF 68%).. In addition, a pathogenic variant in TP53 was also detected: TP53 [id number] (VAF 30%).     -History of PFO with stroke, status post closure Decades ago-open procedure  - Hypertension  - s/p left hip replacement 2011  -Left Hip fracture, status post open reduction internal fixation 5/14/2022  - History of colonic polyps, colonoscopy 3/20/24  -History of nonmelanoma skin cancer removed from cheek and back 2019  -History of liver hemangioma  -Anxiety  -Status post  appendectomy  - Status post testicular varicocele surgery.  -Possible aspirin allergy, edema     History of Present Illness: Mr. Quiros is a 64-year-old man here for follow up of polycythemia vera.  He is on Ropeginterferon (BESREMi) 200 mcg subcutaneous every 14 days.  The hydroxyurea has been discontinued.  He has not needed a therapeutic phlebotomy now for many weeks.  Overall he says that he is feeling a little bit better, has some fatigue but is not taking routine naps.  He sleeps well at night.  He thinks the itching is a bit better.  No problems with headaches or blurred vision.  No left upper quadrant pain or early satiety.  He had a consultation with Dr. Keanu Allen, BMT on 3/7/2025.     He has some numbness in his feet, and had an EMG which confirmed early mild polyneuropathy.      PHYSICAL EXAMINATION:  BP (!) 155/81 (BP Location: Right arm, Patient Position: Sitting, Cuff Size: Adult Regular)   Pulse 85   Temp 98  F (36.7  C) (Oral)   Resp 16   Wt 99.9 kg (220 lb 4.8 oz)   SpO2 98%   BMI 28.28 kg/m      General appearance:  Patient is 64 year old man in no acute distress.     HEENT:  No pallor, icterus, or mucositis.  No thyromegaly.   Lungs:  Clear to auscultation bilaterally.   Heart:  Regular rate and rhythm; no S3 S4 or murmer.     Abdomen:  Positive bowel sounds, soft and nontender, nondistended.  No hepatomegaly. No splenomegaly appreciated today.    Extremities:  No joint swelling or tenderness.  No ankle edema.     Skin:  No rash, no petechiae or ecchymoses.    Labs:   Latest Reference Range & Units 03/24/25 07:47 03/31/25 07:56 04/07/25 13:43 04/14/25 08:31   WBC 4.0 - 11.0 10e3/uL 9.6 9.2 9.4 8.0   Hemoglobin 13.3 - 17.7 g/dL 13.3 13.2 (L) 13.3 13.6   Hematocrit 40.0 - 53.0 % 42.5 43.0 43.8 44.7   Platelet Count 150 - 450 10e3/uL 140 (L) 146 (L) 223 244   RBC Count 4.40 - 5.90 10e6/uL 5.42 5.48 5.62 5.77   MCV 78 - 100 fL 78 79 78 78   MCH 26.5 - 33.0 pg 24.5 (L) 24.1 (L) 23.7  (L) 23.6 (L)   MCHC 31.5 - 36.5 g/dL 31.3 (L) 30.7 (L) 30.4 (L) 30.4 (L)   RDW 10.0 - 15.0 % 21.1 (H) 20.2 (H) 19.8 (H) 19.1 (H)   % Neutrophils % 77 77 73 70   % Lymphocytes % 12 13 15 17   % Monocytes % 6 5 6 7   % Eosinophils % 3 4 3 5   % Basophils % 2 1 1 1   % Immature Granulocytes % 0 1 1 1   Absolute Basophils 0.0 - 0.2 10e3/uL 0.1 0.1 0.1 0.1   Absolute Eosinophils 0.0 - 0.7 10e3/uL 0.3 0.4 0.3 0.4   Absolute Immature Granulocytes <=0.4 10e3/uL 0.0 0.1 0.1 0.0   Absolute Lymphocytes 0.8 - 5.3 10e3/uL 1.2 1.2 1.4 1.4   Absolute Monocytes 0.0 - 1.3 10e3/uL 0.6 0.4 0.6 0.5   Absolute Neutrophils 1.6 - 8.3 10e3/uL 7.4 7.1 6.9 5.6   (L): Data is abnormally low  (H): Data is abnormally high      Assessment and Recommendation: -     Polycythemia vera, High risk -The bone marrow biopsy 12/23/24, shows a TP53 mutation, with variant allele frequency (EF) of 30%.  The JAK2 V6 17F VF was 68%.  Extended myeloid panel showed no other genetic mutations.  The T p53 mutation places him at increased risk of conversion to acute myelogenous leukemia (AML).  He has had consultation with Dr. Allen in BMT, and I agree that he is not at a point right now that he needs a transplant, but needs to be closely monitored with bone marrow biopsy and aspirate.  And if he has increased fibrosis Jakafi can be started.    He started ropeginterferon on 2/21/25, and is now up to 200 micrograms subcutaneous once every 14 days.  Since his white count is less than 10, hematocrit less than 45, and platelet count less than 400, rather than increasing the dose right now, I will keep him at 200 mcg subcu every 14 days.  This can be increased to 250 mc if hematocrit starts creeping up. .    -Continue ropeginterferon at 200 mcg subcu every 14 days  - Hydroxyurea has been discontinued  - Continue clopidogrel 75 mg daily  - Decrease frequency of labs to every other week, cancel therapeutic phlebotomy appointments  -Repeat bone marrow biopsy and aspirate  "end of June    #Hypertension-continue on amlodipine      # Goals of Care- indicated that quality of life is more important to him than longevity. He is full code, which is appropriate for his current status. He gave me his signed and notarized health care directive- will be scanned into EPIC.    RTC July, after BM Bx.         References:   Luz GASPAR et al, \"Ropeginterferon alpha-TB versus standard therapy for polycythemia vera (PROUD-PV and continuation PROUD-PV) : A randomized, noninferiority, phase 3 trial and its extension study, the Lancet, volume 7, March 2020     The most recent study, which also accounts for multiple mutations and chromosomes was recently published in the NEJM (Dilan and Melinda et al. 2018 [https://cancer.shai.ac.uk/mpn-multistage/]).     https://www.shai.ac.uk/tool/progmod/progmod/      Time: I spent a total of 45 minutes on the day of the visit. Please see the note for further information on patient assessment and treatment.      Inez Spaulding MD  Hematology           Again, thank you for allowing me to participate in the care of your patient.        Sincerely,        Inez Spaulding MD    Electronically signed"

## 2025-04-17 DIAGNOSIS — D45 POLYCYTHEMIA VERA (H): Primary | ICD-10-CM

## 2025-04-28 ENCOUNTER — LAB (OUTPATIENT)
Dept: INFUSION THERAPY | Facility: HOSPITAL | Age: 65
End: 2025-04-28
Attending: INTERNAL MEDICINE
Payer: MEDICARE

## 2025-04-28 DIAGNOSIS — D75.1 ERYTHROCYTOSIS: Primary | ICD-10-CM

## 2025-04-28 LAB
ALBUMIN SERPL BCG-MCNC: 4.3 G/DL (ref 3.5–5.2)
ALP SERPL-CCNC: 103 U/L (ref 40–150)
ALT SERPL W P-5'-P-CCNC: 28 U/L (ref 0–70)
ANION GAP SERPL CALCULATED.3IONS-SCNC: 9 MMOL/L (ref 7–15)
AST SERPL W P-5'-P-CCNC: 30 U/L (ref 0–45)
BASOPHILS # BLD AUTO: 0.1 10E3/UL (ref 0–0.2)
BASOPHILS NFR BLD AUTO: 1 %
BILIRUB SERPL-MCNC: 0.5 MG/DL
BUN SERPL-MCNC: 13.8 MG/DL (ref 8–23)
CALCIUM SERPL-MCNC: 9 MG/DL (ref 8.8–10.4)
CHLORIDE SERPL-SCNC: 108 MMOL/L (ref 98–107)
CREAT SERPL-MCNC: 0.69 MG/DL (ref 0.67–1.17)
EGFRCR SERPLBLD CKD-EPI 2021: >90 ML/MIN/1.73M2
EOSINOPHIL # BLD AUTO: 0.5 10E3/UL (ref 0–0.7)
EOSINOPHIL NFR BLD AUTO: 5 %
ERYTHROCYTE [DISTWIDTH] IN BLOOD BY AUTOMATED COUNT: 18.7 % (ref 10–15)
GLUCOSE SERPL-MCNC: 85 MG/DL (ref 70–99)
HCO3 SERPL-SCNC: 24 MMOL/L (ref 22–29)
HCT VFR BLD AUTO: 44 % (ref 40–53)
HGB BLD-MCNC: 13.5 G/DL (ref 13.3–17.7)
IMM GRANULOCYTES # BLD: 0 10E3/UL
IMM GRANULOCYTES NFR BLD: 0 %
LYMPHOCYTES # BLD AUTO: 1.4 10E3/UL (ref 0.8–5.3)
LYMPHOCYTES NFR BLD AUTO: 15 %
MCH RBC QN AUTO: 23.1 PG (ref 26.5–33)
MCHC RBC AUTO-ENTMCNC: 30.7 G/DL (ref 31.5–36.5)
MCV RBC AUTO: 75 FL (ref 78–100)
MONOCYTES # BLD AUTO: 0.6 10E3/UL (ref 0–1.3)
MONOCYTES NFR BLD AUTO: 6 %
NEUTROPHILS # BLD AUTO: 6.7 10E3/UL (ref 1.6–8.3)
NEUTROPHILS NFR BLD AUTO: 72 %
NRBC # BLD AUTO: 0 10E3/UL
NRBC BLD AUTO-RTO: 0 /100
PLATELET # BLD AUTO: 385 10E3/UL (ref 150–450)
POTASSIUM SERPL-SCNC: 4.5 MMOL/L (ref 3.4–5.3)
PROT SERPL-MCNC: 6.3 G/DL (ref 6.4–8.3)
RBC # BLD AUTO: 5.84 10E6/UL (ref 4.4–5.9)
SODIUM SERPL-SCNC: 141 MMOL/L (ref 135–145)
WBC # BLD AUTO: 9.4 10E3/UL (ref 4–11)

## 2025-04-28 PROCEDURE — 85004 AUTOMATED DIFF WBC COUNT: CPT

## 2025-04-28 PROCEDURE — 36415 COLL VENOUS BLD VENIPUNCTURE: CPT

## 2025-04-28 PROCEDURE — 80053 COMPREHEN METABOLIC PANEL: CPT

## 2025-04-28 RX ORDER — HEPARIN SODIUM,PORCINE 10 UNIT/ML
5-20 VIAL (ML) INTRAVENOUS DAILY PRN
OUTPATIENT
Start: 2025-05-05

## 2025-04-28 RX ORDER — HEPARIN SODIUM (PORCINE) LOCK FLUSH IV SOLN 100 UNIT/ML 100 UNIT/ML
5 SOLUTION INTRAVENOUS
OUTPATIENT
Start: 2025-05-05

## 2025-05-01 ENCOUNTER — PATIENT OUTREACH (OUTPATIENT)
Dept: ONCOLOGY | Facility: CLINIC | Age: 65
End: 2025-05-01
Payer: MEDICARE

## 2025-05-01 NOTE — PROGRESS NOTES
[facility]: Cancer Care                                                                                          Completed chart audit to update Oncology Care Coordination enrollment status.  Reviewed POC and pt has appropriate follow up scheduled.       Signature:  Rachelle Terrazas RN

## 2025-05-12 ENCOUNTER — LAB (OUTPATIENT)
Dept: INFUSION THERAPY | Facility: HOSPITAL | Age: 65
End: 2025-05-12
Attending: INTERNAL MEDICINE
Payer: MEDICARE

## 2025-05-12 DIAGNOSIS — D75.1 ERYTHROCYTOSIS: ICD-10-CM

## 2025-05-12 LAB
ALBUMIN SERPL BCG-MCNC: 4.3 G/DL (ref 3.5–5.2)
ALP SERPL-CCNC: 105 U/L (ref 40–150)
ALT SERPL W P-5'-P-CCNC: 33 U/L (ref 0–70)
ANION GAP SERPL CALCULATED.3IONS-SCNC: 11 MMOL/L (ref 7–15)
AST SERPL W P-5'-P-CCNC: 34 U/L (ref 0–45)
BASOPHILS # BLD AUTO: 0.1 10E3/UL (ref 0–0.2)
BASOPHILS NFR BLD AUTO: 1 %
BILIRUB SERPL-MCNC: 0.5 MG/DL
BUN SERPL-MCNC: 12.7 MG/DL (ref 8–23)
CALCIUM SERPL-MCNC: 8.7 MG/DL (ref 8.8–10.4)
CHLORIDE SERPL-SCNC: 110 MMOL/L (ref 98–107)
CREAT SERPL-MCNC: 0.68 MG/DL (ref 0.67–1.17)
EGFRCR SERPLBLD CKD-EPI 2021: >90 ML/MIN/1.73M2
EOSINOPHIL # BLD AUTO: 0.6 10E3/UL (ref 0–0.7)
EOSINOPHIL NFR BLD AUTO: 7 %
ERYTHROCYTE [DISTWIDTH] IN BLOOD BY AUTOMATED COUNT: 19.1 % (ref 10–15)
GLUCOSE SERPL-MCNC: 86 MG/DL (ref 70–99)
HCO3 SERPL-SCNC: 21 MMOL/L (ref 22–29)
HCT VFR BLD AUTO: 43.7 % (ref 40–53)
HGB BLD-MCNC: 13.2 G/DL (ref 13.3–17.7)
IMM GRANULOCYTES # BLD: 0 10E3/UL
IMM GRANULOCYTES NFR BLD: 1 %
LYMPHOCYTES # BLD AUTO: 1.2 10E3/UL (ref 0.8–5.3)
LYMPHOCYTES NFR BLD AUTO: 16 %
MCH RBC QN AUTO: 22.5 PG (ref 26.5–33)
MCHC RBC AUTO-ENTMCNC: 30.2 G/DL (ref 31.5–36.5)
MCV RBC AUTO: 74 FL (ref 78–100)
MONOCYTES # BLD AUTO: 0.5 10E3/UL (ref 0–1.3)
MONOCYTES NFR BLD AUTO: 7 %
NEUTROPHILS # BLD AUTO: 5.4 10E3/UL (ref 1.6–8.3)
NEUTROPHILS NFR BLD AUTO: 69 %
NRBC # BLD AUTO: 0 10E3/UL
NRBC BLD AUTO-RTO: 0 /100
PLATELET # BLD AUTO: 358 10E3/UL (ref 150–450)
POTASSIUM SERPL-SCNC: 4.1 MMOL/L (ref 3.4–5.3)
PROT SERPL-MCNC: 6.3 G/DL (ref 6.4–8.3)
RBC # BLD AUTO: 5.87 10E6/UL (ref 4.4–5.9)
SODIUM SERPL-SCNC: 142 MMOL/L (ref 135–145)
WBC # BLD AUTO: 7.9 10E3/UL (ref 4–11)

## 2025-05-12 PROCEDURE — 85025 COMPLETE CBC W/AUTO DIFF WBC: CPT

## 2025-05-12 PROCEDURE — 82565 ASSAY OF CREATININE: CPT

## 2025-05-12 PROCEDURE — 36415 COLL VENOUS BLD VENIPUNCTURE: CPT

## 2025-05-18 ENCOUNTER — HEALTH MAINTENANCE LETTER (OUTPATIENT)
Age: 65
End: 2025-05-18

## 2025-05-28 ENCOUNTER — LAB (OUTPATIENT)
Dept: LAB | Facility: CLINIC | Age: 65
End: 2025-05-28
Payer: MEDICARE

## 2025-05-28 DIAGNOSIS — D45 POLYCYTHEMIA VERA (H): ICD-10-CM

## 2025-05-28 DIAGNOSIS — D45 POLYCYTHEMIA VERA (H): Primary | ICD-10-CM

## 2025-05-28 DIAGNOSIS — D75.1 ERYTHROCYTOSIS: Primary | ICD-10-CM

## 2025-05-28 LAB
ALBUMIN SERPL BCG-MCNC: 4.3 G/DL (ref 3.5–5.2)
ALP SERPL-CCNC: 106 U/L (ref 40–150)
ALT SERPL W P-5'-P-CCNC: 29 U/L (ref 0–70)
ANION GAP SERPL CALCULATED.3IONS-SCNC: 9 MMOL/L (ref 7–15)
AST SERPL W P-5'-P-CCNC: 32 U/L (ref 0–45)
BASOPHILS # BLD AUTO: 0.1 10E3/UL (ref 0–0.2)
BASOPHILS NFR BLD AUTO: 1 %
BILIRUB SERPL-MCNC: 0.6 MG/DL
BUN SERPL-MCNC: 10.8 MG/DL (ref 8–23)
CALCIUM SERPL-MCNC: 9 MG/DL (ref 8.8–10.4)
CHLORIDE SERPL-SCNC: 108 MMOL/L (ref 98–107)
CREAT SERPL-MCNC: 0.7 MG/DL (ref 0.67–1.17)
EGFRCR SERPLBLD CKD-EPI 2021: >90 ML/MIN/1.73M2
EOSINOPHIL # BLD AUTO: 0.6 10E3/UL (ref 0–0.7)
EOSINOPHIL NFR BLD AUTO: 6 %
ERYTHROCYTE [DISTWIDTH] IN BLOOD BY AUTOMATED COUNT: 19.6 % (ref 10–15)
GLUCOSE SERPL-MCNC: 86 MG/DL (ref 70–99)
HCO3 SERPL-SCNC: 23 MMOL/L (ref 22–29)
HCT VFR BLD AUTO: 47.4 % (ref 40–53)
HGB BLD-MCNC: 13.7 G/DL (ref 13.3–17.7)
HOLD SPECIMEN: NORMAL
HOLD SPECIMEN: NORMAL
IMM GRANULOCYTES # BLD: 0 10E3/UL
IMM GRANULOCYTES NFR BLD: 0 %
LYMPHOCYTES # BLD AUTO: 1.3 10E3/UL (ref 0.8–5.3)
LYMPHOCYTES NFR BLD AUTO: 15 %
MCH RBC QN AUTO: 21.8 PG (ref 26.5–33)
MCHC RBC AUTO-ENTMCNC: 28.9 G/DL (ref 31.5–36.5)
MCV RBC AUTO: 76 FL (ref 78–100)
MONOCYTES # BLD AUTO: 0.5 10E3/UL (ref 0–1.3)
MONOCYTES NFR BLD AUTO: 6 %
NEUTROPHILS # BLD AUTO: 6.4 10E3/UL (ref 1.6–8.3)
NEUTROPHILS NFR BLD AUTO: 72 %
NRBC # BLD AUTO: 0 10E3/UL
NRBC BLD AUTO-RTO: 0 /100
PLATELET # BLD AUTO: 392 10E3/UL (ref 150–450)
POTASSIUM SERPL-SCNC: 4.5 MMOL/L (ref 3.4–5.3)
PROT SERPL-MCNC: 6.5 G/DL (ref 6.4–8.3)
RBC # BLD AUTO: 6.28 10E6/UL (ref 4.4–5.9)
SODIUM SERPL-SCNC: 140 MMOL/L (ref 135–145)
WBC # BLD AUTO: 8.9 10E3/UL (ref 4–11)

## 2025-05-28 PROCEDURE — 80051 ELECTROLYTE PANEL: CPT

## 2025-05-28 PROCEDURE — 36415 COLL VENOUS BLD VENIPUNCTURE: CPT

## 2025-05-28 PROCEDURE — 85048 AUTOMATED LEUKOCYTE COUNT: CPT

## 2025-06-13 ENCOUNTER — LAB (OUTPATIENT)
Dept: LAB | Facility: CLINIC | Age: 65
End: 2025-06-13
Payer: MEDICARE

## 2025-06-13 DIAGNOSIS — D45 POLYCYTHEMIA VERA (H): ICD-10-CM

## 2025-06-13 LAB
ALBUMIN SERPL BCG-MCNC: 4.5 G/DL (ref 3.5–5.2)
ALP SERPL-CCNC: 106 U/L (ref 40–150)
ALT SERPL W P-5'-P-CCNC: 26 U/L (ref 0–70)
ANION GAP SERPL CALCULATED.3IONS-SCNC: 9 MMOL/L (ref 7–15)
AST SERPL W P-5'-P-CCNC: 32 U/L (ref 0–45)
BASOPHILS # BLD AUTO: 0.1 10E3/UL (ref 0–0.2)
BASOPHILS NFR BLD AUTO: 1 %
BILIRUB SERPL-MCNC: 0.6 MG/DL
BUN SERPL-MCNC: 13.6 MG/DL (ref 8–23)
CALCIUM SERPL-MCNC: 8.9 MG/DL (ref 8.8–10.4)
CHLORIDE SERPL-SCNC: 106 MMOL/L (ref 98–107)
CREAT SERPL-MCNC: 0.7 MG/DL (ref 0.67–1.17)
EGFRCR SERPLBLD CKD-EPI 2021: >90 ML/MIN/1.73M2
EOSINOPHIL # BLD AUTO: 0.4 10E3/UL (ref 0–0.7)
EOSINOPHIL NFR BLD AUTO: 7 %
ERYTHROCYTE [DISTWIDTH] IN BLOOD BY AUTOMATED COUNT: 20.1 % (ref 10–15)
GLUCOSE SERPL-MCNC: 105 MG/DL (ref 70–99)
HCO3 SERPL-SCNC: 25 MMOL/L (ref 22–29)
HCT VFR BLD AUTO: 46.3 % (ref 40–53)
HGB BLD-MCNC: 13.7 G/DL (ref 13.3–17.7)
IMM GRANULOCYTES # BLD: 0 10E3/UL
IMM GRANULOCYTES NFR BLD: 1 %
LYMPHOCYTES # BLD AUTO: 1.2 10E3/UL (ref 0.8–5.3)
LYMPHOCYTES NFR BLD AUTO: 19 %
MCH RBC QN AUTO: 21.6 PG (ref 26.5–33)
MCHC RBC AUTO-ENTMCNC: 29.6 G/DL (ref 31.5–36.5)
MCV RBC AUTO: 73 FL (ref 78–100)
MONOCYTES # BLD AUTO: 0.5 10E3/UL (ref 0–1.3)
MONOCYTES NFR BLD AUTO: 7 %
NEUTROPHILS # BLD AUTO: 4.2 10E3/UL (ref 1.6–8.3)
NEUTROPHILS NFR BLD AUTO: 65 %
NRBC # BLD AUTO: 0 10E3/UL
NRBC BLD AUTO-RTO: 0 /100
PLATELET # BLD AUTO: 369 10E3/UL (ref 150–450)
POTASSIUM SERPL-SCNC: 4.8 MMOL/L (ref 3.4–5.3)
PROT SERPL-MCNC: 6.6 G/DL (ref 6.4–8.3)
RBC # BLD AUTO: 6.34 10E6/UL (ref 4.4–5.9)
SODIUM SERPL-SCNC: 140 MMOL/L (ref 135–145)
WBC # BLD AUTO: 6.5 10E3/UL (ref 4–11)

## 2025-06-13 PROCEDURE — 82947 ASSAY GLUCOSE BLOOD QUANT: CPT

## 2025-06-13 PROCEDURE — 36415 COLL VENOUS BLD VENIPUNCTURE: CPT

## 2025-06-13 PROCEDURE — 85025 COMPLETE CBC W/AUTO DIFF WBC: CPT

## 2025-06-26 ENCOUNTER — PATIENT OUTREACH (OUTPATIENT)
Dept: ONCOLOGY | Facility: HOSPITAL | Age: 65
End: 2025-06-26
Payer: MEDICARE

## 2025-06-30 ENCOUNTER — PROCEDURE ONLY VISIT (OUTPATIENT)
Dept: ONCOLOGY | Facility: HOSPITAL | Age: 65
End: 2025-06-30
Attending: INTERNAL MEDICINE
Payer: MEDICARE

## 2025-06-30 ENCOUNTER — LAB (OUTPATIENT)
Dept: INFUSION THERAPY | Facility: HOSPITAL | Age: 65
End: 2025-06-30
Attending: INTERNAL MEDICINE
Payer: MEDICARE

## 2025-06-30 VITALS
SYSTOLIC BLOOD PRESSURE: 129 MMHG | OXYGEN SATURATION: 96 % | DIASTOLIC BLOOD PRESSURE: 77 MMHG | WEIGHT: 220 LBS | RESPIRATION RATE: 16 BRPM | TEMPERATURE: 97.7 F | BODY MASS INDEX: 28.25 KG/M2 | HEART RATE: 59 BPM

## 2025-06-30 DIAGNOSIS — D75.1 ERYTHROCYTOSIS: Primary | ICD-10-CM

## 2025-06-30 DIAGNOSIS — D75.1 ERYTHROCYTOSIS: ICD-10-CM

## 2025-06-30 DIAGNOSIS — D45 POLYCYTHEMIA VERA (H): ICD-10-CM

## 2025-06-30 DIAGNOSIS — D75.839 THROMBOCYTOSIS: ICD-10-CM

## 2025-06-30 LAB
ALBUMIN SERPL BCG-MCNC: 4.4 G/DL (ref 3.5–5.2)
ALP SERPL-CCNC: 102 U/L (ref 40–150)
ALT SERPL W P-5'-P-CCNC: 23 U/L (ref 0–70)
ANION GAP SERPL CALCULATED.3IONS-SCNC: 10 MMOL/L (ref 7–15)
AST SERPL W P-5'-P-CCNC: 29 U/L (ref 0–45)
BASOPHILS # BLD AUTO: 0.1 10E3/UL (ref 0–0.2)
BASOPHILS NFR BLD AUTO: 2 %
BILIRUB SERPL-MCNC: 0.8 MG/DL
BUN SERPL-MCNC: 14.7 MG/DL (ref 8–23)
CALCIUM SERPL-MCNC: 9.4 MG/DL (ref 8.8–10.4)
CHLORIDE SERPL-SCNC: 106 MMOL/L (ref 98–107)
CREAT SERPL-MCNC: 0.81 MG/DL (ref 0.67–1.17)
EGFRCR SERPLBLD CKD-EPI 2021: >90 ML/MIN/1.73M2
EOSINOPHIL # BLD AUTO: 0.4 10E3/UL (ref 0–0.7)
EOSINOPHIL NFR BLD AUTO: 6 %
ERYTHROCYTE [DISTWIDTH] IN BLOOD BY AUTOMATED COUNT: 21.2 % (ref 10–15)
GLUCOSE SERPL-MCNC: 86 MG/DL (ref 70–99)
HCO3 SERPL-SCNC: 24 MMOL/L (ref 22–29)
HCT VFR BLD AUTO: 46.3 % (ref 40–53)
HGB BLD-MCNC: 13.6 G/DL (ref 13.3–17.7)
IMM GRANULOCYTES # BLD: 0 10E3/UL
IMM GRANULOCYTES NFR BLD: 1 %
LYMPHOCYTES # BLD AUTO: 1.1 10E3/UL (ref 0.8–5.3)
LYMPHOCYTES NFR BLD AUTO: 14 %
MCH RBC QN AUTO: 20.8 PG (ref 26.5–33)
MCHC RBC AUTO-ENTMCNC: 29.4 G/DL (ref 31.5–36.5)
MCV RBC AUTO: 71 FL (ref 78–100)
MONOCYTES # BLD AUTO: 0.7 10E3/UL (ref 0–1.3)
MONOCYTES NFR BLD AUTO: 9 %
NEUTROPHILS # BLD AUTO: 5.6 10E3/UL (ref 1.6–8.3)
NEUTROPHILS NFR BLD AUTO: 69 %
NRBC # BLD AUTO: 0 10E3/UL
NRBC BLD AUTO-RTO: 0 /100
PLATELET # BLD AUTO: 375 10E3/UL (ref 150–450)
POTASSIUM SERPL-SCNC: 4.2 MMOL/L (ref 3.4–5.3)
PROT SERPL-MCNC: 6.4 G/DL (ref 6.4–8.3)
RBC # BLD AUTO: 6.53 10E6/UL (ref 4.4–5.9)
SODIUM SERPL-SCNC: 140 MMOL/L (ref 135–145)
WBC # BLD AUTO: 8 10E3/UL (ref 4–11)

## 2025-06-30 PROCEDURE — 38222 DX BONE MARROW BX & ASPIR: CPT

## 2025-06-30 PROCEDURE — 88185 FLOWCYTOMETRY/TC ADD-ON: CPT

## 2025-06-30 PROCEDURE — 85025 COMPLETE CBC W/AUTO DIFF WBC: CPT

## 2025-06-30 PROCEDURE — G0452 MOLECULAR PATHOLOGY INTERPR: HCPCS | Mod: 26 | Performed by: PATHOLOGY

## 2025-06-30 PROCEDURE — 81450 HL NEO GSAP 5-50DNA/DNA&RNA: CPT | Performed by: INTERNAL MEDICINE

## 2025-06-30 PROCEDURE — 250N000013 HC RX MED GY IP 250 OP 250 PS 637

## 2025-06-30 PROCEDURE — 88189 FLOWCYTOMETRY/READ 16 & >: CPT | Performed by: STUDENT IN AN ORGANIZED HEALTH CARE EDUCATION/TRAINING PROGRAM

## 2025-06-30 PROCEDURE — 36415 COLL VENOUS BLD VENIPUNCTURE: CPT

## 2025-06-30 PROCEDURE — 88184 FLOWCYTOMETRY/ TC 1 MARKER: CPT | Performed by: STUDENT IN AN ORGANIZED HEALTH CARE EDUCATION/TRAINING PROGRAM

## 2025-06-30 PROCEDURE — 88237 TISSUE CULTURE BONE MARROW: CPT

## 2025-06-30 PROCEDURE — 80053 COMPREHEN METABOLIC PANEL: CPT

## 2025-06-30 RX ORDER — ACETAMINOPHEN 325 MG/1
325 TABLET ORAL ONCE
Status: COMPLETED | OUTPATIENT
Start: 2025-06-30 | End: 2025-06-30

## 2025-06-30 RX ADMIN — ACETAMINOPHEN 325 MG: 325 TABLET ORAL at 09:26

## 2025-06-30 ASSESSMENT — PAIN SCALES - GENERAL
PAINLEVEL_OUTOF10: NO PAIN (0)
PAINLEVEL_OUTOF10: NO PAIN (0)

## 2025-06-30 NOTE — PROGRESS NOTES
BMT ONC Adult Bone Marrow Biopsy Procedure Note  June 30, 2025  BP (!) 153/77 (BP Location: Right arm, Patient Position: Sitting, Cuff Size: Adult Regular)   Pulse 64   Temp 97.7  F (36.5  C) (Oral)   Resp 12   Wt 99.8 kg (220 lb)   SpO2 98%   BMI 28.25 kg/m       DIAGNOSIS: Erythrocytosis     PROCEDURE: Unilateral Bone Marrow Biopsy and Unilateral Aspirate    LOCATION: [facility]    Patient s identification was positively verified by verbal identification and invasive procedure safety checklist was completed. Informed consent was obtained. Following the administration of Tylenol as pre-medication, patient was placed in the prone position and prepped and draped in a sterile manner. Approximately 7 cc of 1% Lidocaine was used over the right posterior iliac spine. Following this a 3 mm incision was made. Trephine bone marrow core(s) was (were) obtained from the [facility]. Bone marrow aspirates were obtained from the [facility]. Aspirates were sent for morphology, immunophenotyping, and cytogenetics. A total of approximately 10 ml of marrow was aspirated. Following this procedure a sterile dressing was applied to the bone marrow biopsy site(s). The patient was placed in the supine position to maintain pressure on the biopsy site. Post-procedure wound care instructions were given.     Complications: NO    Pre-procedural pain: 0 out of 10 on the numeric pain rating scale.     Procedural pain: 0 out of 10 on the numeric pain rating scale.     Post-procedural pain assessment: 0 out of 10 on the numeric pain rating scale.     Interventions: NO    Length of procedure:21 minutes to 45 minutes    Procedure performed by: MATEO Luciano CNP on 6/30/2025 at 10:35 AM

## 2025-06-30 NOTE — PROGRESS NOTES
Derik was at clinic today for a bone marrow biopsy  Consent for procedure was reviewed with Derik and signed. Tylenol premedication was given. Education was reviewed about the biopsy procedure and symptoms post biopsy to monitor. Educational handout was provided and contact information highlighted for any questions or concerns that may arise. Dressing was inspected and was dry and intact with no shadow of blood on the dressing. Derik left clinic via wheel chair and Garry, Simon's wife, provided transportation home. Patient will return to see Dr. Spaulding on 7/23/25 to review the bone marrow biopsy results.    Niyah Avila RN  06/30/25  9:15 AM

## 2025-07-01 NOTE — PROGRESS NOTES
[facility]: Cancer Care                                                                                          Call to Derik to go over bone marrow biopsy education. Derik's bone marrow biopsy is scheduled on 6/30/25 with an arrival time of 8:30 am.    Reviewed that Derik has no dietary restrictions on the morning of the procedure, encouraged Derik to eat a light breakfast. Derik should take medications as prescribed as there is no need to hold anything for this procedure. Derik should also bring  to appointment to drive him home afterwards. Reviewed the general flow for lab and procedure. Confirmed date, time, and location. Derik's questions answered to the best of this writers ability.     Encouraged Derik to contact clinic if any questions or concerns arise prior to procedure. Contact information provided.     Niyah Avila RN  06/26/25  10:18 AM        
Eye

## 2025-07-11 ENCOUNTER — LAB (OUTPATIENT)
Dept: LAB | Facility: CLINIC | Age: 65
End: 2025-07-11
Payer: MEDICARE

## 2025-07-11 DIAGNOSIS — D45 POLYCYTHEMIA VERA (H): ICD-10-CM

## 2025-07-11 LAB
ALBUMIN SERPL BCG-MCNC: 4.4 G/DL (ref 3.5–5.2)
ALP SERPL-CCNC: 101 U/L (ref 40–150)
ALT SERPL W P-5'-P-CCNC: 31 U/L (ref 0–70)
ANION GAP SERPL CALCULATED.3IONS-SCNC: 13 MMOL/L (ref 7–15)
AST SERPL W P-5'-P-CCNC: 29 U/L (ref 0–45)
BASOPHILS # BLD AUTO: 0.1 10E3/UL (ref 0–0.2)
BASOPHILS NFR BLD AUTO: 1 %
BILIRUB SERPL-MCNC: 0.6 MG/DL
BUN SERPL-MCNC: 13.5 MG/DL (ref 8–23)
CALCIUM SERPL-MCNC: 9 MG/DL (ref 8.8–10.4)
CHLORIDE SERPL-SCNC: 105 MMOL/L (ref 98–107)
CREAT SERPL-MCNC: 0.72 MG/DL (ref 0.67–1.17)
EGFRCR SERPLBLD CKD-EPI 2021: >90 ML/MIN/1.73M2
EOSINOPHIL # BLD AUTO: 0.5 10E3/UL (ref 0–0.7)
EOSINOPHIL NFR BLD AUTO: 6 %
ERYTHROCYTE [DISTWIDTH] IN BLOOD BY AUTOMATED COUNT: 22 % (ref 10–15)
GLUCOSE SERPL-MCNC: 132 MG/DL (ref 70–99)
HCO3 SERPL-SCNC: 21 MMOL/L (ref 22–29)
HCT VFR BLD AUTO: 45.4 % (ref 40–53)
HGB BLD-MCNC: 13.7 G/DL (ref 13.3–17.7)
IMM GRANULOCYTES # BLD: 0 10E3/UL
IMM GRANULOCYTES NFR BLD: 1 %
LYMPHOCYTES # BLD AUTO: 1 10E3/UL (ref 0.8–5.3)
LYMPHOCYTES NFR BLD AUTO: 12 %
MCH RBC QN AUTO: 21.5 PG (ref 26.5–33)
MCHC RBC AUTO-ENTMCNC: 30.2 G/DL (ref 31.5–36.5)
MCV RBC AUTO: 71 FL (ref 78–100)
MONOCYTES # BLD AUTO: 0.5 10E3/UL (ref 0–1.3)
MONOCYTES NFR BLD AUTO: 6 %
NEUTROPHILS # BLD AUTO: 6.2 10E3/UL (ref 1.6–8.3)
NEUTROPHILS NFR BLD AUTO: 74 %
NRBC # BLD AUTO: 0 10E3/UL
NRBC BLD AUTO-RTO: 0 /100
PLATELET # BLD AUTO: 390 10E3/UL (ref 150–450)
POTASSIUM SERPL-SCNC: 4.4 MMOL/L (ref 3.4–5.3)
PROT SERPL-MCNC: 6.3 G/DL (ref 6.4–8.3)
RBC # BLD AUTO: 6.38 10E6/UL (ref 4.4–5.9)
SODIUM SERPL-SCNC: 139 MMOL/L (ref 135–145)
WBC # BLD AUTO: 8.4 10E3/UL (ref 4–11)

## 2025-07-11 PROCEDURE — 36415 COLL VENOUS BLD VENIPUNCTURE: CPT

## 2025-07-11 PROCEDURE — 80053 COMPREHEN METABOLIC PANEL: CPT

## 2025-07-11 PROCEDURE — 85004 AUTOMATED DIFF WBC COUNT: CPT

## 2025-07-12 LAB
INTERPRETATION: NORMAL
ISCN: NORMAL
METHODS: NORMAL

## 2025-07-15 LAB — CULTURE HARVEST COMPLETE DATE: NORMAL

## 2025-07-23 ENCOUNTER — ONCOLOGY VISIT (OUTPATIENT)
Dept: ONCOLOGY | Facility: CLINIC | Age: 65
End: 2025-07-23
Attending: INTERNAL MEDICINE
Payer: MEDICARE

## 2025-07-23 VITALS
TEMPERATURE: 98.2 F | WEIGHT: 221.4 LBS | RESPIRATION RATE: 16 BRPM | DIASTOLIC BLOOD PRESSURE: 84 MMHG | OXYGEN SATURATION: 98 % | BODY MASS INDEX: 28.43 KG/M2 | SYSTOLIC BLOOD PRESSURE: 150 MMHG | HEART RATE: 71 BPM

## 2025-07-23 DIAGNOSIS — R53.83 FATIGUE, UNSPECIFIED TYPE: ICD-10-CM

## 2025-07-23 DIAGNOSIS — R71.8 OTHER ABNORMALITY OF RED BLOOD CELLS: ICD-10-CM

## 2025-07-23 DIAGNOSIS — D75.1 ERYTHROCYTOSIS: ICD-10-CM

## 2025-07-23 DIAGNOSIS — D45 POLYCYTHEMIA VERA (H): Primary | ICD-10-CM

## 2025-07-23 PROCEDURE — G0463 HOSPITAL OUTPT CLINIC VISIT: HCPCS | Performed by: INTERNAL MEDICINE

## 2025-07-23 RX ORDER — ROPEGINTERFERON ALFA-2B 500 UG/ML
250 INJECTION SUBCUTANEOUS
Qty: 6 ML | Refills: 0 | Status: SHIPPED | OUTPATIENT
Start: 2025-07-23

## 2025-07-23 ASSESSMENT — PAIN SCALES - GENERAL: PAINLEVEL_OUTOF10: NO PAIN (0)

## 2025-07-23 NOTE — NURSING NOTE
"Oncology Rooming Note    July 23, 2025 10:23 AM   Derik Quiros is a 64 year old male who presents for:    Chief Complaint   Patient presents with    Oncology Clinic Visit     Polycythemia vera     Initial Vitals: BP (!) 150/84 (BP Location: Right arm, Patient Position: Sitting, Cuff Size: Adult Regular)   Pulse 71   Temp 98.2  F (36.8  C) (Oral)   Resp 16   Wt 100.4 kg (221 lb 6.4 oz)   SpO2 98%   BMI 28.43 kg/m   Estimated body mass index is 28.43 kg/m  as calculated from the following:    Height as of 3/7/25: 1.88 m (6' 2\").    Weight as of this encounter: 100.4 kg (221 lb 6.4 oz). Body surface area is 2.29 meters squared.  No Pain (0) Comment: Data Unavailable   No LMP for male patient.  Allergies reviewed: Yes  Medications reviewed: Yes    Medications: Medication refills not needed today.  Pharmacy name entered into [facility]:    [id number] - [place], [place] - [address]  [id number] - [place], [place] - [address] AT [facility] [address]  [facility] - [place], [place] - [address]    PHQ9:  Did this patient require a PHQ9?: No      Clinical concerns: none      Freida Valentin            "

## 2025-07-23 NOTE — PROGRESS NOTES
Hematology Clinic note  In person     Problem List:  -Polycythemia vera-high risk.P vera initially managed with therapeutic phlebotomy and hydroxyurea, transition to ropeginterferon (BESREMi) on 2/21/2025.  Repeat BM Bx 6/30/25  Final Diagnosis   BONE MARROW, RIGHT POSTERIOR ILIAC CREST, ASPIRATION AND CORE BIOPSY:  - HYPERCELLULAR MARROW FOR AGE (80%) WITH MEGAKARYOCYTIC HYPERPLASIA; NUMEROUS ATYPICAL MEGAKARYOCYTES ARE IDENTIFIED  - RETICULIN FIBROSIS (MF 2 OUT OF 3        PERIPHERAL BLOOD:  - ERYTHROCYTOSIS  - NEGATIVE FOR ACUTE LEUKEMIA  - UNREMARKABLE PLATELETS      Electronically signed by Nas Castro MD on 7/14/2025     Interpretation Molecular NGS    The following patient's previously characterized pathogenic mutations were identified in the current bone marrow aspirate.     JAK2 V617F currently at 77%, previously at 68%;   TP53 [id number] currently at 32%, previously at 30%.         -History of PFO with stroke, status post closure Decades ago-open procedure  - Hypertension  - s/p left hip replacement 2011  -Left Hip fracture, status post open reduction internal fixation 5/14/2022  - History of colonic polyps, colonoscopy 3/20/24  -History of nonmelanoma skin cancer removed from cheek and back 2019  -History of liver hemangioma  -Anxiety  -Status post appendectomy  - Status post testicular varicocele surgery.  -Possible aspirin allergy, edema     History of Present Illness: Mr. Quiros is a 64-year-old man here for follow up of polycythemia vera.  He is on Ropeginterferon (BESREMi) 200 mcg subcutaneous every 14 days.  The hydroxyurea has been discontinued.  He has not needed a therapeutic phlebotomy now for many weeks.  He had a bone marrow biopsy and aspirate on 6/30/2025.    He is tolerating the Ropeginterferon well.  No fevers, chills, sweats.  He has a little bit of fatigue, but exercises regularly.  In the afternoon he occasionally falls asleep in the chair.  He says that itching is  improved.  No erythromelalgia.  No bleeding symptoms.    He has had some problems with urinary hesitancy.  He is not scheduled prostate surgery yet, probably in the fall.      PHYSICAL EXAMINATION:  BP (!) 150/84 (BP Location: Right arm, Patient Position: Sitting, Cuff Size: Adult Regular)   Pulse 71   Temp 98.2  F (36.8  C) (Oral)   Resp 16   Wt 100.4 kg (221 lb 6.4 oz)   SpO2 98%   BMI 28.43 kg/m      General appearance:  Patient is  64 year old man in no acute distress.   Able to climb onto exam table without difficulty.  HEENT:  No pallor, icterus, or mucositis.  No thyromegaly.   Lymph nodes:  No cervical, supraclavicular, axillary, or inguinal lymphadenopathy.   Lungs:  Clear to auscultation bilaterally.   Heart:  Regular rate and rhythm; no S3 S4 or murmer.     Abdomen:  Positive bowel sounds, soft and nontender, nondistended.  No hepatomegaly. No splenomegaly appreciated.    Extremities:  No joint swelling or tenderness.  No ankle edema.     Skin: Skin is tanned.  No rash, no petechiae or ecchymoses.    Labs:     Latest Reference Range & Units 06/30/25 08:43 07/11/25 10:59   WBC 4.0 - 11.0 10e3/uL 8.0 8.4   Hemoglobin 13.3 - 17.7 g/dL 13.6 13.7   Hematocrit 40.0 - 53.0 % 46.3 45.4   Platelet Count 150 - 450 10e3/uL 375 390   RBC Count 4.40 - 5.90 10e6/uL 6.53 (H) 6.38 (H)   MCV 78 - 100 fL 71 (L) 71 (L)   MCH 26.5 - 33.0 pg 20.8 (L) 21.5 (L)   MCHC 31.5 - 36.5 g/dL 29.4 (L) 30.2 (L)   RDW 10.0 - 15.0 % 21.2 (H) 22.0 (H)   % Neutrophils % 69 74   % Lymphocytes % 14 12   % Monocytes % 9 6   % Eosinophils % 6 6   % Basophils % 2 1   % Immature Granulocytes % 1 1   NRBC/W <1 /100 0 0   Absolute Neutrophil 1.6 - 8.3 10e3/uL 5.6 6.2   Absolute Lymphocytes 0.8 - 5.3 10e3/uL 1.1 1.0   Absolute Monocytes 0.0 - 1.3 10e3/uL 0.7 0.5   Absolute Eosinophils 0.0 - 0.7 10e3/uL 0.4 0.5   Absolute Basophils 0.0 - 0.2 10e3/uL 0.1 0.1   Absolute Immature Granulocytes <=0.4 10e3/uL 0.0 0.0      Latest Reference Range &  Units 07/11/25 10:59   Sodium 135 - 145 mmol/L 139   Potassium 3.4 - 5.3 mmol/L 4.4   Chloride 98 - 107 mmol/L 105   Carbon Dioxide (CO2) 22 - 29 mmol/L 21 (L)   Urea Nitrogen 8.0 - 23.0 mg/dL 13.5   Creatinine 0.67 - 1.17 mg/dL 0.72   GFR Estimate >60 mL/min/1.73m2 >90   Calcium 8.8 - 10.4 mg/dL 9.0   Anion Gap 7 - 15 mmol/L 13   Albumin 3.5 - 5.2 g/dL 4.4   Protein Total 6.4 - 8.3 g/dL 6.3 (L)   Alkaline Phosphatase 40 - 150 U/L 101   ALT 0 - 70 U/L 31   AST 0 - 45 U/L 29   Bilirubin Total <=1.2 mg/dL 0.6   Glucose 70 - 99 mg/dL 132 (H)   (L): Data is abnormally low  (H): Data is abnormally high    Assessment and Recommendation: -     Polycythemia vera, High risk -The bone marrow biopsy 6/30/25, shows 2/3 fibrosis- about the same, last % 1%,  TP53 mutation, with variant allele frequency (VAF) 32%, was 30%.  The JAK2 V617F VAF  77%, was 68%.  Extended myeloid panel showed no other genetic mutations.  The TP53 mutation places him at increased risk of conversion to acute myelogenous leukemia (AML).  There has not been a dramatic increase in the VA off.  Also no dramatic increase in the fibrosis, so I will hold off adding increased ruxolitinib (Tom V)     He started ropeginterferon on 2/21/25, and is now up to 200 micrograms subcutaneous once every 14 days.  Since his hematocrit is hovering around 45%, and white blood cell count 10 platelets in the high normal range, I will increase the Ropeginterferon to 250 mcg subcu every 14 days.  - Increase ropeginterferon to 250 mcg subcu every 14 days  - Continue clopidogrel 75 mg daily  -Keep follow-up with Dr. Allen in September    # Fatigue-what he is describing is within the realm of normal.  We have probably made him significantly iron deficient with the therapeutic phlebotomy.  Now that he is on medication to control his counts, if he is iron deficient, it would be reasonable to place him on an iron supplement.  - Check ferritin with labs  "tomorrow.    #Hypertension-continue on amlodipine      # Goals of Care-did not discuss today.  He previously indicated that quality of life is more important to him than longevity. He is full code, which is appropriate for his current status. He gave me his signed and notarized health care directive- will be scanned into EPIC.        References:   Luz GASPAR et al, \"Ropeginterferon alpha-TB versus standard therapy for polycythemia vera (PROUD-PV and continuation PROUD-PV) : A randomized, noninferiority, phase 3 trial and its extension study, the Lancet, volume 7, March 2020     The most recent study, which also accounts for multiple mutations and chromosomes was recently published in the NEJM (Dilan and Melinda et al. 2018 [https://cancer.shai.ac.uk/mpn-multistage/]).     https://www.shai.ac.uk/tool/progmod/progmod/      Time: I spent a total of 45 minutes on the day of the visit. Please see the note for further information on patient assessment and treatment.      Inez Spaulding MD  Hematology          "

## 2025-07-23 NOTE — LETTER
7/23/2025      Derik Quiros  [address]  [facility] [address]      Dear Colleague,    Thank you for referring your patient, Derik Quiros, to the [facility]. Please see a copy of my visit note below.    Hematology Clinic note  In person     Problem List:  -Polycythemia vera-high risk.P vera initially managed with therapeutic phlebotomy and hydroxyurea, transition to ropeginterferon (BESREMi) on 2/21/2025.  Repeat BM Bx 6/30/25  Final Diagnosis   BONE MARROW, RIGHT POSTERIOR ILIAC CREST, ASPIRATION AND CORE BIOPSY:  - HYPERCELLULAR MARROW FOR AGE (80%) WITH MEGAKARYOCYTIC HYPERPLASIA; NUMEROUS ATYPICAL MEGAKARYOCYTES ARE IDENTIFIED  - RETICULIN FIBROSIS (MF 2 OUT OF 3        PERIPHERAL BLOOD:  - ERYTHROCYTOSIS  - NEGATIVE FOR ACUTE LEUKEMIA  - UNREMARKABLE PLATELETS      Electronically signed by Nas Castro MD on 7/14/2025     Interpretation Molecular NGS    The following patient's previously characterized pathogenic mutations were identified in the current bone marrow aspirate.     JAK2 V617F currently at 77%, previously at 68%;   TP53 [id number] currently at 32%, previously at 30%.         -History of PFO with stroke, status post closure Decades ago-open procedure  - Hypertension  - s/p left hip replacement 2011  -Left Hip fracture, status post open reduction internal fixation 5/14/2022  - History of colonic polyps, colonoscopy 3/20/24  -History of nonmelanoma skin cancer removed from cheek and back 2019  -History of liver hemangioma  -Anxiety  -Status post appendectomy  - Status post testicular varicocele surgery.  -Possible aspirin allergy, edema     History of Present Illness: Mr. Quiros is a 64-year-old man here for follow up of polycythemia vera.  He is on Ropeginterferon (BESREMi) 200 mcg subcutaneous every 14 days.  The hydroxyurea has been discontinued.  He has not needed a therapeutic phlebotomy now for many weeks.  He had a bone  marrow biopsy and aspirate on 6/30/2025.    He is tolerating the Ropeginterferon well.  No fevers, chills, sweats.  He has a little bit of fatigue, but exercises regularly.  In the afternoon he occasionally falls asleep in the chair.  He says that itching is improved.  No erythromelalgia.  No bleeding symptoms.    He has had some problems with urinary hesitancy.  He is not scheduled prostate surgery yet, probably in the fall.      PHYSICAL EXAMINATION:  BP (!) 150/84 (BP Location: Right arm, Patient Position: Sitting, Cuff Size: Adult Regular)   Pulse 71   Temp 98.2  F (36.8  C) (Oral)   Resp 16   Wt 100.4 kg (221 lb 6.4 oz)   SpO2 98%   BMI 28.43 kg/m      General appearance:  Patient is  64 year old man in no acute distress.   Able to climb onto exam table without difficulty.  HEENT:  No pallor, icterus, or mucositis.  No thyromegaly.   Lymph nodes:  No cervical, supraclavicular, axillary, or inguinal lymphadenopathy.   Lungs:  Clear to auscultation bilaterally.   Heart:  Regular rate and rhythm; no S3 S4 or murmer.     Abdomen:  Positive bowel sounds, soft and nontender, nondistended.  No hepatomegaly. No splenomegaly appreciated.    Extremities:  No joint swelling or tenderness.  No ankle edema.     Skin: Skin is tanned.  No rash, no petechiae or ecchymoses.    Labs:     Latest Reference Range & Units 06/30/25 08:43 07/11/25 10:59   WBC 4.0 - 11.0 10e3/uL 8.0 8.4   Hemoglobin 13.3 - 17.7 g/dL 13.6 13.7   Hematocrit 40.0 - 53.0 % 46.3 45.4   Platelet Count 150 - 450 10e3/uL 375 390   RBC Count 4.40 - 5.90 10e6/uL 6.53 (H) 6.38 (H)   MCV 78 - 100 fL 71 (L) 71 (L)   MCH 26.5 - 33.0 pg 20.8 (L) 21.5 (L)   MCHC 31.5 - 36.5 g/dL 29.4 (L) 30.2 (L)   RDW 10.0 - 15.0 % 21.2 (H) 22.0 (H)   % Neutrophils % 69 74   % Lymphocytes % 14 12   % Monocytes % 9 6   % Eosinophils % 6 6   % Basophils % 2 1   % Immature Granulocytes % 1 1   NRBC/W <1 /100 0 0   Absolute Neutrophil 1.6 - 8.3 10e3/uL 5.6 6.2   Absolute  Lymphocytes 0.8 - 5.3 10e3/uL 1.1 1.0   Absolute Monocytes 0.0 - 1.3 10e3/uL 0.7 0.5   Absolute Eosinophils 0.0 - 0.7 10e3/uL 0.4 0.5   Absolute Basophils 0.0 - 0.2 10e3/uL 0.1 0.1   Absolute Immature Granulocytes <=0.4 10e3/uL 0.0 0.0      Latest Reference Range & Units 07/11/25 10:59   Sodium 135 - 145 mmol/L 139   Potassium 3.4 - 5.3 mmol/L 4.4   Chloride 98 - 107 mmol/L 105   Carbon Dioxide (CO2) 22 - 29 mmol/L 21 (L)   Urea Nitrogen 8.0 - 23.0 mg/dL 13.5   Creatinine 0.67 - 1.17 mg/dL 0.72   GFR Estimate >60 mL/min/1.73m2 >90   Calcium 8.8 - 10.4 mg/dL 9.0   Anion Gap 7 - 15 mmol/L 13   Albumin 3.5 - 5.2 g/dL 4.4   Protein Total 6.4 - 8.3 g/dL 6.3 (L)   Alkaline Phosphatase 40 - 150 U/L 101   ALT 0 - 70 U/L 31   AST 0 - 45 U/L 29   Bilirubin Total <=1.2 mg/dL 0.6   Glucose 70 - 99 mg/dL 132 (H)   (L): Data is abnormally low  (H): Data is abnormally high    Assessment and Recommendation: -     Polycythemia vera, High risk -The bone marrow biopsy 6/30/25, shows 2/3 fibrosis- about the same, last % 1%,  TP53 mutation, with variant allele frequency (VAF) 32%, was 30%.  The JAK2 V617F VAF  77%, was 68%.  Extended myeloid panel showed no other genetic mutations.  The TP53 mutation places him at increased risk of conversion to acute myelogenous leukemia (AML).  There has not been a dramatic increase in the VA off.  Also no dramatic increase in the fibrosis, so I will hold off adding increased ruxolitinib (Tom V)     He started ropeginterferon on 2/21/25, and is now up to 200 micrograms subcutaneous once every 14 days.  Since his hematocrit is hovering around 45%, and white blood cell count 10 platelets in the high normal range, I will increase the Ropeginterferon to 250 mcg subcu every 14 days.  - Increase ropeginterferon to 250 mcg subcu every 14 days  - Continue clopidogrel 75 mg daily  -Keep follow-up with Dr. Allen in September    # Fatigue-what he is describing is within the realm of normal.  We have probably  "made him significantly iron deficient with the therapeutic phlebotomy.  Now that he is on medication to control his counts, if he is iron deficient, it would be reasonable to place him on an iron supplement.  - Check ferritin with labs tomorrow.    #Hypertension-continue on amlodipine      # Goals of Care-did not discuss today.  He previously indicated that quality of life is more important to him than longevity. He is full code, which is appropriate for his current status. He gave me his signed and notarized health care directive- will be scanned into EPIC.        References:   Luz GASPAR et al, \"Ropeginterferon alpha-TB versus standard therapy for polycythemia vera (PROUD-PV and continuation PROUD-PV) : A randomized, noninferiority, phase 3 trial and its extension study, the Lancet, volume 7, March 2020     The most recent study, which also accounts for multiple mutations and chromosomes was recently published in the NEJM (Grnury and Melinda et al. 2018 [https://cancer.shai.ac.uk/mpn-multistage/]).     [email or web address]://[email or web address].ac.uk/tool/progmod/progmod/      Time: I spent a total of 45 minutes on the day of the visit. Please see the note for further information on patient assessment and treatment.      Inez Spaulding MD  Hematology            Again, thank you for allowing me to participate in the care of your patient.        Sincerely,        Inez Spaulding MD    Electronically signed"

## 2025-07-24 ENCOUNTER — LAB (OUTPATIENT)
Dept: LAB | Facility: CLINIC | Age: 65
End: 2025-07-24
Payer: MEDICARE

## 2025-07-24 DIAGNOSIS — D75.1 ERYTHROCYTOSIS: ICD-10-CM

## 2025-07-24 DIAGNOSIS — R53.83 FATIGUE, UNSPECIFIED TYPE: ICD-10-CM

## 2025-07-24 DIAGNOSIS — R71.8 OTHER ABNORMALITY OF RED BLOOD CELLS: ICD-10-CM

## 2025-07-24 LAB
ALBUMIN SERPL BCG-MCNC: 4.5 G/DL (ref 3.5–5.2)
ALP SERPL-CCNC: 100 U/L (ref 40–150)
ALT SERPL W P-5'-P-CCNC: 29 U/L (ref 0–70)
ANION GAP SERPL CALCULATED.3IONS-SCNC: 10 MMOL/L (ref 7–15)
AST SERPL W P-5'-P-CCNC: 29 U/L (ref 0–45)
BASOPHILS # BLD AUTO: 0.1 10E3/UL (ref 0–0.2)
BASOPHILS NFR BLD AUTO: 2 %
BILIRUB SERPL-MCNC: 0.5 MG/DL
BUN SERPL-MCNC: 15.5 MG/DL (ref 8–23)
CALCIUM SERPL-MCNC: 9.1 MG/DL (ref 8.8–10.4)
CHLORIDE SERPL-SCNC: 107 MMOL/L (ref 98–107)
CREAT SERPL-MCNC: 0.75 MG/DL (ref 0.67–1.17)
EGFRCR SERPLBLD CKD-EPI 2021: >90 ML/MIN/1.73M2
EOSINOPHIL # BLD AUTO: 0.4 10E3/UL (ref 0–0.7)
EOSINOPHIL NFR BLD AUTO: 6 %
ERYTHROCYTE [DISTWIDTH] IN BLOOD BY AUTOMATED COUNT: 22.2 % (ref 10–15)
FERRITIN SERPL-MCNC: 35 NG/ML (ref 31–409)
GLUCOSE SERPL-MCNC: 99 MG/DL (ref 70–99)
HCO3 SERPL-SCNC: 22 MMOL/L (ref 22–29)
HCT VFR BLD AUTO: 45.8 % (ref 40–53)
HGB BLD-MCNC: 14.1 G/DL (ref 13.3–17.7)
IMM GRANULOCYTES # BLD: 0 10E3/UL
IMM GRANULOCYTES NFR BLD: 0 %
IRON BINDING CAPACITY (ROCHE): 331 UG/DL (ref 240–430)
IRON SATN MFR SERPL: 9 % (ref 15–46)
IRON SERPL-MCNC: 29 UG/DL (ref 61–157)
LYMPHOCYTES # BLD AUTO: 1.2 10E3/UL (ref 0.8–5.3)
LYMPHOCYTES NFR BLD AUTO: 16 %
MCH RBC QN AUTO: 21.2 PG (ref 26.5–33)
MCHC RBC AUTO-ENTMCNC: 30.8 G/DL (ref 31.5–36.5)
MCV RBC AUTO: 69 FL (ref 78–100)
MONOCYTES # BLD AUTO: 0.4 10E3/UL (ref 0–1.3)
MONOCYTES NFR BLD AUTO: 6 %
NEUTROPHILS # BLD AUTO: 5 10E3/UL (ref 1.6–8.3)
NEUTROPHILS NFR BLD AUTO: 70 %
NRBC # BLD AUTO: 0 10E3/UL
NRBC BLD AUTO-RTO: 0 /100
PLATELET # BLD AUTO: 342 10E3/UL (ref 150–450)
POTASSIUM SERPL-SCNC: 4.3 MMOL/L (ref 3.4–5.3)
PROT SERPL-MCNC: 6.5 G/DL (ref 6.4–8.3)
RBC # BLD AUTO: 6.64 10E6/UL (ref 4.4–5.9)
SODIUM SERPL-SCNC: 139 MMOL/L (ref 135–145)
WBC # BLD AUTO: 7.2 10E3/UL (ref 4–11)

## 2025-07-24 PROCEDURE — 83550 IRON BINDING TEST: CPT

## 2025-07-24 PROCEDURE — 82728 ASSAY OF FERRITIN: CPT

## 2025-07-24 PROCEDURE — 36415 COLL VENOUS BLD VENIPUNCTURE: CPT

## 2025-07-24 PROCEDURE — 85025 COMPLETE CBC W/AUTO DIFF WBC: CPT

## 2025-07-24 PROCEDURE — 82310 ASSAY OF CALCIUM: CPT

## 2025-08-06 ENCOUNTER — LAB (OUTPATIENT)
Dept: LAB | Facility: CLINIC | Age: 65
End: 2025-08-06
Payer: MEDICARE

## 2025-08-06 DIAGNOSIS — D45 POLYCYTHEMIA VERA (H): ICD-10-CM

## 2025-08-06 LAB
ALBUMIN SERPL BCG-MCNC: 4.4 G/DL (ref 3.5–5.2)
ALP SERPL-CCNC: 97 U/L (ref 40–150)
ALT SERPL W P-5'-P-CCNC: 26 U/L (ref 0–70)
ANION GAP SERPL CALCULATED.3IONS-SCNC: 11 MMOL/L (ref 7–15)
AST SERPL W P-5'-P-CCNC: 32 U/L (ref 0–45)
BASOPHILS # BLD AUTO: 0.1 10E3/UL (ref 0–0.2)
BASOPHILS NFR BLD AUTO: 2 %
BILIRUB SERPL-MCNC: 0.5 MG/DL
BUN SERPL-MCNC: 17.4 MG/DL (ref 8–23)
CALCIUM SERPL-MCNC: 9.2 MG/DL (ref 8.8–10.4)
CHLORIDE SERPL-SCNC: 107 MMOL/L (ref 98–107)
CREAT SERPL-MCNC: 0.74 MG/DL (ref 0.67–1.17)
EGFRCR SERPLBLD CKD-EPI 2021: >90 ML/MIN/1.73M2
EOSINOPHIL # BLD AUTO: 0.4 10E3/UL (ref 0–0.7)
EOSINOPHIL NFR BLD AUTO: 5 %
ERYTHROCYTE [DISTWIDTH] IN BLOOD BY AUTOMATED COUNT: 22.4 % (ref 10–15)
GLUCOSE SERPL-MCNC: 108 MG/DL (ref 70–99)
HCO3 SERPL-SCNC: 21 MMOL/L (ref 22–29)
HCT VFR BLD AUTO: 45.7 % (ref 40–53)
HGB BLD-MCNC: 14.1 G/DL (ref 13.3–17.7)
IMM GRANULOCYTES # BLD: 0 10E3/UL
IMM GRANULOCYTES NFR BLD: 0 %
LYMPHOCYTES # BLD AUTO: 1 10E3/UL (ref 0.8–5.3)
LYMPHOCYTES NFR BLD AUTO: 13 %
MCH RBC QN AUTO: 21.2 PG (ref 26.5–33)
MCHC RBC AUTO-ENTMCNC: 30.9 G/DL (ref 31.5–36.5)
MCV RBC AUTO: 69 FL (ref 78–100)
MONOCYTES # BLD AUTO: 0.5 10E3/UL (ref 0–1.3)
MONOCYTES NFR BLD AUTO: 7 %
NEUTROPHILS # BLD AUTO: 5.5 10E3/UL (ref 1.6–8.3)
NEUTROPHILS NFR BLD AUTO: 73 %
NRBC # BLD AUTO: 0 10E3/UL
NRBC BLD AUTO-RTO: 0 /100
PLATELET # BLD AUTO: 337 10E3/UL (ref 150–450)
POTASSIUM SERPL-SCNC: 4.3 MMOL/L (ref 3.4–5.3)
PROT SERPL-MCNC: 6.3 G/DL (ref 6.4–8.3)
RBC # BLD AUTO: 6.64 10E6/UL (ref 4.4–5.9)
SODIUM SERPL-SCNC: 139 MMOL/L (ref 135–145)
WBC # BLD AUTO: 7.5 10E3/UL (ref 4–11)

## 2025-08-06 PROCEDURE — 36415 COLL VENOUS BLD VENIPUNCTURE: CPT

## 2025-08-06 PROCEDURE — 85018 HEMOGLOBIN: CPT

## 2025-08-06 PROCEDURE — 84075 ASSAY ALKALINE PHOSPHATASE: CPT

## 2025-08-20 ENCOUNTER — LAB (OUTPATIENT)
Dept: LAB | Facility: CLINIC | Age: 65
End: 2025-08-20
Payer: MEDICARE

## 2025-08-20 DIAGNOSIS — D45 POLYCYTHEMIA VERA (H): ICD-10-CM

## 2025-08-20 LAB
ALBUMIN SERPL BCG-MCNC: 4.3 G/DL (ref 3.5–5.2)
ALP SERPL-CCNC: 101 U/L (ref 40–150)
ALT SERPL W P-5'-P-CCNC: 24 U/L (ref 0–70)
ANION GAP SERPL CALCULATED.3IONS-SCNC: 8 MMOL/L (ref 7–15)
AST SERPL W P-5'-P-CCNC: 26 U/L (ref 0–45)
BASOPHILS # BLD AUTO: 0.1 10E3/UL (ref 0–0.2)
BASOPHILS NFR BLD AUTO: 1.4 %
BILIRUB SERPL-MCNC: 0.4 MG/DL
BUN SERPL-MCNC: 13 MG/DL (ref 8–23)
CALCIUM SERPL-MCNC: 9.4 MG/DL (ref 8.8–10.4)
CHLORIDE SERPL-SCNC: 108 MMOL/L (ref 98–107)
CREAT SERPL-MCNC: 0.72 MG/DL (ref 0.67–1.17)
EGFRCR SERPLBLD CKD-EPI 2021: >90 ML/MIN/1.73M2
EOSINOPHIL # BLD AUTO: 0.41 10E3/UL (ref 0–0.7)
EOSINOPHIL NFR BLD AUTO: 5.9 %
ERYTHROCYTE [DISTWIDTH] IN BLOOD BY AUTOMATED COUNT: 22.5 % (ref 10–15)
GLUCOSE SERPL-MCNC: 111 MG/DL (ref 70–99)
HCO3 SERPL-SCNC: 24 MMOL/L (ref 22–29)
HCT VFR BLD AUTO: 46.8 % (ref 40–53)
HGB BLD-MCNC: 14.2 G/DL (ref 13.3–17.7)
IMM GRANULOCYTES # BLD: 0.03 10E3/UL
IMM GRANULOCYTES NFR BLD: 0.4 %
LYMPHOCYTES # BLD AUTO: 1.11 10E3/UL (ref 0.8–5.3)
LYMPHOCYTES NFR BLD AUTO: 15.9 %
MCH RBC QN AUTO: 21.1 PG (ref 26.5–33)
MCHC RBC AUTO-ENTMCNC: 30.3 G/DL (ref 31.5–36.5)
MCV RBC AUTO: 69.5 FL (ref 78–100)
MONOCYTES # BLD AUTO: 0.64 10E3/UL (ref 0–1.3)
MONOCYTES NFR BLD AUTO: 9.2 %
NEUTROPHILS # BLD AUTO: 4.68 10E3/UL (ref 1.6–8.3)
NEUTROPHILS NFR BLD AUTO: 67.2 %
NRBC # BLD AUTO: <0.03 10E3/UL
NRBC BLD AUTO-RTO: 0 /100
PLATELET # BLD AUTO: 355 10E3/UL (ref 150–450)
POTASSIUM SERPL-SCNC: 4.3 MMOL/L (ref 3.4–5.3)
PROT SERPL-MCNC: 6.3 G/DL (ref 6.4–8.3)
RBC # BLD AUTO: 6.73 10E6/UL (ref 4.4–5.9)
SODIUM SERPL-SCNC: 140 MMOL/L (ref 135–145)
WBC # BLD AUTO: 6.97 10E3/UL (ref 4–11)

## 2025-08-20 PROCEDURE — 36415 COLL VENOUS BLD VENIPUNCTURE: CPT

## 2025-08-20 PROCEDURE — 80053 COMPREHEN METABOLIC PANEL: CPT

## 2025-08-20 PROCEDURE — 85004 AUTOMATED DIFF WBC COUNT: CPT

## 2025-09-03 ENCOUNTER — PATIENT OUTREACH (OUTPATIENT)
Dept: ONCOLOGY | Facility: CLINIC | Age: 65
End: 2025-09-03

## 2025-09-03 ENCOUNTER — LAB (OUTPATIENT)
Dept: LAB | Facility: CLINIC | Age: 65
End: 2025-09-03
Payer: MEDICARE

## 2025-09-03 DIAGNOSIS — D75.1 ERYTHROCYTOSIS: ICD-10-CM

## 2025-09-03 DIAGNOSIS — D45 POLYCYTHEMIA VERA (H): ICD-10-CM

## 2025-09-03 LAB
ALBUMIN SERPL BCG-MCNC: 4.4 G/DL (ref 3.5–5.2)
ALP SERPL-CCNC: 100 U/L (ref 40–150)
ALT SERPL W P-5'-P-CCNC: 32 U/L (ref 0–70)
ANION GAP SERPL CALCULATED.3IONS-SCNC: 8 MMOL/L (ref 7–15)
AST SERPL W P-5'-P-CCNC: 30 U/L (ref 0–45)
BASOPHILS # BLD AUTO: 0.09 10E3/UL (ref 0–0.2)
BASOPHILS NFR BLD AUTO: 1.4 %
BILIRUB SERPL-MCNC: 0.5 MG/DL
BUN SERPL-MCNC: 13 MG/DL (ref 8–23)
CALCIUM SERPL-MCNC: 9.2 MG/DL (ref 8.8–10.4)
CHLORIDE SERPL-SCNC: 106 MMOL/L (ref 98–107)
CREAT SERPL-MCNC: 0.7 MG/DL (ref 0.67–1.17)
EGFRCR SERPLBLD CKD-EPI 2021: >90 ML/MIN/1.73M2
EOSINOPHIL # BLD AUTO: 0.39 10E3/UL (ref 0–0.7)
EOSINOPHIL NFR BLD AUTO: 6.2 %
ERYTHROCYTE [DISTWIDTH] IN BLOOD BY AUTOMATED COUNT: 22.3 % (ref 10–15)
GLUCOSE SERPL-MCNC: 95 MG/DL (ref 70–99)
HCO3 SERPL-SCNC: 24 MMOL/L (ref 22–29)
HCT VFR BLD AUTO: 48.2 % (ref 40–53)
HGB BLD-MCNC: 14.8 G/DL (ref 13.3–17.7)
IMM GRANULOCYTES # BLD: <0.03 10E3/UL
IMM GRANULOCYTES NFR BLD: 0.3 %
LYMPHOCYTES # BLD AUTO: 1.09 10E3/UL (ref 0.8–5.3)
LYMPHOCYTES NFR BLD AUTO: 17.4 %
MCH RBC QN AUTO: 21.4 PG (ref 26.5–33)
MCHC RBC AUTO-ENTMCNC: 30.7 G/DL (ref 31.5–36.5)
MCV RBC AUTO: 69.6 FL (ref 78–100)
MONOCYTES # BLD AUTO: 0.43 10E3/UL (ref 0–1.3)
MONOCYTES NFR BLD AUTO: 6.9 %
NEUTROPHILS # BLD AUTO: 4.24 10E3/UL (ref 1.6–8.3)
NEUTROPHILS NFR BLD AUTO: 67.8 %
NRBC # BLD AUTO: <0.03 10E3/UL
NRBC BLD AUTO-RTO: 0 /100
PLATELET # BLD AUTO: 295 10E3/UL (ref 150–450)
POTASSIUM SERPL-SCNC: 4.5 MMOL/L (ref 3.4–5.3)
PROT SERPL-MCNC: 6.5 G/DL (ref 6.4–8.3)
RBC # BLD AUTO: 6.93 10E6/UL (ref 4.4–5.9)
SODIUM SERPL-SCNC: 138 MMOL/L (ref 135–145)
WBC # BLD AUTO: 6.26 10E3/UL (ref 4–11)

## 2025-09-03 PROCEDURE — 85048 AUTOMATED LEUKOCYTE COUNT: CPT

## 2025-09-03 PROCEDURE — 36415 COLL VENOUS BLD VENIPUNCTURE: CPT

## 2025-09-03 PROCEDURE — 82040 ASSAY OF SERUM ALBUMIN: CPT

## 2025-09-03 RX ORDER — ROPEGINTERFERON ALFA-2B 500 UG/ML
300 INJECTION SUBCUTANEOUS
Qty: 6 ML | Refills: 0 | Status: SHIPPED | OUTPATIENT
Start: 2025-09-03

## (undated) DEVICE — ALCOHOL ISOPROPYL 4 OZ 70% IA7004

## (undated) DEVICE — IMP SCR SYN 5.0X40MM VA LOCK ST T25 STARDRIVE 02.231.240: Type: IMPLANTABLE DEVICE | Site: LEG | Status: NON-FUNCTIONAL

## (undated) DEVICE — DRAPE CONVERTORS U-DRAPE 60X72" 8476

## (undated) DEVICE — MARKER SURG SKIN STRL 77734

## (undated) DEVICE — BRUSH FEMORAL CANAL NARROW 110033

## (undated) DEVICE — BLADE SAW SAGITTAL STRK WIDE 25.4X85X1.2MM 2108-151-000

## (undated) DEVICE — SOL NACL 0.9% INJ 1000ML BAG 2B1324X

## (undated) DEVICE — Device

## (undated) DEVICE — DRAPE IOBAN INCISE 23X17" 6650EZ

## (undated) DEVICE — SUTURE VICRYL+ 2-0 CT-2 27" UND VCP269H

## (undated) DEVICE — GLOVE UNDER INDICATOR PI SZ 7.0 LF 41670

## (undated) DEVICE — BONE CLEANING TIP INTERPULSE FEMORAL CANAL 0210-008-000

## (undated) DEVICE — PLATE GROUNDING ADULT W/CORD 9165L

## (undated) DEVICE — SUTURE VICRYL+ 2-0 27IN CT-1 UND VCP259H

## (undated) DEVICE — SU VICRYL+ 0 27IN CT-2 UND VCP270H

## (undated) DEVICE — SU ETHIBOND 2 V-37 4X30" MX69G

## (undated) DEVICE — GOWN IMPERVIOUS BREATHABLE SMART LG 89015

## (undated) DEVICE — A3 SUPPLIES- SEE NURSING INFO PAGE

## (undated) DEVICE — CUSTOM PACK TOTAL HIP SOP5BTHHEA

## (undated) DEVICE — DRESSING MEPILEX BORDER POST-OP 4X8

## (undated) DEVICE — DRESSING MEPILEX BORDER POST-OP 4X12

## (undated) DEVICE — SUTURE VICRYL+ 0 27IN CT-1 UND VCP260H

## (undated) DEVICE — IMP SCR SYN CORTEX 4.5X36MM SELF TAP SS 214.836: Type: IMPLANTABLE DEVICE | Site: LEG | Status: NON-FUNCTIONAL

## (undated) DEVICE — BONE CLEANING TIP INTERPULSE  0210-010-000

## (undated) DEVICE — DRSG KERLIX FLUFFS X5

## (undated) DEVICE — SUTURE VICRYL+ 1-0 36IN CT-1 UND VCP947H

## (undated) DEVICE — GLOVE BIOGEL PI ORTHOPRO SZ 7.5 47675

## (undated) DEVICE — SOL WATER IRRIG 1000ML BOTTLE 2F7114

## (undated) DEVICE — PAD HIP POSITIONING MCGUIRE 707-CPM

## (undated) DEVICE — RETRIVER SUTURE LASSO HOFFEE BLUE 710000

## (undated) DEVICE — GLOVE BIOGEL PI ULTRATOUCH G SZ 7.0 42170

## (undated) DEVICE — PACKING VAG 2 X 15 XRAY DETECT 32-1359

## (undated) DEVICE — GOWN IMPERVIOUS BREATHABLE SMART XLG 89045

## (undated) DEVICE — PREP CHLORAPREP 26ML TINTED HI-LITE ORANGE 930815

## (undated) DEVICE — SOLUTION IRRIG 2B7127 .9NS 3000ML BAG

## (undated) DEVICE — GLOVE BIOGEL PI INDICATOR 8.0 LF 41680

## (undated) DEVICE — SU FIBERWIRE 2 38" T-8 NDL  AR-7206

## (undated) DEVICE — SUCTION MANIFOLD NEPTUNE 2 SYS 4 PORT 0702-020-000

## (undated) DEVICE — GUIDE WIRE 2.5X200MM 310.243

## (undated) DEVICE — DRILL BIT 3.2X145MM  310.31

## (undated) DEVICE — HOLDER LIMB VELCRO OR 0814-1533

## (undated) DEVICE — SUCTION IRR SYSTEM W/O TIP INTERPULSE HANDPIECE 0210-100-000

## (undated) DEVICE — SCREW STARDRIVE W/T15 3.5X50MM: Type: IMPLANTABLE DEVICE | Site: LEG | Status: NON-FUNCTIONAL

## (undated) DEVICE — STPL SKIN 35W 6.9MM  PXW35

## (undated) DEVICE — DRILL BIT SYN 4.3X180MM  310.431

## (undated) DEVICE — DRSG AQUACEL AG HYDROFIBER  3.5X10" 422605

## (undated) DEVICE — SPONGE LAP 18X18" X8435

## (undated) RX ORDER — LIDOCAINE HYDROCHLORIDE 10 MG/ML
INJECTION, SOLUTION EPIDURAL; INFILTRATION; INTRACAUDAL; PERINEURAL
Status: DISPENSED
Start: 2022-05-14

## (undated) RX ORDER — ONDANSETRON 2 MG/ML
INJECTION INTRAMUSCULAR; INTRAVENOUS
Status: DISPENSED
Start: 2022-05-14

## (undated) RX ORDER — PROPOFOL 10 MG/ML
INJECTION, EMULSION INTRAVENOUS
Status: DISPENSED
Start: 2022-05-14

## (undated) RX ORDER — DEXAMETHASONE SODIUM PHOSPHATE 10 MG/ML
INJECTION, SOLUTION INTRAMUSCULAR; INTRAVENOUS
Status: DISPENSED
Start: 2022-05-14

## (undated) RX ORDER — FENTANYL CITRATE 50 UG/ML
INJECTION, SOLUTION INTRAMUSCULAR; INTRAVENOUS
Status: DISPENSED
Start: 2022-05-14